# Patient Record
Sex: FEMALE | Race: WHITE | NOT HISPANIC OR LATINO | Employment: OTHER | URBAN - METROPOLITAN AREA
[De-identification: names, ages, dates, MRNs, and addresses within clinical notes are randomized per-mention and may not be internally consistent; named-entity substitution may affect disease eponyms.]

---

## 2019-04-23 ENCOUNTER — TRANSCRIBE ORDERS (OUTPATIENT)
Dept: ADMINISTRATIVE | Facility: HOSPITAL | Age: 69
End: 2019-04-23

## 2019-04-23 DIAGNOSIS — E04.1 THYROID NODULE: ICD-10-CM

## 2019-04-23 DIAGNOSIS — R94.6 NONSPECIFIC ABNORMAL RESULTS OF THYROID FUNCTION STUDY: Primary | ICD-10-CM

## 2019-04-25 ENCOUNTER — HOSPITAL ENCOUNTER (OUTPATIENT)
Dept: ULTRASOUND IMAGING | Facility: HOSPITAL | Age: 69
Discharge: HOME | End: 2019-04-25
Payer: COMMERCIAL

## 2019-04-25 DIAGNOSIS — R94.6 NONSPECIFIC ABNORMAL RESULTS OF THYROID FUNCTION STUDY: ICD-10-CM

## 2019-04-25 DIAGNOSIS — E04.1 THYROID NODULE: ICD-10-CM

## 2019-04-25 PROCEDURE — 76536 US EXAM OF HEAD AND NECK: CPT

## 2019-04-29 ENCOUNTER — TRANSCRIBE ORDERS (OUTPATIENT)
Dept: ADMINISTRATIVE | Facility: HOSPITAL | Age: 69
End: 2019-04-29

## 2019-04-29 DIAGNOSIS — E04.1 THYROID CYST: Primary | ICD-10-CM

## 2019-05-02 ENCOUNTER — OFFICE VISIT (OUTPATIENT)
Dept: FAMILY MEDICINE CLINIC | Facility: CLINIC | Age: 69
End: 2019-05-02
Payer: COMMERCIAL

## 2019-05-02 VITALS
BODY MASS INDEX: 25.3 KG/M2 | DIASTOLIC BLOOD PRESSURE: 70 MMHG | WEIGHT: 142.8 LBS | SYSTOLIC BLOOD PRESSURE: 118 MMHG | HEART RATE: 63 BPM | TEMPERATURE: 98.7 F | RESPIRATION RATE: 18 BRPM | OXYGEN SATURATION: 98 % | HEIGHT: 63 IN

## 2019-05-02 DIAGNOSIS — E04.2 MULTIPLE THYROID NODULES: ICD-10-CM

## 2019-05-02 DIAGNOSIS — F41.9 ANXIETY: Primary | ICD-10-CM

## 2019-05-02 DIAGNOSIS — R19.8 ALTERNATING CONSTIPATION AND DIARRHEA: ICD-10-CM

## 2019-05-02 DIAGNOSIS — R79.89 ABNORMAL TSH: ICD-10-CM

## 2019-05-02 PROCEDURE — 1101F PT FALLS ASSESS-DOCD LE1/YR: CPT | Performed by: FAMILY MEDICINE

## 2019-05-02 PROCEDURE — 99204 OFFICE O/P NEW MOD 45 MIN: CPT | Performed by: FAMILY MEDICINE

## 2019-05-02 RX ORDER — OMEPRAZOLE 20 MG/1
20 CAPSULE, DELAYED RELEASE ORAL DAILY
Refills: 0 | COMMUNITY
Start: 2019-04-16 | End: 2019-05-06

## 2019-05-02 RX ORDER — LORAZEPAM 0.5 MG/1
TABLET ORAL EVERY 8 HOURS PRN
COMMUNITY
End: 2019-06-24 | Stop reason: SDUPTHER

## 2019-05-02 RX ORDER — ASPIRIN 81 MG/1
81 TABLET ORAL DAILY
COMMUNITY
End: 2019-06-24

## 2019-05-02 RX ORDER — ROSUVASTATIN CALCIUM 5 MG/1
5 TABLET, COATED ORAL DAILY
COMMUNITY
Start: 2019-04-23 | End: 2019-10-24

## 2019-05-02 RX ORDER — ESCITALOPRAM OXALATE 5 MG/1
5 TABLET ORAL DAILY
COMMUNITY
Start: 2019-04-30 | End: 2019-05-23

## 2019-05-02 RX ORDER — OMEPRAZOLE 40 MG/1
40 CAPSULE, DELAYED RELEASE ORAL DAILY
Refills: 0 | COMMUNITY
Start: 2019-03-25 | End: 2019-05-02 | Stop reason: ALTCHOICE

## 2019-05-02 RX ORDER — MELATONIN
2000 DAILY
COMMUNITY

## 2019-05-06 ENCOUNTER — TELEPHONE (OUTPATIENT)
Dept: FAMILY MEDICINE CLINIC | Facility: CLINIC | Age: 69
End: 2019-05-06

## 2019-05-06 ENCOUNTER — OFFICE VISIT (OUTPATIENT)
Dept: FAMILY MEDICINE CLINIC | Facility: CLINIC | Age: 69
End: 2019-05-06
Payer: COMMERCIAL

## 2019-05-06 VITALS
OXYGEN SATURATION: 98 % | BODY MASS INDEX: 25.52 KG/M2 | TEMPERATURE: 97.2 F | RESPIRATION RATE: 16 BRPM | SYSTOLIC BLOOD PRESSURE: 138 MMHG | HEIGHT: 63 IN | DIASTOLIC BLOOD PRESSURE: 72 MMHG | WEIGHT: 144 LBS | HEART RATE: 58 BPM

## 2019-05-06 DIAGNOSIS — D69.6 THROMBOCYTOPENIA (HCC): ICD-10-CM

## 2019-05-06 DIAGNOSIS — F41.9 ANXIETY: ICD-10-CM

## 2019-05-06 DIAGNOSIS — E78.2 MIXED HYPERLIPIDEMIA: ICD-10-CM

## 2019-05-06 DIAGNOSIS — E04.2 MULTIPLE THYROID NODULES: ICD-10-CM

## 2019-05-06 DIAGNOSIS — E05.90 SUBCLINICAL HYPERTHYROIDISM: Primary | ICD-10-CM

## 2019-05-06 DIAGNOSIS — R19.8 ALTERNATING CONSTIPATION AND DIARRHEA: ICD-10-CM

## 2019-05-06 PROCEDURE — 99214 OFFICE O/P EST MOD 30 MIN: CPT | Performed by: FAMILY MEDICINE

## 2019-05-16 ENCOUNTER — TELEPHONE (OUTPATIENT)
Dept: FAMILY MEDICINE CLINIC | Facility: CLINIC | Age: 69
End: 2019-05-16

## 2019-05-20 ENCOUNTER — HOSPITAL ENCOUNTER (OUTPATIENT)
Dept: NUCLEAR MEDICINE | Facility: HOSPITAL | Age: 69
Discharge: HOME | End: 2019-05-20
Payer: COMMERCIAL

## 2019-05-20 DIAGNOSIS — E04.1 THYROID CYST: ICD-10-CM

## 2019-05-20 PROCEDURE — 78014 THYROID IMAGING W/BLOOD FLOW: CPT

## 2019-05-20 PROCEDURE — A9516 IODINE I-123 SOD IODIDE MIC: HCPCS

## 2019-05-21 ENCOUNTER — HOSPITAL ENCOUNTER (OUTPATIENT)
Dept: NUCLEAR MEDICINE | Facility: HOSPITAL | Age: 69
Discharge: HOME | End: 2019-05-21
Payer: COMMERCIAL

## 2019-05-23 ENCOUNTER — TELEPHONE (OUTPATIENT)
Dept: FAMILY MEDICINE CLINIC | Facility: CLINIC | Age: 69
End: 2019-05-23

## 2019-05-23 ENCOUNTER — OFFICE VISIT (OUTPATIENT)
Dept: FAMILY MEDICINE CLINIC | Facility: CLINIC | Age: 69
End: 2019-05-23
Payer: COMMERCIAL

## 2019-05-23 VITALS
OXYGEN SATURATION: 99 % | TEMPERATURE: 98 F | HEART RATE: 78 BPM | RESPIRATION RATE: 12 BRPM | HEIGHT: 63 IN | BODY MASS INDEX: 24.45 KG/M2 | SYSTOLIC BLOOD PRESSURE: 126 MMHG | WEIGHT: 138 LBS | DIASTOLIC BLOOD PRESSURE: 68 MMHG

## 2019-05-23 DIAGNOSIS — Z12.31 ENCOUNTER FOR SCREENING MAMMOGRAM FOR BREAST CANCER: ICD-10-CM

## 2019-05-23 DIAGNOSIS — F41.9 ANXIETY: ICD-10-CM

## 2019-05-23 DIAGNOSIS — R63.4 WEIGHT LOSS, NON-INTENTIONAL: ICD-10-CM

## 2019-05-23 DIAGNOSIS — E04.2 MULTIPLE THYROID NODULES: ICD-10-CM

## 2019-05-23 DIAGNOSIS — E05.90 SUBCLINICAL HYPERTHYROIDISM: Primary | ICD-10-CM

## 2019-05-23 DIAGNOSIS — R19.4 CHANGE IN BOWEL HABITS: ICD-10-CM

## 2019-05-23 PROCEDURE — 99215 OFFICE O/P EST HI 40 MIN: CPT | Performed by: FAMILY MEDICINE

## 2019-05-23 RX ORDER — CETIRIZINE HYDROCHLORIDE 10 MG/1
10 TABLET ORAL DAILY
COMMUNITY

## 2019-05-23 RX ORDER — ESCITALOPRAM OXALATE 10 MG/1
10 TABLET ORAL DAILY
Qty: 90 TABLET | Refills: 3 | Status: SHIPPED | OUTPATIENT
Start: 2019-05-23 | End: 2019-05-24

## 2019-05-24 ENCOUNTER — TELEPHONE (OUTPATIENT)
Dept: FAMILY MEDICINE CLINIC | Facility: CLINIC | Age: 69
End: 2019-05-24

## 2019-05-24 DIAGNOSIS — F41.9 ANXIETY: Primary | ICD-10-CM

## 2019-05-24 RX ORDER — ESCITALOPRAM OXALATE 5 MG/1
5 TABLET ORAL DAILY
Qty: 90 TABLET | Refills: 3 | Status: SHIPPED | OUTPATIENT
Start: 2019-05-24 | End: 2019-06-24

## 2019-05-29 ENCOUNTER — TELEPHONE (OUTPATIENT)
Dept: ENDOCRINOLOGY | Facility: HOSPITAL | Age: 69
End: 2019-05-29

## 2019-06-03 ENCOUNTER — APPOINTMENT (OUTPATIENT)
Dept: LAB | Facility: CLINIC | Age: 69
End: 2019-06-03
Payer: COMMERCIAL

## 2019-06-03 ENCOUNTER — OFFICE VISIT (OUTPATIENT)
Dept: ENDOCRINOLOGY | Facility: CLINIC | Age: 69
End: 2019-06-03
Payer: COMMERCIAL

## 2019-06-03 VITALS
SYSTOLIC BLOOD PRESSURE: 128 MMHG | HEART RATE: 66 BPM | HEIGHT: 63 IN | BODY MASS INDEX: 25.14 KG/M2 | WEIGHT: 141.9 LBS | DIASTOLIC BLOOD PRESSURE: 76 MMHG

## 2019-06-03 DIAGNOSIS — E05.90 SUBCLINICAL HYPERTHYROIDISM: ICD-10-CM

## 2019-06-03 DIAGNOSIS — E78.2 MIXED HYPERLIPIDEMIA: ICD-10-CM

## 2019-06-03 DIAGNOSIS — E04.2 MULTIPLE THYROID NODULES: ICD-10-CM

## 2019-06-03 DIAGNOSIS — E04.2 MULTIPLE THYROID NODULES: Primary | ICD-10-CM

## 2019-06-03 LAB
BASOPHILS # BLD AUTO: 0.03 THOUSANDS/ΜL (ref 0–0.1)
BASOPHILS NFR BLD AUTO: 1 % (ref 0–1)
EOSINOPHIL # BLD AUTO: 0.05 THOUSAND/ΜL (ref 0–0.61)
EOSINOPHIL NFR BLD AUTO: 1 % (ref 0–6)
ERYTHROCYTE [DISTWIDTH] IN BLOOD BY AUTOMATED COUNT: 13.8 % (ref 11.6–15.1)
HCT VFR BLD AUTO: 43.4 % (ref 34.8–46.1)
HGB BLD-MCNC: 13.9 G/DL (ref 11.5–15.4)
IMM GRANULOCYTES # BLD AUTO: 0.02 THOUSAND/UL (ref 0–0.2)
IMM GRANULOCYTES NFR BLD AUTO: 0 % (ref 0–2)
LYMPHOCYTES # BLD AUTO: 1.26 THOUSANDS/ΜL (ref 0.6–4.47)
LYMPHOCYTES NFR BLD AUTO: 20 % (ref 14–44)
MCH RBC QN AUTO: 30.8 PG (ref 26.8–34.3)
MCHC RBC AUTO-ENTMCNC: 32 G/DL (ref 31.4–37.4)
MCV RBC AUTO: 96 FL (ref 82–98)
MONOCYTES # BLD AUTO: 0.6 THOUSAND/ΜL (ref 0.17–1.22)
MONOCYTES NFR BLD AUTO: 9 % (ref 4–12)
NEUTROPHILS # BLD AUTO: 4.5 THOUSANDS/ΜL (ref 1.85–7.62)
NEUTS SEG NFR BLD AUTO: 69 % (ref 43–75)
NRBC BLD AUTO-RTO: 0 /100 WBCS
PLATELET # BLD AUTO: 195 THOUSANDS/UL (ref 149–390)
PMV BLD AUTO: 13.3 FL (ref 8.9–12.7)
RBC # BLD AUTO: 4.51 MILLION/UL (ref 3.81–5.12)
T3 SERPL-MCNC: 1.2 NG/ML (ref 0.6–1.8)
T3FREE SERPL-MCNC: 2.67 PG/ML (ref 2.3–4.2)
T4 FREE SERPL-MCNC: 1.11 NG/DL (ref 0.76–1.46)
TSH SERPL DL<=0.05 MIU/L-ACNC: 0.52 UIU/ML (ref 0.36–3.74)
WBC # BLD AUTO: 6.46 THOUSAND/UL (ref 4.31–10.16)

## 2019-06-03 PROCEDURE — 84439 ASSAY OF FREE THYROXINE: CPT

## 2019-06-03 PROCEDURE — 85025 COMPLETE CBC W/AUTO DIFF WBC: CPT

## 2019-06-03 PROCEDURE — 86800 THYROGLOBULIN ANTIBODY: CPT

## 2019-06-03 PROCEDURE — 84445 ASSAY OF TSI GLOBULIN: CPT

## 2019-06-03 PROCEDURE — 36415 COLL VENOUS BLD VENIPUNCTURE: CPT

## 2019-06-03 PROCEDURE — 84443 ASSAY THYROID STIM HORMONE: CPT

## 2019-06-03 PROCEDURE — 86376 MICROSOMAL ANTIBODY EACH: CPT

## 2019-06-03 PROCEDURE — 84480 ASSAY TRIIODOTHYRONINE (T3): CPT

## 2019-06-03 PROCEDURE — 99245 OFF/OP CONSLTJ NEW/EST HI 55: CPT | Performed by: INTERNAL MEDICINE

## 2019-06-03 PROCEDURE — 84481 FREE ASSAY (FT-3): CPT

## 2019-06-04 LAB
THYROGLOB AB SERPL-ACNC: <1 IU/ML (ref 0–0.9)
THYROPEROXIDASE AB SERPL-ACNC: 7 IU/ML (ref 0–34)

## 2019-06-05 PROBLEM — Z12.11 SCREENING FOR MALIGNANT NEOPLASM OF COLON: Status: ACTIVE | Noted: 2019-06-05

## 2019-06-05 LAB
MISCELLANEOUS LAB TEST RESULT: NORMAL
TSI SER-ACNC: <0.1 IU/L (ref 0–0.55)

## 2019-06-06 ENCOUNTER — TELEPHONE (OUTPATIENT)
Dept: ENDOCRINOLOGY | Facility: CLINIC | Age: 69
End: 2019-06-06

## 2019-06-07 ENCOUNTER — TELEPHONE (OUTPATIENT)
Dept: ENDOCRINOLOGY | Facility: CLINIC | Age: 69
End: 2019-06-07

## 2019-06-07 DIAGNOSIS — E05.90 SUBCLINICAL HYPERTHYROIDISM: Primary | ICD-10-CM

## 2019-06-07 DIAGNOSIS — F41.9 ANXIETY: ICD-10-CM

## 2019-06-07 DIAGNOSIS — E04.2 MULTIPLE THYROID NODULES: ICD-10-CM

## 2019-06-10 ENCOUNTER — TELEPHONE (OUTPATIENT)
Dept: FAMILY MEDICINE CLINIC | Facility: CLINIC | Age: 69
End: 2019-06-10

## 2019-06-11 ENCOUNTER — OFFICE VISIT (OUTPATIENT)
Dept: ENDOCRINOLOGY | Facility: CLINIC | Age: 69
End: 2019-06-11
Payer: COMMERCIAL

## 2019-06-11 VITALS
BODY MASS INDEX: 24.98 KG/M2 | HEIGHT: 63 IN | DIASTOLIC BLOOD PRESSURE: 60 MMHG | HEART RATE: 75 BPM | SYSTOLIC BLOOD PRESSURE: 130 MMHG | WEIGHT: 141 LBS

## 2019-06-11 DIAGNOSIS — F41.9 ANXIETY: ICD-10-CM

## 2019-06-11 DIAGNOSIS — E05.90 SUBCLINICAL HYPERTHYROIDISM: Primary | ICD-10-CM

## 2019-06-11 DIAGNOSIS — E04.2 MULTIPLE THYROID NODULES: ICD-10-CM

## 2019-06-11 PROCEDURE — 99215 OFFICE O/P EST HI 40 MIN: CPT | Performed by: INTERNAL MEDICINE

## 2019-06-19 ENCOUNTER — TELEPHONE (OUTPATIENT)
Dept: FAMILY MEDICINE CLINIC | Facility: CLINIC | Age: 69
End: 2019-06-19

## 2019-06-24 ENCOUNTER — OFFICE VISIT (OUTPATIENT)
Dept: FAMILY MEDICINE CLINIC | Facility: CLINIC | Age: 69
End: 2019-06-24
Payer: COMMERCIAL

## 2019-06-24 VITALS
OXYGEN SATURATION: 99 % | DIASTOLIC BLOOD PRESSURE: 60 MMHG | BODY MASS INDEX: 25.37 KG/M2 | RESPIRATION RATE: 12 BRPM | TEMPERATURE: 97.9 F | HEIGHT: 63 IN | SYSTOLIC BLOOD PRESSURE: 126 MMHG | HEART RATE: 68 BPM | WEIGHT: 143.2 LBS

## 2019-06-24 DIAGNOSIS — Z23 NEED FOR VACCINATION: ICD-10-CM

## 2019-06-24 DIAGNOSIS — E78.2 MIXED HYPERLIPIDEMIA: ICD-10-CM

## 2019-06-24 DIAGNOSIS — Z11.59 NEED FOR HEPATITIS C SCREENING TEST: ICD-10-CM

## 2019-06-24 DIAGNOSIS — F41.9 ANXIETY: Primary | ICD-10-CM

## 2019-06-24 DIAGNOSIS — E05.90 SUBCLINICAL HYPERTHYROIDISM: ICD-10-CM

## 2019-06-24 DIAGNOSIS — Z12.31 SCREENING MAMMOGRAM, ENCOUNTER FOR: ICD-10-CM

## 2019-06-24 DIAGNOSIS — D69.6 THROMBOCYTOPENIA (HCC): ICD-10-CM

## 2019-06-24 PROCEDURE — 1036F TOBACCO NON-USER: CPT | Performed by: FAMILY MEDICINE

## 2019-06-24 PROCEDURE — 90471 IMMUNIZATION ADMIN: CPT

## 2019-06-24 PROCEDURE — 90732 PPSV23 VACC 2 YRS+ SUBQ/IM: CPT

## 2019-06-24 PROCEDURE — 99214 OFFICE O/P EST MOD 30 MIN: CPT | Performed by: FAMILY MEDICINE

## 2019-06-24 RX ORDER — LORAZEPAM 0.5 MG/1
0.5 TABLET ORAL EVERY 8 HOURS PRN
Qty: 60 TABLET | Refills: 1 | Status: SHIPPED | OUTPATIENT
Start: 2019-06-24 | End: 2019-10-24

## 2019-06-25 ENCOUNTER — TELEPHONE (OUTPATIENT)
Dept: FAMILY MEDICINE CLINIC | Facility: CLINIC | Age: 69
End: 2019-06-25

## 2019-07-03 ENCOUNTER — TELEPHONE (OUTPATIENT)
Dept: FAMILY MEDICINE CLINIC | Facility: CLINIC | Age: 69
End: 2019-07-03

## 2019-07-03 NOTE — TELEPHONE ENCOUNTER
Patient just wanted to let you know that she will not be getting Metanephrine, Fractionated Plasma Free, Catecholamines, fractionated, plasma  She decided she does not want to have them done

## 2019-07-08 ENCOUNTER — LAB (OUTPATIENT)
Dept: LAB | Facility: CLINIC | Age: 69
End: 2019-07-08
Payer: COMMERCIAL

## 2019-07-08 ENCOUNTER — TRANSCRIBE ORDERS (OUTPATIENT)
Dept: LAB | Facility: CLINIC | Age: 69
End: 2019-07-08

## 2019-07-08 DIAGNOSIS — Z11.59 NEED FOR HEPATITIS C SCREENING TEST: ICD-10-CM

## 2019-07-08 DIAGNOSIS — E04.2 MULTIPLE THYROID NODULES: ICD-10-CM

## 2019-07-08 DIAGNOSIS — E05.90 SUBCLINICAL HYPERTHYROIDISM: ICD-10-CM

## 2019-07-08 LAB
HCV AB SER QL: NORMAL
T3 SERPL-MCNC: 1.2 NG/ML (ref 0.6–1.8)
T4 FREE SERPL-MCNC: 0.77 NG/DL (ref 0.76–1.46)
TSH SERPL DL<=0.05 MIU/L-ACNC: 0.7 UIU/ML (ref 0.36–3.74)

## 2019-07-08 PROCEDURE — 86803 HEPATITIS C AB TEST: CPT

## 2019-07-08 PROCEDURE — 84480 ASSAY TRIIODOTHYRONINE (T3): CPT

## 2019-07-08 PROCEDURE — 36415 COLL VENOUS BLD VENIPUNCTURE: CPT

## 2019-07-08 PROCEDURE — 84439 ASSAY OF FREE THYROXINE: CPT

## 2019-07-08 PROCEDURE — 84443 ASSAY THYROID STIM HORMONE: CPT

## 2019-07-09 ENCOUNTER — TELEPHONE (OUTPATIENT)
Dept: ENDOCRINOLOGY | Facility: CLINIC | Age: 69
End: 2019-07-09

## 2019-07-09 NOTE — TELEPHONE ENCOUNTER
Spoke with patient about her lab results  The Plasma catecholamines, metanephrines were not done because she does not feel the need    She will not be coming back to this office for care, she is moving on to another endocrinologist

## 2019-07-09 NOTE — TELEPHONE ENCOUNTER
----- Message from Thor Sulema, [place] sent at 7/9/2019  9:39 AM EDT -----      ----- Message -----  From: Adry Reis MD  Sent: 7/8/2019   4:20 PM EDT  To: Hong Leone MA    Thyroid function test within normal limits, repeat TSH, free T4 in 2-3 months  Plasma catecholamines, metanephrines Not checked?   Recommend having done - previously ordered

## 2019-07-10 PROBLEM — K64.9 HEMORRHOIDS: Status: ACTIVE | Noted: 2019-07-10

## 2019-07-17 ENCOUNTER — OFFICE VISIT (OUTPATIENT)
Dept: ENDOCRINOLOGY | Facility: HOSPITAL | Age: 69
End: 2019-07-17
Payer: COMMERCIAL

## 2019-07-17 VITALS
HEIGHT: 63 IN | BODY MASS INDEX: 25.94 KG/M2 | HEART RATE: 71 BPM | DIASTOLIC BLOOD PRESSURE: 72 MMHG | SYSTOLIC BLOOD PRESSURE: 120 MMHG | WEIGHT: 146.4 LBS

## 2019-07-17 DIAGNOSIS — E04.2 MULTIPLE THYROID NODULES: ICD-10-CM

## 2019-07-17 DIAGNOSIS — E05.90 SUBCLINICAL HYPERTHYROIDISM: Primary | ICD-10-CM

## 2019-07-17 PROCEDURE — 99214 OFFICE O/P EST MOD 30 MIN: CPT | Performed by: INTERNAL MEDICINE

## 2019-07-17 NOTE — PATIENT INSTRUCTIONS
The thyroid blood work is till normal    We'll follow it over time as you are at risk for it in the future  We'll repeat thyroid blood work in 3 months and then have you follow up in 6 months with thyroid blood work  Thyroid ultrasound will need to be repeated in April 2020

## 2019-07-17 NOTE — PROGRESS NOTES
7/17/2019    Assessment/Plan      Diagnoses and all orders for this visit:    Subclinical hyperthyroidism  -     TSH, 3rd generation Lab Collect; Future  -     T4, free Lab Collect; Future  -     T3, free; Future  -     TSH, 3rd generation Lab Collect; Future  -     T4, free Lab Collect; Future  -     T3, free; Future    Multiple thyroid nodules  -     TSH, 3rd generation Lab Collect; Future  -     T4, free Lab Collect; Future  -     T3, free; Future  -     TSH, 3rd generation Lab Collect; Future  -     T4, free Lab Collect; Future  -     T3, free; Future        Assessment/Plan:    1  Subclinical hyperthyroidism  Most recent thyroid function tests do show euthyroidism with a normal TSH  It is unclear exactly why she had mild subclinical hyperthyroidism in the early winter months  She had a normal thyroid uptake and scan so silent thyroiditis is likely not the issue  I suspect that there was some autonomous production of thyroid hormone from her thyroid nodules which seems to have alleviated at this point  No treatment is needed for now other than observation  I will follow her thyroid function tests closely over time to see if she redevelops subclinical hyperthyroidism  2  Multiple thyroid nodules  Thyroid ultrasound did show small non worrisome thyroid nodules  I would repeat her thyroid ultrasound next year  I have asked her to get a TSH with free T4 and free T3 in 3 months and then follow up in 6 months with preceding TSH, free T4, and free T3       CC:   Hyperthyroid and thyroid nodule follow-up    History of Present Illness     HPI: Jing Mckeon is a 77y o  year old female with history of  Subclinical hyperthyroidism with multiple thyroid nodules  She was seen at the [place] office in early June 2019  She was diagnosed in  February of 2009 with subclinical hyperthyroidism   Blood work normally done every 6 months and complained of anxiety, weight loss, frequent bowel movements, palpitations, lighthededness, tremors  At first thought gastritis  Eventually the blood work was done and noted a thyroid abnormality  She had lost 20 lbs and gained back 14 lbs now  She was going to be started on antithyroid medication, but blood work was improving so thyroid function tests have been followed  During the workup, thyroid nodules were noted  None of them met criteria for biopsy  she denies heat or cold intolerance, palpitation, tremors, anxiety or depression, diarrhea, fatigue, or weight changes  She can be on the constipated side the bowel movements are back to once a day in the morning  She denies insomnia  She has dry skin, brittle nails, and hair loss  She has no diplopia  She has no compressive thyroid symptoms or difficulties with swallowing  She has no history of head or neck irradiation in the past     Review of Systems   Constitutional: Negative for fatigue and unexpected weight change  HENT: Negative for trouble swallowing  No XRt to head or neck in the past    Eyes: Negative for visual disturbance  No diplopia  Wears glasses  Respiratory: Negative for chest tightness and shortness of breath  Cardiovascular: Negative for chest pain and palpitations  Gastrointestinal: Positive for constipation  Negative for abdominal pain, diarrhea and nausea  Can tend to be on the constipated side  Bowel movements back to once daily in am    Endocrine: Negative for cold intolerance and heat intolerance  Skin: Negative for wound  Has dry skin  Has brittle nails  Has hair loss  Neurological: Negative for dizziness, tremors, light-headedness and headaches  Psychiatric/Behavioral: Negative for dysphoric mood and sleep disturbance  The patient is not nervous/anxious          Historical Information   Past Medical History:   Diagnosis Date    Disease of thyroid gland     High cholesterol      Past Surgical History:   Procedure Laterality Date    APPENDECTOMY      HERNIA REPAIR      VAGINAL PROLAPSE REPAIR      Uterine prolase     Social History   Social History     Substance and Sexual Activity   Alcohol Use Not Currently    Frequency: Monthly or less    Drinks per session: 1 or 2    Binge frequency: Never     Social History     Substance and Sexual Activity   Drug Use Never     Social History     Tobacco Use   Smoking Status Never Smoker   Smokeless Tobacco Never Used     Family History:   Family History   Problem Relation Age of Onset    Heart disease Mother     Osteoporosis Mother     Heart attack Father     Lung cancer Father     Heart disease Father     Colon cancer Paternal Aunt     Colon cancer Paternal Uncle     No Known Problems Sister     No Known Problems Brother     No Known Problems Daughter     No Known Problems Sister     No Known Problems Brother     No Known Problems Brother        Meds/Allergies   Current Outpatient Medications   Medication Sig Dispense Refill    cetirizine (ZyrTEC) 10 mg tablet Take 10 mg by mouth daily      cholecalciferol (VITAMIN D3) 1,000 units tablet Take 2,000 Units by mouth daily      LORazepam (ATIVAN) 0 5 mg tablet Take 1 tablet (0 5 mg total) by mouth every 8 (eight) hours as needed for anxiety (Patient taking differently: Take 0 5 mg by mouth every 8 (eight) hours as needed for anxiety Uses primarily at night for sleep) 60 tablet 1    PROCTOSOL HC 2 5 % rectal cream Place 2 5 application on the skin 4 (four) times a day Apply to affectedd area  3    rosuvastatin (CRESTOR) 5 mg tablet Take 5 mg by mouth daily       No current facility-administered medications for this visit  Allergies   Allergen Reactions    Penicillins Rash       Objective   Vitals: Blood pressure 120/72, pulse 71, height 5' 3" (1 6 m), weight 66 4 kg (146 lb 6 4 oz)  Invasive Devices     None                 Physical Exam   Constitutional: She is oriented to person, place, and time  She appears well-developed and well-nourished     HENT: Head: Normocephalic and atraumatic  Eyes: Pupils are equal, round, and reactive to light  Conjunctivae and EOM are normal      No lid lag, stare, proptosis, or periorbital edema  Neck: Normal range of motion  Neck supple  No thyromegaly present  Thyroid irregular in feel without discrete nodules palpable  No bruits over the thyroid gland or carotids  Cardiovascular: Normal rate, regular rhythm and normal heart sounds  No murmur heard  Pulmonary/Chest: Effort normal and breath sounds normal  She has no wheezes  Musculoskeletal: Normal range of motion  She exhibits no edema or deformity  No tremor of the outstretched hands  Lymphadenopathy:     She has no cervical adenopathy  Neurological: She is alert and oriented to person, place, and time  She has normal reflexes  Deep tendon reflexes normal without briskness  Skin: Skin is warm and dry  No rash noted  Vitals reviewed  The history was obtained from the review of the chart and from the patient  Lab Results:      Blood work done on 07/08/2019 showed a TSH of 0 703 with a free T4 of 0 77 and a T3 total of 1 2  Previous blood work on 06/03/2019 showed a TSH of 0 516 with a free T4 of 1 11 and a free T3 of 1 2  Blood work back on 04/02/2019 showed a TSH of 0 25 with a free T4 of 1 08  Thyroid uptake and scan done on 05/21/2019 showed 17% 6 hour uptake and  27 6% at 24 hours  There was heterogeneous distribution in the thyroid gland compatible with multiple nodules  There is no evidence of hot nodules      THYROID ULTRASOUND Done on 04/25/2019     INDICATION:    R94 6: Abnormal results of thyroid function studies  E04 1: Nontoxic single thyroid nodule      COMPARISON:  None     TECHNIQUE:   Ultrasound of the thyroid was performed with a high frequency linear transducer in transverse and sagittal planes including volumetric imaging sweeps as well as traditional still imaging technique      FINDINGS:       RIGHT LOBE: 4 5 x 1 4 x 2 0 cm  Parenchymal echogenicity is heterogeneous with increased vascularity  Numerous subcentimeter nodules  No dominant nodules            LEFT LOBE: 4 9 x 1 0 x 2 0 cm  Parenchymal echogenicity is heterogeneous with increased vascularity  Numerous subcentimeter nodules  Dominant nodules as follows:     Nodule #1  Image 51  Left Upper pole measuring 1 5 x 0 9 x 1 3 cm  COMPOSITION:  2 points, solid or almost completely solid   ECHOGENICITY:  1 point, hyperechoic or isoechoic  SHAPE:  0 points, wider-than-tall  MARGIN: 0 points, smooth  ECHOGENIC FOCI:  0 points, none or large comet-tail artifacts  TI-RADS Classification: TR 3 (3 points), Mildly suspicious  FNA if >2 5 cm  Follow if >1 5 cm  Neither biopsy nor follow-up is required      Nodule #2  Image 62  Left thyroid isthmus measuring 1 3 x 0 9 x 0 6 cm   COMPOSITION:  1 point, mixed cystic and solid  ECHOGENICITY:  1 point, hyperechoic or isoechoic  SHAPE:  0 points, wider-than-tall  MARGIN: 0 points, ill-defined  ECHOGENIC FOCI:  0 points, none or large comet-tail artifacts  TI-RADS Classification: TR 2 (2 points),  Not suspious  No FNA  Neither biopsy nor follow-up is required     ISTHMUS: 0 1 cm  There are no dominant nodules that meet current ACR criteria for biopsy or follow-up      IMPRESSION:  Heterogeneous mildly hypervascular thyroid gland  There are no dominant nodules that meet current ACR criteria for biopsy or follow-up       Future Appointments   Date Time Provider Yusef Brown   10/24/2019 10:00 AM DO [facility]   1/21/2020 10:15 AM LENIN Hadley [facility]

## 2019-10-14 ENCOUNTER — LAB (OUTPATIENT)
Dept: LAB | Facility: CLINIC | Age: 69
End: 2019-10-14
Payer: COMMERCIAL

## 2019-10-14 DIAGNOSIS — E05.90 SUBCLINICAL HYPERTHYROIDISM: ICD-10-CM

## 2019-10-14 DIAGNOSIS — E04.2 MULTIPLE THYROID NODULES: ICD-10-CM

## 2019-10-14 DIAGNOSIS — E78.2 MIXED HYPERLIPIDEMIA: ICD-10-CM

## 2019-10-14 DIAGNOSIS — D69.6 THROMBOCYTOPENIA (HCC): ICD-10-CM

## 2019-10-14 LAB
ALBUMIN SERPL BCP-MCNC: 3.6 G/DL (ref 3.5–5)
ALP SERPL-CCNC: 50 U/L (ref 46–116)
ALT SERPL W P-5'-P-CCNC: 33 U/L (ref 12–78)
ANION GAP SERPL CALCULATED.3IONS-SCNC: 6 MMOL/L (ref 4–13)
AST SERPL W P-5'-P-CCNC: 25 U/L (ref 5–45)
BASOPHILS # BLD AUTO: 0.03 THOUSANDS/ΜL (ref 0–0.1)
BASOPHILS NFR BLD AUTO: 1 % (ref 0–1)
BILIRUB SERPL-MCNC: 0.7 MG/DL (ref 0.2–1)
BUN SERPL-MCNC: 16 MG/DL (ref 5–25)
CALCIUM SERPL-MCNC: 8.8 MG/DL (ref 8.3–10.1)
CHLORIDE SERPL-SCNC: 107 MMOL/L (ref 100–108)
CHOLEST SERPL-MCNC: 180 MG/DL (ref 50–200)
CO2 SERPL-SCNC: 30 MMOL/L (ref 21–32)
CREAT SERPL-MCNC: 0.73 MG/DL (ref 0.6–1.3)
EOSINOPHIL # BLD AUTO: 0.06 THOUSAND/ΜL (ref 0–0.61)
EOSINOPHIL NFR BLD AUTO: 1 % (ref 0–6)
ERYTHROCYTE [DISTWIDTH] IN BLOOD BY AUTOMATED COUNT: 13.4 % (ref 11.6–15.1)
GFR SERPL CREATININE-BSD FRML MDRD: 85 ML/MIN/1.73SQ M
GLUCOSE P FAST SERPL-MCNC: 89 MG/DL (ref 65–99)
HCT VFR BLD AUTO: 42.8 % (ref 34.8–46.1)
HDLC SERPL-MCNC: 67 MG/DL (ref 40–60)
HGB BLD-MCNC: 13.5 G/DL (ref 11.5–15.4)
IMM GRANULOCYTES # BLD AUTO: 0.01 THOUSAND/UL (ref 0–0.2)
IMM GRANULOCYTES NFR BLD AUTO: 0 % (ref 0–2)
LDLC SERPL CALC-MCNC: 101 MG/DL (ref 0–100)
LYMPHOCYTES # BLD AUTO: 1.73 THOUSANDS/ΜL (ref 0.6–4.47)
LYMPHOCYTES NFR BLD AUTO: 38 % (ref 14–44)
MCH RBC QN AUTO: 30.4 PG (ref 26.8–34.3)
MCHC RBC AUTO-ENTMCNC: 31.5 G/DL (ref 31.4–37.4)
MCV RBC AUTO: 96 FL (ref 82–98)
MONOCYTES # BLD AUTO: 0.47 THOUSAND/ΜL (ref 0.17–1.22)
MONOCYTES NFR BLD AUTO: 10 % (ref 4–12)
NEUTROPHILS # BLD AUTO: 2.28 THOUSANDS/ΜL (ref 1.85–7.62)
NEUTS SEG NFR BLD AUTO: 50 % (ref 43–75)
NONHDLC SERPL-MCNC: 113 MG/DL
NRBC BLD AUTO-RTO: 0 /100 WBCS
PLATELET # BLD AUTO: 179 THOUSANDS/UL (ref 149–390)
PMV BLD AUTO: 12.2 FL (ref 8.9–12.7)
POTASSIUM SERPL-SCNC: 3.7 MMOL/L (ref 3.5–5.3)
PROT SERPL-MCNC: 7 G/DL (ref 6.4–8.2)
RBC # BLD AUTO: 4.44 MILLION/UL (ref 3.81–5.12)
SODIUM SERPL-SCNC: 143 MMOL/L (ref 136–145)
T3FREE SERPL-MCNC: 2.8 PG/ML (ref 2.3–4.2)
T4 FREE SERPL-MCNC: 0.88 NG/DL (ref 0.76–1.46)
TRIGL SERPL-MCNC: 61 MG/DL
TSH SERPL DL<=0.05 MIU/L-ACNC: 0.59 UIU/ML (ref 0.36–3.74)
WBC # BLD AUTO: 4.58 THOUSAND/UL (ref 4.31–10.16)

## 2019-10-14 PROCEDURE — 80053 COMPREHEN METABOLIC PANEL: CPT

## 2019-10-14 PROCEDURE — 85025 COMPLETE CBC W/AUTO DIFF WBC: CPT

## 2019-10-14 PROCEDURE — 36415 COLL VENOUS BLD VENIPUNCTURE: CPT

## 2019-10-14 PROCEDURE — 84443 ASSAY THYROID STIM HORMONE: CPT

## 2019-10-14 PROCEDURE — 84481 FREE ASSAY (FT-3): CPT

## 2019-10-14 PROCEDURE — 84439 ASSAY OF FREE THYROXINE: CPT

## 2019-10-14 PROCEDURE — 80061 LIPID PANEL: CPT

## 2019-10-24 ENCOUNTER — OFFICE VISIT (OUTPATIENT)
Dept: FAMILY MEDICINE CLINIC | Facility: CLINIC | Age: 69
End: 2019-10-24
Payer: COMMERCIAL

## 2019-10-24 VITALS
TEMPERATURE: 97.6 F | HEART RATE: 60 BPM | HEIGHT: 63 IN | SYSTOLIC BLOOD PRESSURE: 130 MMHG | WEIGHT: 154 LBS | RESPIRATION RATE: 16 BRPM | OXYGEN SATURATION: 98 % | BODY MASS INDEX: 27.29 KG/M2 | DIASTOLIC BLOOD PRESSURE: 80 MMHG

## 2019-10-24 DIAGNOSIS — R19.8 ALTERNATING CONSTIPATION AND DIARRHEA: ICD-10-CM

## 2019-10-24 DIAGNOSIS — Z00.00 WELL ADULT EXAM: Primary | ICD-10-CM

## 2019-10-24 DIAGNOSIS — F41.9 ANXIETY: ICD-10-CM

## 2019-10-24 DIAGNOSIS — E05.90 SUBCLINICAL HYPERTHYROIDISM: ICD-10-CM

## 2019-10-24 DIAGNOSIS — E78.2 MIXED HYPERLIPIDEMIA: ICD-10-CM

## 2019-10-24 DIAGNOSIS — K64.0 GRADE I HEMORRHOIDS: ICD-10-CM

## 2019-10-24 DIAGNOSIS — Z23 NEED FOR VACCINATION: ICD-10-CM

## 2019-10-24 PROCEDURE — 90715 TDAP VACCINE 7 YRS/> IM: CPT | Performed by: FAMILY MEDICINE

## 2019-10-24 PROCEDURE — 99397 PER PM REEVAL EST PAT 65+ YR: CPT | Performed by: FAMILY MEDICINE

## 2019-10-24 PROCEDURE — 90471 IMMUNIZATION ADMIN: CPT | Performed by: FAMILY MEDICINE

## 2019-10-24 RX ORDER — LORAZEPAM 0.5 MG/1
0.5 TABLET ORAL
Qty: 30 TABLET | Refills: 2 | Status: SHIPPED | OUTPATIENT
Start: 2019-10-24 | End: 2020-01-09 | Stop reason: SDUPTHER

## 2019-10-24 RX ORDER — ROSUVASTATIN CALCIUM 5 MG/1
5 TABLET, COATED ORAL DAILY
Qty: 90 TABLET | Refills: 3 | Status: SHIPPED | OUTPATIENT
Start: 2019-10-24 | End: 2020-09-10 | Stop reason: SDUPTHER

## 2019-10-24 NOTE — PROGRESS NOTES
Assessment/Plan:   Problems/concerns addressed in this visit are addressed in the attached note  Well adult exam  ·         Continue healthy diet   ·         Encourage exercise 4 times a week or more for minimum 30 minutes  ·         Continue to see dentist, wear seatbelt  ·         Health maintenance reviewed- prescription for mammo given today  Reviewed age appropriate health maintenance screenings and immunizations that are due, risks and benefits of these  She agrees to update Tdap today  Reviewed labs   Refills given today   Follow up one year   Health Maintenance   Topic Date Due    MAMMOGRAM  1950    Fall Risk  05/02/2020    Depression Screening PHQ  05/02/2020    Urinary Incontinence Screening  05/02/2020    BMI: Followup Plan  06/24/2020    BMI: Adult  10/24/2020    CRC Screening: Colonoscopy  06/18/2029    DTaP,Tdap,and Td Vaccines (2 - Td) 10/24/2029    Hepatitis C Screening  Completed    INFLUENZA VACCINE  Completed    Pneumococcal Vaccine: 65+ Years  Completed    Pneumococcal Vaccine: Pediatrics (0 to 5 Years) and At-Risk Patients (6 to 59 Years)  Aged Out    HEPATITIS B VACCINES  Aged Out     Return in about 1 year (around 10/24/2020) for Annual physical   There are no Patient Instructions on file for this visit  Subjective:    ARNOLD Bo is a 77 y o  female who presents today for a physical      Chief Complaint   Patient presents with    Physical Exam     ---Above per clinical staff & reviewed  ---    Diet: tries for healthy -   B: yogurt, fruit  L: hummus crackers  Dinner: salad yesgrilled chicken   Drinks water, one glass wine   Exercise:  Walking weather permitting, very active   Dental visits:  yes  Seatbelt: yes    Concerns today: none  Review labs  Doing well on meds  Needs refills  Using ativan to sleep every night  This is working well  She is eating normally             The following portions of the patient's history were reviewed and updated as appropriate: allergies, current medications, past family history, past medical history, past social history, past surgical history and problem list      Current Outpatient Medications   Medication Sig Dispense Refill    cetirizine (ZyrTEC) 10 mg tablet Take 10 mg by mouth daily      cholecalciferol (VITAMIN D3) 1,000 units tablet Take 2,000 Units by mouth daily      LORazepam (ATIVAN) 0 5 mg tablet Take 1 tablet (0 5 mg total) by mouth daily at bedtime 30 tablet 2    PROCTOSOL HC 2 5 % rectal cream Insert 3 application into the rectum daily Apply to affected area 30 g 3    rosuvastatin (CRESTOR) 5 mg tablet Take 1 tablet (5 mg total) by mouth daily 90 tablet 3     No current facility-administered medications for this visit  Objective:      /80   Pulse 60   Temp 97 6 °F (36 4 °C)   Resp 16   Ht 5' 3" (1 6 m)   Wt 69 9 kg (154 lb)   SpO2 98%   BMI 27 28 kg/m²     BP Readings from Last 3 Encounters:   10/24/19 130/80   07/17/19 120/72   07/10/19 132/77     Wt Readings from Last 3 Encounters:   10/24/19 69 9 kg (154 lb)   07/17/19 66 4 kg (146 lb 6 4 oz)   07/10/19 66 2 kg (146 lb)       Review of Systems  all others negative - no chest pain, SOB, normal urine and bowels  no GERD  sleeping well with one ativan before bed  mood good  Physical Exam   Constitutional: Appears well-developed and well-nourished  HENT: Head: Normocephalic  Sclera clear  Right Ear: External ear normal  Tympanic membrane normal    Left Ear: External ear normal  Tympanic membrane normal    Nose: Nose normal  No mucosal edema, No rhinorrhea  Mouth/Throat: Oropharynx is clear and moist   Eyes: Normal conjunctiva  No erythema  No discharge  Neck: Neck supple  No thyromegaly  Cardiovascular: Normal rate, regular rhythm and normal heart sounds  No murmur  Pulmonary/Chest: Effort normal and breath sounds normal  No wheezes, rales, or rhonchi  Abdominal: Soft  Bowel sounds are normal  There is no tenderness   No hepatosplenomegaly  Musculoskeletal: No lower extremity edema  Lymphadenopathy: No cervical adenopathy  Neurological: Alert and oriented to person, place, and time  Skin: Skin is warm and dry  Psychiatric: Normal mood and affect   Behavior is normal  Thought content normal

## 2019-12-06 ENCOUNTER — OFFICE VISIT (OUTPATIENT)
Dept: FAMILY MEDICINE CLINIC | Facility: CLINIC | Age: 69
End: 2019-12-06

## 2019-12-06 VITALS
WEIGHT: 156.2 LBS | RESPIRATION RATE: 16 BRPM | BODY MASS INDEX: 27.68 KG/M2 | OXYGEN SATURATION: 97 % | TEMPERATURE: 97.8 F | HEART RATE: 73 BPM | SYSTOLIC BLOOD PRESSURE: 102 MMHG | HEIGHT: 63 IN | DIASTOLIC BLOOD PRESSURE: 64 MMHG

## 2019-12-06 DIAGNOSIS — J06.9 VIRAL UPPER RESPIRATORY TRACT INFECTION: Primary | ICD-10-CM

## 2019-12-06 PROCEDURE — 1160F RVW MEDS BY RX/DR IN RCRD: CPT | Performed by: FAMILY MEDICINE

## 2019-12-06 PROCEDURE — 3008F BODY MASS INDEX DOCD: CPT | Performed by: FAMILY MEDICINE

## 2019-12-06 PROCEDURE — 99213 OFFICE O/P EST LOW 20 MIN: CPT | Performed by: FAMILY MEDICINE

## 2019-12-06 PROCEDURE — 1036F TOBACCO NON-USER: CPT | Performed by: FAMILY MEDICINE

## 2019-12-06 RX ORDER — GUAIFENESIN 600 MG
1200 TABLET, EXTENDED RELEASE 12 HR ORAL EVERY 12 HOURS SCHEDULED
COMMUNITY
End: 2020-11-12 | Stop reason: ALTCHOICE

## 2019-12-06 RX ORDER — IBUPROFEN 200 MG
200 TABLET ORAL EVERY 6 HOURS PRN
COMMUNITY

## 2019-12-06 RX ORDER — FLUTICASONE PROPIONATE 50 MCG
2 SPRAY, SUSPENSION (ML) NASAL DAILY PRN
Qty: 1 BOTTLE | Refills: 10 | Status: SHIPPED | OUTPATIENT
Start: 2019-12-06 | End: 2020-03-31 | Stop reason: ALTCHOICE

## 2019-12-06 NOTE — PATIENT INSTRUCTIONS
You may use Tylenol (Acetaminophen) up to 3,000mg daily (in 24 hours) as needed for pain or fever  You may use Motrin (Ibuprofen) up to 800mg 3 times daily with food (in 24 hours) as needed for pain or fever  Advise Aparna melo sinus rinse kit, Mucinex, Claritin/Zyrtec/Allegra  Avoid decongestants if you have high blood pressure  Cold Symptoms   AMBULATORY CARE:   Cold symptoms  include sneezing, dry throat, a stuffy nose, headache, watery eyes, and a cough  Your cough may be dry, or you may cough up mucus  You may also have muscle aches, joint pain, and tiredness  Rarely, you may have a fever  Cold symptoms occur from inflammation in your upper respiratory system caused by a virus  Most colds go away without treatment  Seek care immediately if:   · You have increased tiredness and weakness  · You are unable to eat  · Your heart is beating much faster than usual for you  · You see white spots in the back of your throat and your neck is swollen and sore to the touch  · You see pinpoint or larger reddish-purple dots on your skin  Contact your healthcare provider if:   · You have a fever higher than 102°F (38 9°C)  · You have new or worsening shortness of breath  · You have thick nasal drainage for more than 2 days  · Your symptoms do not improve or get worse within 5 days  · You have questions or concerns about your condition or care  Treatment for cold symptoms  may include NSAIDS to decrease muscle aches and fever  Cold medicines may also be given to decrease coughing, nasal stuffiness, sneezing, and a runny nose  Manage your cold symptoms: The following may help relieve cold symptoms, such as a dry throat and congestion:  · Gargle with mouthwash or warm salt water as directed  · Suck on throat lozenges or hard candy  · Use a cold or warm vaporizer or humidifier to ease your breathing       · Rest for at least 2 days and then as needed to decrease tiredness and weakness  · Use petroleum based jelly around your nostrils to decrease irritation from blowing your nose  · Drink plenty of liquids  Liquids will help thin and loosen thick mucus so you can cough it up  Liquids will also keep you hydrated  Ask your healthcare provider which liquids are best for you and how much to drink each day  Prevent the spread of germs  by washing your hands often  You can spread your cold germs to others for at least 3 days after your symptoms start  Do not share items, such as eating utensils  Cover your nose and mouth when you cough or sneeze using the crook of your elbow instead of your hands  Throw used tissues in the garbage  Do not smoke:  Smoking may worsen your symptoms and increase the length of time you feel sick  Talk with your healthcare provider if you need help to stop smoking  Follow up with your healthcare provider as directed:  Write down your questions so you remember to ask them during your visits  © 2017 [address] [employer] [facility] [employer] Information is for End User's use only and may not be sold, redistributed or otherwise used for commercial purposes  All illustrations and images included in CareNotes® are the copyrighted property of [employer] , [employer]  or Andrew Burnette  The above information is an  only  It is not intended as medical advice for individual conditions or treatments  Talk to your doctor, nurse or pharmacist before following any medical regimen to see if it is safe and effective for you

## 2019-12-06 NOTE — PROGRESS NOTES
Assessment/Plan:  Problem List Items Addressed This Visit     None      Visit Diagnoses     Viral upper respiratory tract infection    -  Primary    Relevant Medications    fluticasone (FLONASE) 50 mcg/act nasal spray    Advise supportive care  Advise Shekhar Seen med sinus rinse kit, Mucinex, Claritin/Zyrtec/Allegra  Avoid decongestants if you have high blood pressure  Return if symptoms worsen or fail to improve  Future Appointments   Date Time Provider Yusef Brown   1/21/2020 10:15 AM LENIN Singh [facility]   10/26/2020 10:00 AM Terrence Mckeon DO [facility]        Subjective:     Vermillion is a 77 y o  female who presents today for a follow-up on her acute medical conditions  HPI:  Chief Complaint   Patient presents with    Nasal Congestion     Started Wednesday morning  has clear mucus     Generalized Body Aches     body feels achey  since yesterday  -- Above per clinical staff and reviewed  --    HPI      Today:      Controlled Substance Review    [place] PDMP or [place]  reviewed: A discrepancy was discovered  No prescription issued due to: On chronic Ativan  Last refill 11/24/19  Delmi Llamas URI - Symptoms x 2 days  +Nasal congestion  Generalized mild myalgias x 1 day  Frontal HA, fatigue, rhinorrhea, R sore throat, PND  Using Advil, Mucinex, Zyrtec - unsure if helpful  +Fluids  No sick contacts  Had flu vaccine  The following portions of the patient's history were reviewed and updated as appropriate: allergies, current medications, past family history, past medical history, past social history, past surgical history and problem list       Review of Systems   Constitutional: Positive for appetite change (Decreased), chills and fatigue  Negative for diaphoresis and fever  HENT: Positive for congestion (Nasal), postnasal drip and rhinorrhea  Respiratory: Positive for cough  Negative for chest tightness, shortness of breath and wheezing  Cardiovascular: Negative for chest pain  Gastrointestinal: Negative for abdominal pain, diarrhea, nausea and vomiting  Genitourinary: Negative for dysuria  Neurological: Positive for headaches  Current Outpatient Medications   Medication Sig Dispense Refill    cetirizine (ZyrTEC) 10 mg tablet Take 10 mg by mouth daily      cholecalciferol (VITAMIN D3) 1,000 units tablet Take 2,000 Units by mouth daily      guaiFENesin (MUCINEX) 600 mg 12 hr tablet Take 1,200 mg by mouth every 12 (twelve) hours      ibuprofen (MOTRIN) 200 mg tablet Take 200 mg by mouth every 6 (six) hours as needed for mild pain      LORazepam (ATIVAN) 0 5 mg tablet Take 1 tablet (0 5 mg total) by mouth daily at bedtime 30 tablet 2    PROCTOSOL HC 2 5 % rectal cream Insert 3 application into the rectum daily Apply to affected area 30 g 3    rosuvastatin (CRESTOR) 5 mg tablet Take 1 tablet (5 mg total) by mouth daily 90 tablet 3    fluticasone (FLONASE) 50 mcg/act nasal spray 2 sprays into each nostril daily as needed for rhinitis 1 Bottle 10     No current facility-administered medications for this visit  Objective:  /64   Pulse 73   Temp 97 8 °F (36 6 °C) (Tympanic)   Resp 16   Ht 5' 3" (1 6 m)   Wt 70 9 kg (156 lb 3 2 oz)   SpO2 97%   BMI 27 67 kg/m²    Wt Readings from Last 3 Encounters:   12/06/19 70 9 kg (156 lb 3 2 oz)   10/24/19 69 9 kg (154 lb)   07/17/19 66 4 kg (146 lb 6 4 oz)      BP Readings from Last 3 Encounters:   12/06/19 102/64   10/24/19 130/80   07/17/19 120/72          Physical Exam   Constitutional: She is oriented to person, place, and time  She appears well-developed and well-nourished  HENT:   Head: Normocephalic and atraumatic  Right Ear: Tympanic membrane, external ear and ear canal normal    Left Ear: Tympanic membrane, external ear and ear canal normal    Nose: Nose normal  Right sinus exhibits no maxillary sinus tenderness and no frontal sinus tenderness   Left sinus exhibits no maxillary sinus tenderness and no frontal sinus tenderness  Mouth/Throat: Uvula is midline, oropharynx is clear and moist and mucous membranes are normal  No oropharyngeal exudate  No tonsillar exudate  +Cobblestoning  Eyes: Conjunctivae and EOM are normal    Neck: Neck supple  Cardiovascular: Normal rate, regular rhythm, normal heart sounds and intact distal pulses  Pulmonary/Chest: Effort normal and breath sounds normal    Musculoskeletal: She exhibits no edema or tenderness  Lymphadenopathy:     She has no cervical adenopathy  Neurological: She is alert and oriented to person, place, and time  Skin: No rash noted  Psychiatric: She has a normal mood and affect  Nursing note and vitals reviewed  Lab Results:      Lab Results   Component Value Date    WBC 4 58 10/14/2019    HGB 13 5 10/14/2019    HCT 42 8 10/14/2019     10/14/2019    TRIG 61 10/14/2019    HDL 67 (H) 10/14/2019    ALT 33 10/14/2019    AST 25 10/14/2019    K 3 7 10/14/2019     10/14/2019    CREATININE 0 73 10/14/2019    BUN 16 10/14/2019    CO2 30 10/14/2019    GLUF 89 10/14/2019     No results found for: URICACID  Invalid input(s): BASENAME Vitamin D    No results found       POCT Labs

## 2020-01-02 ENCOUNTER — APPOINTMENT (OUTPATIENT)
Dept: LAB | Facility: CLINIC | Age: 70
End: 2020-01-02
Payer: COMMERCIAL

## 2020-01-02 LAB
T3FREE SERPL-MCNC: 2.55 PG/ML (ref 2.3–4.2)
T4 FREE SERPL-MCNC: 0.79 NG/DL (ref 0.76–1.46)
TSH SERPL DL<=0.05 MIU/L-ACNC: 0.85 UIU/ML (ref 0.36–3.74)

## 2020-01-02 PROCEDURE — 84481 FREE ASSAY (FT-3): CPT

## 2020-01-02 PROCEDURE — 84439 ASSAY OF FREE THYROXINE: CPT

## 2020-01-02 PROCEDURE — 36415 COLL VENOUS BLD VENIPUNCTURE: CPT

## 2020-01-02 PROCEDURE — 84443 ASSAY THYROID STIM HORMONE: CPT

## 2020-01-09 DIAGNOSIS — F41.9 ANXIETY: ICD-10-CM

## 2020-01-09 DIAGNOSIS — K64.0 GRADE I HEMORRHOIDS: ICD-10-CM

## 2020-01-09 RX ORDER — LORAZEPAM 0.5 MG/1
0.5 TABLET ORAL
Qty: 30 TABLET | Refills: 2 | Status: SHIPPED | OUTPATIENT
Start: 2020-01-09 | End: 2020-03-31 | Stop reason: SDUPTHER

## 2020-01-09 NOTE — TELEPHONE ENCOUNTER
Medication refill request:   Ativan   Proctosol cream  Last office visit: 12/6/19, sick   Next office visit: 10/26/2020  Last refilled:   Ativan 10/24/19 #30x2  Proctosol cream 10/24/19 #30gx3    Pharmacy:   [facility] [id number] [id number] [address] [place] [id number]  Phone: [phone] Fax: [phone]

## 2020-01-09 NOTE — TELEPHONE ENCOUNTER
I will send in refills   Please ask her to schedule an appointment in April due to the lorazepam - I have to see her every 6 months for this

## 2020-01-21 ENCOUNTER — OFFICE VISIT (OUTPATIENT)
Dept: ENDOCRINOLOGY | Facility: HOSPITAL | Age: 70
End: 2020-01-21
Payer: COMMERCIAL

## 2020-01-21 VITALS
HEART RATE: 58 BPM | WEIGHT: 157.8 LBS | DIASTOLIC BLOOD PRESSURE: 70 MMHG | HEIGHT: 63 IN | SYSTOLIC BLOOD PRESSURE: 110 MMHG | BODY MASS INDEX: 27.96 KG/M2

## 2020-01-21 DIAGNOSIS — E05.90 SUBCLINICAL HYPERTHYROIDISM: Primary | ICD-10-CM

## 2020-01-21 DIAGNOSIS — E04.2 MULTIPLE THYROID NODULES: ICD-10-CM

## 2020-01-21 DIAGNOSIS — E78.2 MIXED HYPERLIPIDEMIA: ICD-10-CM

## 2020-01-21 DIAGNOSIS — E55.9 VITAMIN D DEFICIENCY: ICD-10-CM

## 2020-01-21 PROCEDURE — 99214 OFFICE O/P EST MOD 30 MIN: CPT | Performed by: NURSE PRACTITIONER

## 2020-01-21 PROCEDURE — 1160F RVW MEDS BY RX/DR IN RCRD: CPT | Performed by: NURSE PRACTITIONER

## 2020-01-21 RX ORDER — ASPIRIN 81 MG/1
81 TABLET, CHEWABLE ORAL DAILY
COMMUNITY

## 2020-01-21 NOTE — PROGRESS NOTES
Guillermina Eubanks 77 y o  female MRN: [id number]    Encounter: [id number]      Assessment/Plan     Assessment: This is a 77y o -year-old female with subclinical hyperthyroidism, multiple thyroid nodules and hyperlipidemia  Plan:  1  Subclinical hyperthyroidism  Generally, she feels well  Her most recent TSH, T3 and T4 levels are normal   Will continue surveillance with TSH, free T3 and free T4 prior to next office visit  She will contact the office with any change in symptoms  2  Multiple thyroid nodules  Thyroid ultrasound did show small non worrisome thyroid nodules  repeat thyroid ultrasound to maintain surveillance in April/May 2020  We will contact her with results  3   Hyperlipidemia:  Continue Crestor  Managed by PCP  4   Vitamin-D deficiency:  Continue supplementation  CC:  Hyperthyroid/thyroid nodule follow-up    History of Present Illness     HPI:  77y o  year old female with history of subclinical hyperthyroidism with multiple thyroid nodules  She was seen at the [facility] office in early June 2019  She was diagnosed in  February of 2009 with subclinical hyperthyroidism  Blood work normally done every 6 months and complained of anxiety, weight loss, frequent bowel movements, palpitations, lighthededness, tremors  At first thought gastritis  Eventually the blood work was done and noted a thyroid abnormality  She was going to be started on antithyroid medication, but blood work was improving so thyroid function tests have been followed  Her most recent TSH from January 2, 2020 is 0 847 with a free T3 of 2 55 and free T4 of 0 79  During the workup, thyroid nodules were noted  None of them met criteria for biopsy  She denies heat or cold intolerance, palpitation, tremors, anxiety or depression, diarrhea, fatigue, or weight changes  She denies insomnia  She has dry skin, brittle nails, and hair loss  She has no diplopia   She denies any anterior neck discomfort, dysphagia or dysphonia  She has no history of head or neck irradiation in the past     For her hyperlipidemia, she is treated with Crestor 5 mg daily  For her vitamin-D deficiency, she supplements with 2000 units of vitamin D3 daily  Review of Systems   Constitutional: Negative  Negative for chills and fever  HENT: Negative  Eyes: Negative  Respiratory: Negative  Negative for chest tightness and shortness of breath  Cardiovascular: Negative  Negative for chest pain  Gastrointestinal: Negative  Negative for abdominal pain, constipation, diarrhea and vomiting  Genitourinary: Negative  Musculoskeletal: Negative  Skin: Negative  Allergic/Immunologic: Negative  Neurological: Negative  Negative for dizziness, syncope, light-headedness and headaches  Hematological: Negative  Psychiatric/Behavioral: Negative  All other systems reviewed and are negative        Historical Information   Past Medical History:   Diagnosis Date    Disease of thyroid gland     High cholesterol      Past Surgical History:   Procedure Laterality Date    APPENDECTOMY      HERNIA REPAIR      VAGINAL PROLAPSE REPAIR      Uterine prolase     Social History   Social History     Substance and Sexual Activity   Alcohol Use Not Currently    Frequency: Monthly or less    Drinks per session: 1 or 2    Binge frequency: Never     Social History     Substance and Sexual Activity   Drug Use Never     Social History     Tobacco Use   Smoking Status Never Smoker   Smokeless Tobacco Never Used     Family History:   Family History   Problem Relation Age of Onset    Heart disease Mother     Osteoporosis Mother     Heart attack Father     Lung cancer Father     Heart disease Father     Colon cancer Paternal Aunt     Colon cancer Paternal Uncle     No Known Problems Sister     No Known Problems Brother     No Known Problems Daughter     No Known Problems Sister     No Known Problems Brother     No Known Problems Brother        Meds/Allergies   Current Outpatient Medications   Medication Sig Dispense Refill    aspirin 81 mg chewable tablet Chew 81 mg daily      cetirizine (ZyrTEC) 10 mg tablet Take 10 mg by mouth daily      cholecalciferol (VITAMIN D3) 1,000 units tablet Take 2,000 Units by mouth daily      ibuprofen (MOTRIN) 200 mg tablet Take 200 mg by mouth every 6 (six) hours as needed for mild pain      LORazepam (ATIVAN) 0 5 mg tablet Take 1 tablet (0 5 mg total) by mouth daily at bedtime 30 tablet 2    PROCTOSOL HC 2 5 % rectal cream Insert 3 application into the rectum daily Apply to affected area 30 g 5    rosuvastatin (CRESTOR) 5 mg tablet Take 1 tablet (5 mg total) by mouth daily 90 tablet 3    fluticasone (FLONASE) 50 mcg/act nasal spray 2 sprays into each nostril daily as needed for rhinitis (Patient not taking: Reported on 1/21/2020) 1 Bottle 10    guaiFENesin (MUCINEX) 600 mg 12 hr tablet Take 1,200 mg by mouth every 12 (twelve) hours       No current facility-administered medications for this visit  Allergies   Allergen Reactions    Penicillins Rash       Objective   Vitals: Blood pressure 110/70, pulse 58, height 5' 3" (1 6 m), weight 71 6 kg (157 lb 12 8 oz)  Physical Exam   Constitutional: She is oriented to person, place, and time  She appears well-developed and well-nourished  HENT:   Head: Normocephalic and atraumatic  Mouth/Throat: Oropharynx is clear and moist    Eyes: Pupils are equal, round, and reactive to light  Conjunctivae and EOM are normal    Neck: Normal range of motion  Neck supple  Cardiovascular: Normal rate, regular rhythm, normal heart sounds and intact distal pulses  Pulmonary/Chest: Effort normal and breath sounds normal    Abdominal: Soft  Bowel sounds are normal    Musculoskeletal: Normal range of motion  Neurological: She is alert and oriented to person, place, and time  Skin: Skin is warm and dry  Psychiatric: She has a normal mood and affect   Her behavior is normal  Judgment and thought content normal    Vitals reviewed  Lab Results:   Lab Results   Component Value Date/Time    TSH 3RD GENERATON 0 847 01/02/2020 08:50 AM    TSH 3RD GENERATON 0 588 10/14/2019 08:54 AM    TSH 3RD GENERATON 0 703 07/08/2019 07:37 AM    Free T4 0 79 01/02/2020 08:50 AM    Free T4 0 88 10/14/2019 08:54 AM    Free T4 0 77 07/08/2019 07:37 AM       Imaging Studies:   Results for orders placed during the hospital encounter of 04/25/19   US thyroid    Impression Heterogeneous mildly hypervascular thyroid gland  There are no dominant nodules that meet current ACR criteria for biopsy or follow-up  Reference: ACR Thyroid Imaging, Reporting and Data System (TI-RADS): White Paper of the [employer]  J AM [place] Radiol 2995;92:732-169  (additional recommendations based on American Thyroid Association 2015 guidelines )      Workstation performed: [id number]       Portions of the record may have been created with voice recognition software  Occasional wrong word or "sound a like" substitutions may have occurred due to the inherent limitations of voice recognition software  Read the chart carefully and recognize, using context, where substitutions have occurred

## 2020-01-21 NOTE — PATIENT INSTRUCTIONS
Obtain thyroid function lab work prior to next visit  Obtain thyroid ultrasound sometime after April 25, 2020  We will call you with results and any applicable further testing  Continue to supplement with vitamin D3 daily  Continue Crestor

## 2020-03-30 DIAGNOSIS — F41.9 ANXIETY: ICD-10-CM

## 2020-03-30 RX ORDER — LORAZEPAM 0.5 MG/1
0.5 TABLET ORAL
Qty: 30 TABLET | Refills: 1 | Status: CANCELLED | OUTPATIENT
Start: 2020-03-30 | End: 2020-06-28

## 2020-03-31 ENCOUNTER — TELEMEDICINE (OUTPATIENT)
Dept: FAMILY MEDICINE CLINIC | Facility: CLINIC | Age: 70
End: 2020-03-31
Payer: COMMERCIAL

## 2020-03-31 VITALS — HEIGHT: 63 IN | WEIGHT: 153.4 LBS | BODY MASS INDEX: 27.18 KG/M2 | HEART RATE: 60 BPM

## 2020-03-31 DIAGNOSIS — G47.00 PERSISTENT INSOMNIA: ICD-10-CM

## 2020-03-31 DIAGNOSIS — F41.9 ANXIETY: Primary | ICD-10-CM

## 2020-03-31 PROBLEM — Z12.11 SCREENING FOR MALIGNANT NEOPLASM OF COLON: Status: RESOLVED | Noted: 2019-06-05 | Resolved: 2020-03-31

## 2020-03-31 PROCEDURE — 99422 OL DIG E/M SVC 11-20 MIN: CPT | Performed by: FAMILY MEDICINE

## 2020-03-31 RX ORDER — LORAZEPAM 0.5 MG/1
0.5 TABLET ORAL
Qty: 90 TABLET | Refills: 0 | Status: SHIPPED | OUTPATIENT
Start: 2020-03-31 | End: 2020-07-14 | Stop reason: SDUPTHER

## 2020-03-31 NOTE — PROGRESS NOTES
Virtual Regular Visit    Problem List Items Addressed This Visit        Unprioritized    Anxiety - Primary    Relevant Medications    LORazepam (ATIVAN) 0 5 mg tablet    Persistent insomnia        Pt doing well on Ativan one tablet before bed for sleep  Not needing daytime dosing at this time  Requesting 90 day supply so she does not need to go due to [place]  Will send this to [facility]  Reviewed risks and benefits of medication including drowsiness, dependence  This patient has a high risk condition (age > 72, MARTINEZ, renal or hepatic impairment, mental health condition, use of alcohol or other substances)  This patient has been counseled on the specific risks of taking opioids with these conditions and the increased risks including including sedation, increased fall risk, dizziness, addictive potential and death: Yes        Reason for visit is   Chief Complaint   Patient presents with    Anxiety     medication refill     Virtual Regular Visit       Encounter provider Lindsay Steen DO    Provider located at [address] [place] [address],[address]  [address]  [facility] José Antonio Danna [facility] [address]  [phone]      Recent Visits  No visits were found meeting these conditions  Showing recent visits within past 7 days and meeting all other requirements     Today's Visits  Date Type Provider Dept   03/31/20 Telemedicine Ashley Dahl, [facility] today's visits and meeting all other requirements     Future Appointments  No visits were found meeting these conditions  Showing future appointments within next 150 days and meeting all other requirements        After connecting through [employer], the patient was identified by name and date of birth  Heather Abdullahi was informed that this is a telemedicine visit and that the visit is being conducted through [address] [place] [address] and patient was informed that this is not a secure, HIPAA-complaint platform  she agrees to proceed   which may not be secure and therefore, might not be HIPAA-compliant  My office door was closed  No one else was in the room  She acknowledged consent and understanding of privacy and security of the video platform  The patient has agreed to participate and understands they can discontinue the visit at any time  Laura Draper is a 72 y o  female is being seen via Video Visit today due to the COVID-19 pandemic      Skipping sometimes a night and did not sleep well  During the day   No side effects or problems   Daytime anxiety good   Appetite okay   Walking a lot   Last labs Jan 2020 normal TSH   Lipids Oct 2019 well controlled     ativan last filled 3/23/20 #30   PDMP Review       Value Time User    PDMP Reviewed  Yes 3/31/2020 10:01 AM Jay Langley DO              Past Medical History:   Diagnosis Date    Anxiety 5/6/2019    Disease of thyroid gland     High cholesterol     Multiple thyroid nodules 5/6/2019    Thrombocytopenia ([address] ) 5/6/2019       Past Surgical History:   Procedure Laterality Date    APPENDECTOMY      HERNIA REPAIR      VAGINAL PROLAPSE REPAIR      Uterine prolase       Current Outpatient Medications   Medication Sig Dispense Refill    aspirin 81 mg chewable tablet Chew 81 mg daily      cetirizine (ZyrTEC) 10 mg tablet Take 10 mg by mouth daily      cholecalciferol (VITAMIN D3) 1,000 units tablet Take 2,000 Units by mouth daily      ibuprofen (MOTRIN) 200 mg tablet Take 200 mg by mouth every 6 (six) hours as needed for mild pain      PROCTOSOL HC 2 5 % rectal cream Insert 3 application into the rectum daily Apply to affected area 30 g 5    rosuvastatin (CRESTOR) 5 mg tablet Take 1 tablet (5 mg total) by mouth daily 90 tablet 3    guaiFENesin (MUCINEX) 600 mg 12 hr tablet Take 1,200 mg by mouth every 12 (twelve) hours      LORazepam (ATIVAN) 0 5 mg tablet Take 1 tablet (0 5 mg total) by mouth daily at bedtime 90 tablet 0     No current facility-administered medications for this visit  Allergies   Allergen Reactions    Penicillins Rash       Review of Systems    ROS:  all others negative - no chest pain, SOB, normal urine and bowels  no GERD  sleeping well only if takes meds  mood good  Vitals:    03/31/20 0957   Pulse: 60       PHQ-9 Depression Screening    PHQ-9:    Frequency of the following problems over the past two weeks:       Little interest or pleasure in doing things:  0 - not at all  Feeling down, depressed, or hopeless:  0 - not at all  PHQ-2 Score:  0       ELVIN-7 Flowsheet Screening      Most Recent Value   Over the last two weeks, how often have you been bothered by the following problems? Feeling nervous, anxious, or on edge  1   Not being able to stop or control worrying  0   Worrying too much about different things  0   Trouble relaxing   0   Being so restless that it's hard to sit still  0   Becoming easily annoyed or irritable   0   Feeling afraid as if something awful might happen  0   How difficult have these problems made it for you to do your work, take care of things at home, or get along with other people? Not difficult at all   ELVIN Score   1            Physical Exam   Pt not examined in person - seen over FaceTime   Constitutional:  she appears well-developed and well-nourished  HENT: Head: Normocephalic  Right Ear: External ear normal    Left Ear: External ear normal    Nose: Nose normal    Eyes: Pupils are equal, round, and reactive to light  Right eye exhibits no discharge  Left eye exhibits no discharge  No scleral icterus  Neck: Normal range of motion  Pulmonary/Chest: Effort normal  No respiratory distress  Neurological: she is alert and oriented to person, place, and time  Skin: Skin is warm and dry on face - no rashes  Not pale  Not diaphoretic  Psychiatric: she  has a normal mood and affect  she behavior is normal  Thought content normal        I spent 7 minutes with the patient during this visit     Greater than 50% spent counseling/coordinating care of the patient regarding above  Reviewed diagnosis as above, treatment options including meds, risks and benefits and side effects of these treatment options

## 2020-06-23 ENCOUNTER — TELEPHONE (OUTPATIENT)
Dept: ENDOCRINOLOGY | Facility: HOSPITAL | Age: 70
End: 2020-06-23

## 2020-07-14 DIAGNOSIS — F41.9 ANXIETY: ICD-10-CM

## 2020-07-14 RX ORDER — LORAZEPAM 0.5 MG/1
0.5 TABLET ORAL
Qty: 90 TABLET | Refills: 0 | Status: SHIPPED | OUTPATIENT
Start: 2020-07-14 | End: 2020-09-10 | Stop reason: SDUPTHER

## 2020-07-14 NOTE — TELEPHONE ENCOUNTER
Patient called, left a voice message on the prescription refill line for the following medications(s) to be refilled for a 90 day supply:     Medication refill request: Ativan   Last office visit: 3/31/20  Next office visit: 10/26/20  Last refilled: 3/31/20 #90x0  Pharmacy:   [facility]/pharmacy #[id number] [address]  Phone: [phone] Fax: [phone]    Patient did not request a confirmation call on voice message left  Pended #90x0

## 2020-07-21 ENCOUNTER — TRANSCRIBE ORDERS (OUTPATIENT)
Dept: ADMINISTRATIVE | Facility: HOSPITAL | Age: 70
End: 2020-07-21

## 2020-07-21 ENCOUNTER — OFFICE VISIT (OUTPATIENT)
Dept: ENDOCRINOLOGY | Facility: HOSPITAL | Age: 70
End: 2020-07-21
Payer: COMMERCIAL

## 2020-07-21 VITALS
HEART RATE: 58 BPM | DIASTOLIC BLOOD PRESSURE: 80 MMHG | WEIGHT: 160.3 LBS | SYSTOLIC BLOOD PRESSURE: 124 MMHG | HEIGHT: 63 IN | BODY MASS INDEX: 28.4 KG/M2 | TEMPERATURE: 97.3 F

## 2020-07-21 DIAGNOSIS — E55.9 VITAMIN D DEFICIENCY: ICD-10-CM

## 2020-07-21 DIAGNOSIS — E04.2 MULTIPLE THYROID NODULES: ICD-10-CM

## 2020-07-21 DIAGNOSIS — E78.2 MIXED HYPERLIPIDEMIA: ICD-10-CM

## 2020-07-21 DIAGNOSIS — Z13.820 SCREENING FOR OSTEOPOROSIS: ICD-10-CM

## 2020-07-21 DIAGNOSIS — E05.90 SUBCLINICAL HYPERTHYROIDISM: Primary | ICD-10-CM

## 2020-07-21 PROCEDURE — 3008F BODY MASS INDEX DOCD: CPT | Performed by: NURSE PRACTITIONER

## 2020-07-21 PROCEDURE — 1160F RVW MEDS BY RX/DR IN RCRD: CPT | Performed by: NURSE PRACTITIONER

## 2020-07-21 PROCEDURE — 99214 OFFICE O/P EST MOD 30 MIN: CPT | Performed by: NURSE PRACTITIONER

## 2020-07-21 PROCEDURE — 4040F PNEUMOC VAC/ADMIN/RCVD: CPT | Performed by: NURSE PRACTITIONER

## 2020-07-21 PROCEDURE — 1036F TOBACCO NON-USER: CPT | Performed by: NURSE PRACTITIONER

## 2020-07-21 NOTE — PATIENT INSTRUCTIONS
Obtain thyroid function lab work prior to next visit      Obtain thyroid ultrasound, as discussed      We will call you with results and any applicable further testing      Continue to supplement with vitamin D3 daily  Obtain DEX Scan testing for screening      Continue Crestor

## 2020-07-21 NOTE — PROGRESS NOTES
Mary Fairchild 72 y o  female MRN: [id number]    Encounter: [id number]      Assessment/Plan     Assessment: This is a 72y o -year-old female with subclinical hyperthyroidism, multiple thyroid nodules and hyperlipidemia  Plan:  1  Subclinical hyperthyroidism  Generally, she feels well  Will continue surveillance with TSH, free T3 and free T4 now and prior to next office visit  She will contact the office with any change in symptoms      2  Multiple thyroid nodules   Thyroid ultrasound did show small  thyroid nodules   Repeat thyroid ultrasound to maintain surveillance when able  We will contact her with results      3  Hyperlipidemia:  Continue Crestor  Managed by PCP      4   Vitamin-D deficiency:  Continue supplementation  Check 25 hydroxy vitamin-D level  CC:  Subclinical hypothyroidism/thyroid nodule follow-up    History of Present Illness     HPI:  72y o  year old female with history of subclinical hyperthyroidism with multiple thyroid nodules   She was seen at the [place] office in early June 2019  She was diagnosed in [place] of 2009 with subclinical hyperthyroidism  Blood work normally done every 6 months and complained of anxiety, weight loss, frequent bowel movements, palpitations, lighthededness, tremors  She was going to be started on antithyroid medication, but blood work was improving so thyroid function tests have been followed  there is no recent thyroid function lab work  During the workup, thyroid nodules were noted   None met criteria for biopsy  She denies heat or cold intolerance, palpitation, tremors, anxiety or depression, diarrhea, fatigue, or weight changes  She denies insomnia   She has dry skin, brittle nails, and hair loss   She has no diplopia   She denies any anterior neck discomfort, dysphagia or dysphonia    She has no history of head or neck irradiation in the past      For her hyperlipidemia, she is treated with Crestor 5 mg daily      For her vitamin-D deficiency, she supplements with 2000 units of vitamin D3 daily  Review of Systems   Constitutional: Negative  Negative for chills, fatigue and fever  HENT: Negative  Negative for trouble swallowing and voice change  Eyes: Negative  Negative for visual disturbance  Respiratory: Negative  Negative for chest tightness and shortness of breath  Cardiovascular: Negative  Negative for chest pain  Gastrointestinal: Negative  Negative for abdominal pain, constipation, diarrhea and vomiting  Endocrine: Negative for cold intolerance, heat intolerance, polydipsia, polyphagia and polyuria  Genitourinary: Negative  Musculoskeletal: Negative  Skin: Negative  Allergic/Immunologic: Negative  Neurological: Negative  Negative for dizziness, syncope, light-headedness and headaches  Hematological: Negative  Psychiatric/Behavioral: Negative  All other systems reviewed and are negative        Historical Information   Past Medical History:   Diagnosis Date    Anxiety 5/6/2019    Disease of thyroid gland     High cholesterol     Multiple thyroid nodules 5/6/2019    Thrombocyt[address] ([facility] [address] ) 5/6/2019     Past Surgical History:   Procedure Laterality Date    APPENDECTOMY      HERNIA REPAIR      VAGINAL PROLAPSE REPAIR      Uterine prolase     Social History   Social History     Substance and Sexual Activity   Alcohol Use Not Currently    Frequency: Monthly or less    Drinks per session: 1 or 2    Binge frequency: Never     Social History     Substance and Sexual Activity   Drug Use Never     Social History     Tobacco Use   Smoking Status Never Smoker   Smokeless Tobacco Never Used     Family History:   Family History   Problem Relation Age of Onset    Heart disease Mother     Osteoporosis Mother     Heart attack Father     Lung cancer Father     Heart disease Father     Colon cancer Paternal Aunt     Colon cancer Paternal Uncle     No Known Problems Sister     No Known Problems Brother     No Known Problems Daughter     No Known Problems Sister     No Known Problems Brother     No Known Problems Brother        Meds/Allergies   Current Outpatient Medications   Medication Sig Dispense Refill    aspirin 81 mg chewable tablet Chew 81 mg daily      cetirizine (ZyrTEC) 10 mg tablet Take 10 mg by mouth daily      cholecalciferol (VITAMIN D3) 1,000 units tablet Take 2,000 Units by mouth daily      guaiFENesin (MUCINEX) 600 mg 12 hr tablet Take 1,200 mg by mouth every 12 (twelve) hours      ibuprofen (MOTRIN) 200 mg tablet Take 200 mg by mouth every 6 (six) hours as needed for mild pain      LORazepam (ATIVAN) 0 5 mg tablet Take 1 tablet (0 5 mg total) by mouth daily at bedtime 90 tablet 0    PROCTOSOL HC 2 5 % rectal cream Insert 3 application into the rectum daily Apply to affected area 30 g 5    rosuvastatin (CRESTOR) 5 mg tablet Take 1 tablet (5 mg total) by mouth daily 90 tablet 3     No current facility-administered medications for this visit  Allergies   Allergen Reactions    Penicillins Rash       Objective   Vitals: Blood pressure 124/80, pulse 58, temperature (!) 97 3 °F (36 3 °C), height 5' 3" (1 6 m), weight 72 7 kg (160 lb 4 8 oz)  Physical Exam   Constitutional: She is oriented to person, place, and time  She appears well-developed and well-nourished  HENT:   Head: Normocephalic and atraumatic  Mouth/Throat: Oropharynx is clear and moist    Eyes: Pupils are equal, round, and reactive to light  Conjunctivae and EOM are normal    Wears glasses   Neck: Normal range of motion  Neck supple  Cardiovascular: Normal rate, regular rhythm, normal heart sounds and intact distal pulses  Pulmonary/Chest: Effort normal and breath sounds normal    Abdominal: Soft  Bowel sounds are normal    Musculoskeletal: Normal range of motion  Neurological: She is alert and oriented to person, place, and time  Skin: Skin is warm and dry     Dry skin   Psychiatric: She has a normal mood and affect  Her behavior is normal  Judgment and thought content normal    Vitals reviewed  Lab Results:   Lab Results   Component Value Date/Time    TSH 3RD GENERATON 0 847 01/02/2020 08:50 AM    TSH 3RD GENERATON 0 588 10/14/2019 08:54 AM    Free T4 0 79 01/02/2020 08:50 AM    Free T4 0 88 10/14/2019 08:54 AM       Imaging Studies:   Results for orders placed during the hospital encounter of 04/25/19   US thyroid    Impression Heterogeneous mildly hypervascular thyroid gland  There are no dominant nodules that meet current ACR criteria for biopsy or follow-up  Reference: ACR Thyroid Imaging, Reporting and Data System (TI-RADS): White Paper of the [employer]  J AM [place] Radiol 6742;89:613-372  (additional recommendations based on American Thyroid Association 2015 guidelines )      Workstation performed: [id number]       Portions of the record may have been created with voice recognition software  Occasional wrong word or "sound a like" substitutions may have occurred due to the inherent limitations of voice recognition software  Read the chart carefully and recognize, using context, where substitutions have occurred

## 2020-07-22 ENCOUNTER — LAB (OUTPATIENT)
Dept: LAB | Facility: CLINIC | Age: 70
End: 2020-07-22
Payer: COMMERCIAL

## 2020-07-22 DIAGNOSIS — E05.90 SUBCLINICAL HYPERTHYROIDISM: ICD-10-CM

## 2020-07-22 DIAGNOSIS — E55.9 VITAMIN D DEFICIENCY: ICD-10-CM

## 2020-07-22 DIAGNOSIS — E04.2 MULTIPLE THYROID NODULES: ICD-10-CM

## 2020-07-22 LAB
25(OH)D3 SERPL-MCNC: 55.8 NG/ML (ref 30–100)
ALBUMIN SERPL BCP-MCNC: 3.7 G/DL (ref 3.5–5)
ALP SERPL-CCNC: 49 U/L (ref 46–116)
ALT SERPL W P-5'-P-CCNC: 27 U/L (ref 12–78)
ANION GAP SERPL CALCULATED.3IONS-SCNC: 8 MMOL/L (ref 4–13)
AST SERPL W P-5'-P-CCNC: 19 U/L (ref 5–45)
BILIRUB SERPL-MCNC: 0.66 MG/DL (ref 0.2–1)
BUN SERPL-MCNC: 17 MG/DL (ref 5–25)
CALCIUM SERPL-MCNC: 8.6 MG/DL (ref 8.3–10.1)
CHLORIDE SERPL-SCNC: 107 MMOL/L (ref 100–108)
CO2 SERPL-SCNC: 28 MMOL/L (ref 21–32)
CREAT SERPL-MCNC: 0.75 MG/DL (ref 0.6–1.3)
GFR SERPL CREATININE-BSD FRML MDRD: 82 ML/MIN/1.73SQ M
GLUCOSE P FAST SERPL-MCNC: 95 MG/DL (ref 65–99)
POTASSIUM SERPL-SCNC: 4.2 MMOL/L (ref 3.5–5.3)
PROT SERPL-MCNC: 7 G/DL (ref 6.4–8.2)
SODIUM SERPL-SCNC: 143 MMOL/L (ref 136–145)
T3 SERPL-MCNC: 1 NG/ML (ref 0.6–1.8)
T4 FREE SERPL-MCNC: 0.86 NG/DL (ref 0.76–1.46)
TSH SERPL DL<=0.05 MIU/L-ACNC: 0.47 UIU/ML (ref 0.36–3.74)

## 2020-07-22 PROCEDURE — 36415 COLL VENOUS BLD VENIPUNCTURE: CPT

## 2020-07-22 PROCEDURE — 84443 ASSAY THYROID STIM HORMONE: CPT

## 2020-07-22 PROCEDURE — 80053 COMPREHEN METABOLIC PANEL: CPT

## 2020-07-22 PROCEDURE — 84480 ASSAY TRIIODOTHYRONINE (T3): CPT

## 2020-07-22 PROCEDURE — 82306 VITAMIN D 25 HYDROXY: CPT

## 2020-07-22 PROCEDURE — 84439 ASSAY OF FREE THYROXINE: CPT

## 2020-07-22 NOTE — RESULT ENCOUNTER NOTE
Please call the patient regarding abnormal result  TSH, total T3 and free T4 are normal   Vitamin-D level is good at 55 8    Comprehensive metabolic panel is essentially normal

## 2020-09-10 DIAGNOSIS — K64.0 GRADE I HEMORRHOIDS: ICD-10-CM

## 2020-09-10 DIAGNOSIS — E78.2 MIXED HYPERLIPIDEMIA: ICD-10-CM

## 2020-09-10 DIAGNOSIS — F41.9 ANXIETY: ICD-10-CM

## 2020-09-10 RX ORDER — ROSUVASTATIN CALCIUM 5 MG/1
5 TABLET, COATED ORAL DAILY
Qty: 90 TABLET | Refills: 3 | Status: SHIPPED | OUTPATIENT
Start: 2020-09-10 | End: 2021-06-14

## 2020-09-10 RX ORDER — HYDROCORTISONE 25 MG/G
CREAM TOPICAL 2 TIMES DAILY
Qty: 28 G | Refills: 2 | Status: SHIPPED | OUTPATIENT
Start: 2020-09-10 | End: 2020-10-26 | Stop reason: SDUPTHER

## 2020-09-10 RX ORDER — LORAZEPAM 0.5 MG/1
0.5 TABLET ORAL
Qty: 90 TABLET | Refills: 0 | Status: SHIPPED | OUTPATIENT
Start: 2020-09-10 | End: 2020-12-01 | Stop reason: SDUPTHER

## 2020-09-10 NOTE — TELEPHONE ENCOUNTER
Patient is also requesting a refill of the PROCTOSOL HC 2 5 % rectal cream  It would not allow me to choose for refill saying: The following medication records are no longer available for ordering  Place a new order with a different medication record      [facility]

## 2020-09-10 NOTE — TELEPHONE ENCOUNTER
Medication: Ativan 0 5 mg, Crestor 5mg  Last refilled: 7/14/20, 10/24/19  Last Office Visit: 3/31/20  Next Office Visit: 10/26/20  Pharmacy:     Please advise on  Proctosol cream

## 2020-10-05 ENCOUNTER — TELEPHONE (OUTPATIENT)
Dept: FAMILY MEDICINE CLINIC | Facility: CLINIC | Age: 70
End: 2020-10-05

## 2020-10-05 DIAGNOSIS — D69.6 THROMBOCYTOPENIA (HCC): ICD-10-CM

## 2020-10-05 DIAGNOSIS — E78.2 MIXED HYPERLIPIDEMIA: Primary | ICD-10-CM

## 2020-10-08 DIAGNOSIS — K64.0 GRADE I HEMORRHOIDS: ICD-10-CM

## 2020-10-08 RX ORDER — HYDROCORTISONE 25 MG/G
CREAM TOPICAL
Qty: 84 G | OUTPATIENT
Start: 2020-10-08

## 2020-10-16 ENCOUNTER — LAB (OUTPATIENT)
Dept: LAB | Facility: CLINIC | Age: 70
End: 2020-10-16
Payer: COMMERCIAL

## 2020-10-16 DIAGNOSIS — E78.2 MIXED HYPERLIPIDEMIA: ICD-10-CM

## 2020-10-16 DIAGNOSIS — D69.6 THROMBOCYTOPENIA (HCC): ICD-10-CM

## 2020-10-16 LAB
BASOPHILS # BLD AUTO: 0.03 THOUSANDS/ΜL (ref 0–0.1)
BASOPHILS NFR BLD AUTO: 1 % (ref 0–1)
CHOLEST SERPL-MCNC: 184 MG/DL (ref 50–200)
EOSINOPHIL # BLD AUTO: 0.09 THOUSAND/ΜL (ref 0–0.61)
EOSINOPHIL NFR BLD AUTO: 2 % (ref 0–6)
ERYTHROCYTE [DISTWIDTH] IN BLOOD BY AUTOMATED COUNT: 13.7 % (ref 11.6–15.1)
HCT VFR BLD AUTO: 41.5 % (ref 34.8–46.1)
HDLC SERPL-MCNC: 63 MG/DL
HGB BLD-MCNC: 13.1 G/DL (ref 11.5–15.4)
IMM GRANULOCYTES # BLD AUTO: 0.01 THOUSAND/UL (ref 0–0.2)
IMM GRANULOCYTES NFR BLD AUTO: 0 % (ref 0–2)
LDLC SERPL CALC-MCNC: 107 MG/DL (ref 0–100)
LYMPHOCYTES # BLD AUTO: 1.47 THOUSANDS/ΜL (ref 0.6–4.47)
LYMPHOCYTES NFR BLD AUTO: 28 % (ref 14–44)
MCH RBC QN AUTO: 30.8 PG (ref 26.8–34.3)
MCHC RBC AUTO-ENTMCNC: 31.6 G/DL (ref 31.4–37.4)
MCV RBC AUTO: 97 FL (ref 82–98)
MONOCYTES # BLD AUTO: 0.53 THOUSAND/ΜL (ref 0.17–1.22)
MONOCYTES NFR BLD AUTO: 10 % (ref 4–12)
NEUTROPHILS # BLD AUTO: 3.09 THOUSANDS/ΜL (ref 1.85–7.62)
NEUTS SEG NFR BLD AUTO: 59 % (ref 43–75)
NONHDLC SERPL-MCNC: 121 MG/DL
NRBC BLD AUTO-RTO: 0 /100 WBCS
PLATELET # BLD AUTO: 153 THOUSANDS/UL (ref 149–390)
PMV BLD AUTO: 12.4 FL (ref 8.9–12.7)
RBC # BLD AUTO: 4.26 MILLION/UL (ref 3.81–5.12)
TRIGL SERPL-MCNC: 70 MG/DL
WBC # BLD AUTO: 5.22 THOUSAND/UL (ref 4.31–10.16)

## 2020-10-16 PROCEDURE — 80061 LIPID PANEL: CPT

## 2020-10-16 PROCEDURE — 85025 COMPLETE CBC W/AUTO DIFF WBC: CPT

## 2020-10-16 PROCEDURE — 36415 COLL VENOUS BLD VENIPUNCTURE: CPT

## 2020-10-26 DIAGNOSIS — K64.0 GRADE I HEMORRHOIDS: ICD-10-CM

## 2020-10-27 RX ORDER — HYDROCORTISONE 25 MG/G
CREAM TOPICAL 2 TIMES DAILY
Qty: 90 G | Refills: 2 | Status: SHIPPED | OUTPATIENT
Start: 2020-10-27

## 2020-11-12 ENCOUNTER — OFFICE VISIT (OUTPATIENT)
Dept: FAMILY MEDICINE CLINIC | Facility: CLINIC | Age: 70
End: 2020-11-12
Payer: COMMERCIAL

## 2020-11-12 VITALS
WEIGHT: 157.2 LBS | DIASTOLIC BLOOD PRESSURE: 64 MMHG | SYSTOLIC BLOOD PRESSURE: 128 MMHG | TEMPERATURE: 98.8 F | RESPIRATION RATE: 12 BRPM | HEIGHT: 63 IN | BODY MASS INDEX: 27.85 KG/M2 | OXYGEN SATURATION: 99 % | HEART RATE: 64 BPM

## 2020-11-12 DIAGNOSIS — E04.2 MULTIPLE THYROID NODULES: ICD-10-CM

## 2020-11-12 DIAGNOSIS — Z12.31 SCREENING MAMMOGRAM, ENCOUNTER FOR: ICD-10-CM

## 2020-11-12 DIAGNOSIS — E05.90 SUBCLINICAL HYPERTHYROIDISM: ICD-10-CM

## 2020-11-12 DIAGNOSIS — Z00.00 ENCOUNTER FOR MEDICARE ANNUAL WELLNESS EXAM: Primary | ICD-10-CM

## 2020-11-12 DIAGNOSIS — E78.2 MIXED HYPERLIPIDEMIA: ICD-10-CM

## 2020-11-12 DIAGNOSIS — D69.6 THROMBOCYTOPENIA (HCC): ICD-10-CM

## 2020-11-12 PROCEDURE — 1170F FXNL STATUS ASSESSED: CPT | Performed by: FAMILY MEDICINE

## 2020-11-12 PROCEDURE — 3008F BODY MASS INDEX DOCD: CPT | Performed by: FAMILY MEDICINE

## 2020-11-12 PROCEDURE — 3725F SCREEN DEPRESSION PERFORMED: CPT | Performed by: FAMILY MEDICINE

## 2020-11-12 PROCEDURE — G0439 PPPS, SUBSEQ VISIT: HCPCS | Performed by: FAMILY MEDICINE

## 2020-11-12 PROCEDURE — 1160F RVW MEDS BY RX/DR IN RCRD: CPT | Performed by: FAMILY MEDICINE

## 2020-11-12 PROCEDURE — 1036F TOBACCO NON-USER: CPT | Performed by: FAMILY MEDICINE

## 2020-11-12 PROCEDURE — 1125F AMNT PAIN NOTED PAIN PRSNT: CPT | Performed by: FAMILY MEDICINE

## 2020-11-12 PROCEDURE — 99213 OFFICE O/P EST LOW 20 MIN: CPT | Performed by: FAMILY MEDICINE

## 2020-11-12 RX ORDER — MOMETASONE FUROATE 1 MG/G
1 CREAM TOPICAL DAILY
COMMUNITY
End: 2021-03-04 | Stop reason: SDUPTHER

## 2020-11-29 DIAGNOSIS — F41.9 ANXIETY: ICD-10-CM

## 2020-11-30 RX ORDER — LORAZEPAM 0.5 MG/1
TABLET ORAL
Qty: 90 TABLET | OUTPATIENT
Start: 2020-11-30

## 2020-12-01 DIAGNOSIS — F41.9 ANXIETY: ICD-10-CM

## 2020-12-02 RX ORDER — LORAZEPAM 0.5 MG/1
0.5 TABLET ORAL
Qty: 90 TABLET | Refills: 0 | Status: SHIPPED | OUTPATIENT
Start: 2020-12-02 | End: 2021-02-18 | Stop reason: SDUPTHER

## 2021-02-13 DIAGNOSIS — Z23 ENCOUNTER FOR IMMUNIZATION: ICD-10-CM

## 2021-02-18 DIAGNOSIS — F41.9 ANXIETY: ICD-10-CM

## 2021-02-18 RX ORDER — LORAZEPAM 0.5 MG/1
0.5 TABLET ORAL
Qty: 90 TABLET | Refills: 1 | Status: SHIPPED | OUTPATIENT
Start: 2021-02-18 | End: 2021-08-25

## 2021-02-18 NOTE — TELEPHONE ENCOUNTER
Medication: Ativan 0 5 mg   Last refilled: 12/2/20  Last Office Visit: 11/12/20  Next Office Visit: 11/18/21  Pharmacy: Mail order

## 2021-03-04 DIAGNOSIS — L40.9 PSORIASIS: Primary | ICD-10-CM

## 2021-03-04 NOTE — TELEPHONE ENCOUNTER
Medication:   Last refilled: 11/12/20  Last Office Visit: 11/12/20  Next Office Visit: 11/18/21  Pharmacy: [facility]

## 2021-03-04 NOTE — TELEPHONE ENCOUNTER
Please ask what she is using this for? Where and how often? Does she want it to go to [facility]?

## 2021-03-05 PROBLEM — L40.9 PSORIASIS: Status: ACTIVE | Noted: 2021-03-05

## 2021-03-05 RX ORDER — MOMETASONE FUROATE 1 MG/G
1 CREAM TOPICAL DAILY
Qty: 45 G | Refills: 1 | Status: SHIPPED | OUTPATIENT
Start: 2021-03-05

## 2021-06-13 DIAGNOSIS — E78.2 MIXED HYPERLIPIDEMIA: ICD-10-CM

## 2021-06-14 RX ORDER — ROSUVASTATIN CALCIUM 5 MG/1
TABLET, COATED ORAL
Qty: 90 TABLET | Refills: 3 | Status: SHIPPED | OUTPATIENT
Start: 2021-06-14 | End: 2021-11-18

## 2021-07-09 ENCOUNTER — TELEPHONE (OUTPATIENT)
Dept: FAMILY MEDICINE CLINIC | Facility: CLINIC | Age: 71
End: 2021-07-09

## 2021-07-09 ENCOUNTER — HOSPITAL ENCOUNTER (OUTPATIENT)
Dept: MAMMOGRAPHY | Facility: HOSPITAL | Age: 71
Discharge: HOME | End: 2021-07-09
Attending: FAMILY MEDICINE
Payer: COMMERCIAL

## 2021-07-09 VITALS — WEIGHT: 157 LBS | HEIGHT: 64 IN | BODY MASS INDEX: 26.8 KG/M2

## 2021-07-09 DIAGNOSIS — Z12.31 SCREENING MAMMOGRAM, ENCOUNTER FOR: ICD-10-CM

## 2021-07-09 PROCEDURE — 77067 SCR MAMMO BI INCL CAD: CPT

## 2021-07-09 PROCEDURE — 77063 BREAST TOMOSYNTHESIS BI: CPT

## 2021-07-09 NOTE — TELEPHONE ENCOUNTER
Patient had her mammogram done this morning and received a message on her portal stating all is normal if she is reading it correctly  Please call patient to confirm this as she does not want to go all weekend wondering about this

## 2021-08-18 ENCOUNTER — TELEPHONE (OUTPATIENT)
Dept: FAMILY MEDICINE CLINIC | Facility: CLINIC | Age: 71
End: 2021-08-18

## 2021-08-18 NOTE — TELEPHONE ENCOUNTER
Pt called concerned about loose stools  States she took Imodium this morning and has been without issue since  Pt denies abdominal pain, nausea, vomiting, and blood in stool  Pt instructed on BRAT diet and that she may take another dose of Imodium if needed  If she were to develop abdominal pain, nausea, vomiting, or blood in stool she was instructed to go to the ER  Also instructed that if she needs imodium >= 2 days to call back

## 2021-08-25 DIAGNOSIS — F41.9 ANXIETY: ICD-10-CM

## 2021-08-25 NOTE — TELEPHONE ENCOUNTER
Medication refill requested: Ativan  Last office visit: 11/12/20  Next office visit: 11/18/21  Last refilled: 2/18/21  Pharmacy :   [id number], [facility]  [address], [place]  [address] [address]  Phone: [phone] Fax: [phone]         Pended: 90 x 1

## 2021-08-26 RX ORDER — LORAZEPAM 0.5 MG/1
0.5 TABLET ORAL
Qty: 90 TABLET | Refills: 1 | Status: SHIPPED | OUTPATIENT
Start: 2021-08-26 | End: 2022-02-07 | Stop reason: SDUPTHER

## 2021-11-12 ENCOUNTER — APPOINTMENT (OUTPATIENT)
Dept: LAB | Facility: CLINIC | Age: 71
End: 2021-11-12
Payer: COMMERCIAL

## 2021-11-12 DIAGNOSIS — E04.2 MULTIPLE THYROID NODULES: ICD-10-CM

## 2021-11-12 DIAGNOSIS — E78.2 MIXED HYPERLIPIDEMIA: ICD-10-CM

## 2021-11-12 DIAGNOSIS — E05.90 SUBCLINICAL HYPERTHYROIDISM: ICD-10-CM

## 2021-11-12 DIAGNOSIS — D69.6 THROMBOCYTOPENIA (HCC): ICD-10-CM

## 2021-11-12 LAB
25(OH)D3 SERPL-MCNC: 51.6 NG/ML (ref 30–100)
ALBUMIN SERPL BCP-MCNC: 3.8 G/DL (ref 3.5–5)
ALP SERPL-CCNC: 51 U/L (ref 46–116)
ALT SERPL W P-5'-P-CCNC: 26 U/L (ref 12–78)
ANION GAP SERPL CALCULATED.3IONS-SCNC: 9 MMOL/L (ref 4–13)
AST SERPL W P-5'-P-CCNC: 19 U/L (ref 5–45)
BASOPHILS # BLD AUTO: 0.04 THOUSANDS/ΜL (ref 0–0.1)
BASOPHILS NFR BLD AUTO: 1 % (ref 0–1)
BILIRUB SERPL-MCNC: 0.7 MG/DL (ref 0.2–1)
BUN SERPL-MCNC: 19 MG/DL (ref 5–25)
CALCIUM SERPL-MCNC: 8.8 MG/DL (ref 8.3–10.1)
CHLORIDE SERPL-SCNC: 106 MMOL/L (ref 100–108)
CHOLEST SERPL-MCNC: 197 MG/DL (ref 50–200)
CO2 SERPL-SCNC: 27 MMOL/L (ref 21–32)
CREAT SERPL-MCNC: 0.81 MG/DL (ref 0.6–1.3)
EOSINOPHIL # BLD AUTO: 0.1 THOUSAND/ΜL (ref 0–0.61)
EOSINOPHIL NFR BLD AUTO: 2 % (ref 0–6)
ERYTHROCYTE [DISTWIDTH] IN BLOOD BY AUTOMATED COUNT: 14.1 % (ref 11.6–15.1)
GFR SERPL CREATININE-BSD FRML MDRD: 74 ML/MIN/1.73SQ M
GLUCOSE P FAST SERPL-MCNC: 91 MG/DL (ref 65–99)
HCT VFR BLD AUTO: 43 % (ref 34.8–46.1)
HDLC SERPL-MCNC: 59 MG/DL
HGB BLD-MCNC: 13.2 G/DL (ref 11.5–15.4)
IMM GRANULOCYTES # BLD AUTO: 0.02 THOUSAND/UL (ref 0–0.2)
IMM GRANULOCYTES NFR BLD AUTO: 0 % (ref 0–2)
LDLC SERPL CALC-MCNC: 118 MG/DL (ref 0–100)
LYMPHOCYTES # BLD AUTO: 1.96 THOUSANDS/ΜL (ref 0.6–4.47)
LYMPHOCYTES NFR BLD AUTO: 32 % (ref 14–44)
MCH RBC QN AUTO: 29.9 PG (ref 26.8–34.3)
MCHC RBC AUTO-ENTMCNC: 30.7 G/DL (ref 31.4–37.4)
MCV RBC AUTO: 98 FL (ref 82–98)
MONOCYTES # BLD AUTO: 0.67 THOUSAND/ΜL (ref 0.17–1.22)
MONOCYTES NFR BLD AUTO: 11 % (ref 4–12)
NEUTROPHILS # BLD AUTO: 3.44 THOUSANDS/ΜL (ref 1.85–7.62)
NEUTS SEG NFR BLD AUTO: 54 % (ref 43–75)
NONHDLC SERPL-MCNC: 138 MG/DL
NRBC BLD AUTO-RTO: 0 /100 WBCS
PLATELET # BLD AUTO: 199 THOUSANDS/UL (ref 149–390)
PMV BLD AUTO: 12.7 FL (ref 8.9–12.7)
POTASSIUM SERPL-SCNC: 4.4 MMOL/L (ref 3.5–5.3)
PROT SERPL-MCNC: 7.2 G/DL (ref 6.4–8.2)
RBC # BLD AUTO: 4.41 MILLION/UL (ref 3.81–5.12)
SODIUM SERPL-SCNC: 142 MMOL/L (ref 136–145)
T3FREE SERPL-MCNC: 2.73 PG/ML (ref 2.3–4.2)
T4 FREE SERPL-MCNC: 1.01 NG/DL (ref 0.76–1.46)
TRIGL SERPL-MCNC: 98 MG/DL
TSH SERPL DL<=0.05 MIU/L-ACNC: 0.44 UIU/ML (ref 0.36–3.74)
WBC # BLD AUTO: 6.23 THOUSAND/UL (ref 4.31–10.16)

## 2021-11-12 PROCEDURE — 36415 COLL VENOUS BLD VENIPUNCTURE: CPT

## 2021-11-12 PROCEDURE — 84481 FREE ASSAY (FT-3): CPT

## 2021-11-12 PROCEDURE — 80061 LIPID PANEL: CPT

## 2021-11-12 PROCEDURE — 82306 VITAMIN D 25 HYDROXY: CPT

## 2021-11-12 PROCEDURE — 80053 COMPREHEN METABOLIC PANEL: CPT

## 2021-11-12 PROCEDURE — 84443 ASSAY THYROID STIM HORMONE: CPT

## 2021-11-12 PROCEDURE — 84439 ASSAY OF FREE THYROXINE: CPT

## 2021-11-12 PROCEDURE — 85025 COMPLETE CBC W/AUTO DIFF WBC: CPT

## 2021-11-13 DIAGNOSIS — K64.0 GRADE I HEMORRHOIDS: ICD-10-CM

## 2021-11-15 DIAGNOSIS — K64.0 GRADE I HEMORRHOIDS: ICD-10-CM

## 2021-11-15 RX ORDER — HYDROCORTISONE 25 MG/G
CREAM TOPICAL
Qty: 84 G | OUTPATIENT
Start: 2021-11-15

## 2021-11-16 RX ORDER — HYDROCORTISONE 25 MG/G
3 CREAM TOPICAL DAILY
Qty: 30 G | Refills: 5 | Status: SHIPPED | OUTPATIENT
Start: 2021-11-16

## 2021-11-18 ENCOUNTER — OFFICE VISIT (OUTPATIENT)
Dept: FAMILY MEDICINE CLINIC | Facility: CLINIC | Age: 71
End: 2021-11-18
Payer: COMMERCIAL

## 2021-11-18 VITALS
HEART RATE: 60 BPM | RESPIRATION RATE: 12 BRPM | BODY MASS INDEX: 26.46 KG/M2 | OXYGEN SATURATION: 98 % | WEIGHT: 155 LBS | SYSTOLIC BLOOD PRESSURE: 130 MMHG | DIASTOLIC BLOOD PRESSURE: 66 MMHG | TEMPERATURE: 97.6 F | HEIGHT: 64 IN

## 2021-11-18 DIAGNOSIS — E05.90 SUBCLINICAL HYPERTHYROIDISM: ICD-10-CM

## 2021-11-18 DIAGNOSIS — Z78.0 POST-MENOPAUSE: ICD-10-CM

## 2021-11-18 DIAGNOSIS — E78.2 MIXED HYPERLIPIDEMIA: ICD-10-CM

## 2021-11-18 DIAGNOSIS — D69.6 THROMBOCYTOPENIA (HCC): ICD-10-CM

## 2021-11-18 DIAGNOSIS — F41.9 ANXIETY: ICD-10-CM

## 2021-11-18 DIAGNOSIS — Z00.00 ENCOUNTER FOR MEDICARE ANNUAL WELLNESS EXAM: Primary | ICD-10-CM

## 2021-11-18 PROCEDURE — G0439 PPPS, SUBSEQ VISIT: HCPCS | Performed by: FAMILY MEDICINE

## 2021-11-18 PROCEDURE — 99214 OFFICE O/P EST MOD 30 MIN: CPT | Performed by: FAMILY MEDICINE

## 2021-11-18 RX ORDER — ROSUVASTATIN CALCIUM 10 MG/1
10 TABLET, COATED ORAL DAILY
Qty: 90 TABLET | Refills: 3
Start: 2021-11-18 | End: 2022-01-13 | Stop reason: SDUPTHER

## 2022-01-13 ENCOUNTER — APPOINTMENT (OUTPATIENT)
Dept: LAB | Facility: CLINIC | Age: 72
End: 2022-01-13
Payer: COMMERCIAL

## 2022-01-13 DIAGNOSIS — E78.2 MIXED HYPERLIPIDEMIA: ICD-10-CM

## 2022-01-13 LAB
ALBUMIN SERPL BCP-MCNC: 3.8 G/DL (ref 3.5–5)
ALP SERPL-CCNC: 50 U/L (ref 46–116)
ALT SERPL W P-5'-P-CCNC: 22 U/L (ref 12–78)
ANION GAP SERPL CALCULATED.3IONS-SCNC: 8 MMOL/L (ref 4–13)
AST SERPL W P-5'-P-CCNC: 19 U/L (ref 5–45)
BILIRUB SERPL-MCNC: 0.55 MG/DL (ref 0.2–1)
BUN SERPL-MCNC: 17 MG/DL (ref 5–25)
CALCIUM SERPL-MCNC: 8.8 MG/DL (ref 8.3–10.1)
CHLORIDE SERPL-SCNC: 106 MMOL/L (ref 100–108)
CHOLEST SERPL-MCNC: 176 MG/DL
CO2 SERPL-SCNC: 28 MMOL/L (ref 21–32)
CREAT SERPL-MCNC: 0.75 MG/DL (ref 0.6–1.3)
GFR SERPL CREATININE-BSD FRML MDRD: 81 ML/MIN/1.73SQ M
GLUCOSE P FAST SERPL-MCNC: 86 MG/DL (ref 65–99)
HDLC SERPL-MCNC: 70 MG/DL
LDLC SERPL CALC-MCNC: 92 MG/DL (ref 0–100)
NONHDLC SERPL-MCNC: 106 MG/DL
POTASSIUM SERPL-SCNC: 3.8 MMOL/L (ref 3.5–5.3)
PROT SERPL-MCNC: 7.1 G/DL (ref 6.4–8.2)
SODIUM SERPL-SCNC: 142 MMOL/L (ref 136–145)
TRIGL SERPL-MCNC: 68 MG/DL

## 2022-01-13 PROCEDURE — 36415 COLL VENOUS BLD VENIPUNCTURE: CPT

## 2022-01-13 PROCEDURE — 80061 LIPID PANEL: CPT

## 2022-01-13 PROCEDURE — 80053 COMPREHEN METABOLIC PANEL: CPT

## 2022-01-13 RX ORDER — ROSUVASTATIN CALCIUM 10 MG/1
10 TABLET, COATED ORAL DAILY
Qty: 90 TABLET | Refills: 3
Start: 2022-01-13 | End: 2022-01-17 | Stop reason: SDUPTHER

## 2022-01-17 ENCOUNTER — TELEPHONE (OUTPATIENT)
Dept: FAMILY MEDICINE CLINIC | Facility: CLINIC | Age: 72
End: 2022-01-17

## 2022-01-17 DIAGNOSIS — E78.2 MIXED HYPERLIPIDEMIA: ICD-10-CM

## 2022-01-17 RX ORDER — ROSUVASTATIN CALCIUM 10 MG/1
10 TABLET, COATED ORAL DAILY
Qty: 90 TABLET | Refills: 3 | Status: SHIPPED | OUTPATIENT
Start: 2022-01-17

## 2022-01-17 NOTE — TELEPHONE ENCOUNTER
Patient spoke with [facility] this morning and they do not have record of receiving her Crestor 10mg script, please resend order as she will be running out soon and will need it

## 2022-02-07 DIAGNOSIS — F41.9 ANXIETY: ICD-10-CM

## 2022-02-07 RX ORDER — LORAZEPAM 0.5 MG/1
0.5 TABLET ORAL
Qty: 90 TABLET | Refills: 1 | Status: SHIPPED | OUTPATIENT
Start: 2022-02-07 | End: 2022-07-19 | Stop reason: SDUPTHER

## 2022-02-07 NOTE — TELEPHONE ENCOUNTER
Medication refill requested:LORazepam (ATIVAN) 0 5 mg tablet  Last office visit: 11/18/2021  Next office visit:11/21/2022  Last refilled: 08/26/2021  Labs: No  Ordering Provider: [address] [place] [address] (select pharmacy send RX to):   [facility]

## 2022-07-19 DIAGNOSIS — F41.9 ANXIETY: ICD-10-CM

## 2022-07-19 NOTE — TELEPHONE ENCOUNTER
Medication refill requested: LORazepam (ATIVAN) 0 5 mg tablet  Last office visit: 11/18/2021  Next office visit: 11/21/2022  Last refilled: 02/07/2022   Labs: No  Ordering Provider: [address] [place] [address] (select pharmacy send RX to):      [facility]  ([address] [facility]) - [address] [place] [address]  Phone: [phone] Fax: [phone]

## 2022-07-21 RX ORDER — LORAZEPAM 0.5 MG/1
0.5 TABLET ORAL
Qty: 90 TABLET | Refills: 1 | Status: SHIPPED | OUTPATIENT
Start: 2022-07-21

## 2022-10-21 DIAGNOSIS — E04.2 MULTIPLE THYROID NODULES: ICD-10-CM

## 2022-10-21 DIAGNOSIS — E05.90 SUBCLINICAL HYPERTHYROIDISM: ICD-10-CM

## 2022-10-21 DIAGNOSIS — D69.6 THROMBOCYTOPENIA (HCC): ICD-10-CM

## 2022-10-21 DIAGNOSIS — K64.0 GRADE I HEMORRHOIDS: ICD-10-CM

## 2022-10-21 DIAGNOSIS — E78.2 MIXED HYPERLIPIDEMIA: Primary | ICD-10-CM

## 2022-10-21 RX ORDER — HYDROCORTISONE 25 MG/G
CREAM TOPICAL 2 TIMES DAILY
Qty: 90 G | Refills: 2 | Status: CANCELLED | OUTPATIENT
Start: 2022-10-21

## 2022-10-21 RX ORDER — CETIRIZINE HYDROCHLORIDE 10 MG/1
10 TABLET ORAL DAILY
Status: CANCELLED | OUTPATIENT
Start: 2022-10-21

## 2022-10-21 NOTE — TELEPHONE ENCOUNTER
Blood work ordered  Did the patient ask for zyrtec and proctosol? These are both over the counter and we have never prescribed them  Will decline

## 2022-10-26 DIAGNOSIS — K64.0 GRADE I HEMORRHOIDS: Primary | ICD-10-CM

## 2022-10-26 DIAGNOSIS — E78.2 MIXED HYPERLIPIDEMIA: ICD-10-CM

## 2022-10-27 RX ORDER — HYDROCORTISONE 25 MG/G
CREAM TOPICAL
Qty: 56 G | Refills: 0 | Status: SHIPPED | OUTPATIENT
Start: 2022-10-27

## 2022-10-27 RX ORDER — ROSUVASTATIN CALCIUM 10 MG/1
TABLET, COATED ORAL
Qty: 90 TABLET | Refills: 3 | Status: SHIPPED | OUTPATIENT
Start: 2022-10-27

## 2022-10-27 NOTE — TELEPHONE ENCOUNTER
Medication refill requested: hydrocortisone (Proctosol HC) 2 5 % rectal cream  Last office visit: 11/18/2021  Next office visit: 11/21/22  Last refilled: 11/16/2021  Labs: No  Ordering Provider: [address] [place] [address] (select pharmacy send RX to):      [facility]  ([facility]) - Zeus Jimenez  [address] [address]  Phone: [phone] Fax: [phone]

## 2022-11-02 ENCOUNTER — APPOINTMENT (OUTPATIENT)
Dept: LAB | Facility: CLINIC | Age: 72
End: 2022-11-02

## 2022-11-02 DIAGNOSIS — E04.2 MULTIPLE THYROID NODULES: ICD-10-CM

## 2022-11-02 DIAGNOSIS — D69.6 THROMBOCYTOPENIA (HCC): ICD-10-CM

## 2022-11-02 DIAGNOSIS — E05.90 SUBCLINICAL HYPERTHYROIDISM: ICD-10-CM

## 2022-11-02 DIAGNOSIS — E78.2 MIXED HYPERLIPIDEMIA: ICD-10-CM

## 2022-11-02 LAB
ALBUMIN SERPL BCP-MCNC: 4 G/DL (ref 3.5–5)
ALP SERPL-CCNC: 42 U/L (ref 34–104)
ALT SERPL W P-5'-P-CCNC: 18 U/L (ref 7–52)
ANION GAP SERPL CALCULATED.3IONS-SCNC: 4 MMOL/L (ref 4–13)
AST SERPL W P-5'-P-CCNC: 19 U/L (ref 13–39)
BASOPHILS # BLD AUTO: 0.03 THOUSANDS/ÂΜL (ref 0–0.1)
BASOPHILS NFR BLD AUTO: 1 % (ref 0–1)
BILIRUB SERPL-MCNC: 0.67 MG/DL (ref 0.2–1)
BUN SERPL-MCNC: 17 MG/DL (ref 5–25)
CALCIUM SERPL-MCNC: 9.1 MG/DL (ref 8.4–10.2)
CHLORIDE SERPL-SCNC: 107 MMOL/L (ref 96–108)
CHOLEST SERPL-MCNC: 167 MG/DL
CO2 SERPL-SCNC: 30 MMOL/L (ref 21–32)
CREAT SERPL-MCNC: 0.81 MG/DL (ref 0.6–1.3)
EOSINOPHIL # BLD AUTO: 0.15 THOUSAND/ÂΜL (ref 0–0.61)
EOSINOPHIL NFR BLD AUTO: 3 % (ref 0–6)
ERYTHROCYTE [DISTWIDTH] IN BLOOD BY AUTOMATED COUNT: 13.6 % (ref 11.6–15.1)
GFR SERPL CREATININE-BSD FRML MDRD: 73 ML/MIN/1.73SQ M
GLUCOSE P FAST SERPL-MCNC: 89 MG/DL (ref 65–99)
HCT VFR BLD AUTO: 41.3 % (ref 34.8–46.1)
HDLC SERPL-MCNC: 55 MG/DL
HGB BLD-MCNC: 13 G/DL (ref 11.5–15.4)
IMM GRANULOCYTES # BLD AUTO: 0.03 THOUSAND/UL (ref 0–0.2)
IMM GRANULOCYTES NFR BLD AUTO: 1 % (ref 0–2)
LDLC SERPL CALC-MCNC: 96 MG/DL (ref 0–100)
LYMPHOCYTES # BLD AUTO: 2.12 THOUSANDS/ÂΜL (ref 0.6–4.47)
LYMPHOCYTES NFR BLD AUTO: 35 % (ref 14–44)
MCH RBC QN AUTO: 30 PG (ref 26.8–34.3)
MCHC RBC AUTO-ENTMCNC: 31.5 G/DL (ref 31.4–37.4)
MCV RBC AUTO: 95 FL (ref 82–98)
MONOCYTES # BLD AUTO: 0.58 THOUSAND/ÂΜL (ref 0.17–1.22)
MONOCYTES NFR BLD AUTO: 10 % (ref 4–12)
NEUTROPHILS # BLD AUTO: 3.13 THOUSANDS/ÂΜL (ref 1.85–7.62)
NEUTS SEG NFR BLD AUTO: 50 % (ref 43–75)
NONHDLC SERPL-MCNC: 112 MG/DL
NRBC BLD AUTO-RTO: 0 /100 WBCS
PLATELET # BLD AUTO: 195 THOUSANDS/UL (ref 149–390)
PMV BLD AUTO: 11.5 FL (ref 8.9–12.7)
POTASSIUM SERPL-SCNC: 4 MMOL/L (ref 3.5–5.3)
PROT SERPL-MCNC: 6.7 G/DL (ref 6.4–8.4)
RBC # BLD AUTO: 4.34 MILLION/UL (ref 3.81–5.12)
SODIUM SERPL-SCNC: 141 MMOL/L (ref 135–147)
TRIGL SERPL-MCNC: 82 MG/DL
TSH SERPL DL<=0.05 MIU/L-ACNC: 0.54 UIU/ML (ref 0.45–4.5)
WBC # BLD AUTO: 6.04 THOUSAND/UL (ref 4.31–10.16)

## 2022-11-08 ENCOUNTER — HOSPITAL ENCOUNTER (OUTPATIENT)
Dept: BONE DENSITY | Facility: MEDICAL CENTER | Age: 72
Discharge: HOME | End: 2022-11-08

## 2022-11-08 DIAGNOSIS — Z78.0 POST-MENOPAUSE: ICD-10-CM

## 2022-11-10 NOTE — RESULT ENCOUNTER NOTE
Please call the patient regarding abnormal result  DEXA scan obtained on November 9, 2022 shows some areas of low bone mass notable in spine and forearm  Please consistently supplement with 1200 mg of calcium and 2000 units of vitamin D3 daily  It appears that she is due for an appointment as she has not been seen in 2 years  She continuing to follow-up with us?

## 2022-11-10 NOTE — RESULT ENCOUNTER NOTE
Ly,  Your DEXA scan results show osteopenia, or low bone density  This does not require treatment with medication, but it is recommended that you keep up in adequate amount of calcium and vitamin-D  Your intake of calcium should be at least 1500 mg daily  This is the best to come from diet, or from supplements if you are unable to get enough from your diet  You should also have enough vitamin-D  You should be getting at least 1000 units of vitamin-D daily, unless we have already discussed you taking a greater amount due to a vitamin-D deficiency  We will likely repeat your DEXA scan test in about 2 years  Let me know if you have any questions    Take care,   Terrence Mckeon, DO

## 2022-11-19 NOTE — PROGRESS NOTES
1  Encounter for Medicare annual wellness exam  See other note  Up to date on HM  2  Mixed hyperlipidemia  Stable on Crestor 10 mg  LDL 96 but HDL 55    - Comprehensive metabolic panel; Future  - Lipid panel; Future    3  Anxiety  Some increase in insomnia  She has added melatonin to her ativan for sleep  Encouraged her to let us know if she needs anything else  4  Persistent insomnia  See above  5  Thrombocytopenia (HCC)  Stable   - CBC and differential; Future  - TSH, 3rd generation with Free T4 reflex; Future    6  Multiple thyroid nodules  Was following with yaz who has ordered a thyroid US  Was never done  Will order another and remind her to get this done  7  Subclinical hyperthyroidism  Recent labs stable TSH    - TSH, 3rd generation with Free T4 reflex; Future    8  Psoriasis  Small patches well controlled with Eleocon  Refill today   - mometasone (ELOCON) 0 1 % cream; Apply 1 application topically daily  Dispense: 45 g; Refill: 1    9  Grade I hemorrhoids  Pt requests refill    - hydrocortisone (Proctosol HC) 2 5 % rectal cream; Apply topically 2 (two) times a day  Dispense: 90 g; Refill: 2    10  Mixed stress and urge urinary incontinence  Consider pelvic PT - call if interested       11  Osteopenia, unspecified location  Now on calciumand vitamin D  Check dexa in 2 years  Weight bearing exercise encouraged  - Vitamin D 25 hydroxy; Future         No follow-ups on file      Subjective:   Aniceto Chou is a 71 y o  female here today for a follow-up on her current medical conditions:    Patient Active Problem List   Diagnosis   • Multiple thyroid nodules   • Alternating constipation and diarrhea   • Anxiety   • Subclinical hyperthyroidism   • Mixed hyperlipidemia   • Thrombocytopenia (HCC)   • Hemorrhoids   • Persistent insomnia   • Psoriasis   • Counseling regarding advanced directives       Patient Care Team:  Yonatan Cifuentes DO as PCP - General (Family Medicine)  Yonatan Cifuentes DO as PCP - [address],[address] [place] [address] (RTE)  Spencer Paula DO (Family Medicine)    Current Medications:  Current Outpatient Medications   Medication Sig Dispense Refill   • Ascorbic Acid (vitamin C) 1000 MG tablet Take 12,000 mg by mouth daily     • aspirin 81 mg chewable tablet Chew 81 mg daily     • cetirizine (ZyrTEC) 10 mg tablet Take 10 mg by mouth daily     • cholecalciferol (VITAMIN D3) 1,000 units tablet Take 2,000 Units by mouth daily     • hydrocortisone (Proctosol HC) 2 5 % rectal cream Apply topically 2 (two) times a day 90 g 2   • ibuprofen (MOTRIN) 200 mg tablet Take 200 mg by mouth every 6 (six) hours as needed for mild pain     • LORazepam (ATIVAN) 0 5 mg tablet Take 1 tablet (0 5 mg total) by mouth daily at bedtime 90 tablet 1   • mometasone (ELOCON) 0 1 % cream Apply 1 application topically daily 45 g 1   • Procto-Med HC 2 5 % rectal cream APPLY TO AFFECTED AREA(S)  TOPICALLY 3 TIMES DAILY 56 g 0   • rosuvastatin (CRESTOR) 10 MG tablet TAKE 1 TABLET BY MOUTH  DAILY 90 tablet 3     No current facility-administered medications for this visit  HPI:  Chief Complaint   Patient presents with   • Medicare Wellness Visit   •      Declined Mammo this year had it done last year      • Medication Refill     Pending      -- Above per clinical staff and reviewed  --    PHQ-2/9 Depression Screening    Little interest or pleasure in doing things: 1 - several days  Feeling down, depressed, or hopeless: 1 - several days  Trouble falling or staying asleep, or sleeping too much: 1 - several days  Feeling tired or having little energy: 1 - several days  Poor appetite or overeatin - not at all  Feeling bad about yourself - or that you are a failure or have let yourself or your family down: 0 - not at all  Trouble concentrating on things, such as reading the newspaper or watching television: 0 - not at all  Moving or speaking so slowly that other people could have noticed   Or the opposite - being so fidgety or restless that you have been moving around a lot more than usual: 0 - not at all  Thoughts that you would be better off dead, or of hurting yourself in some way: 0 - not at all  PHQ-2 Score: 2  PHQ-2 Interpretation: Negative depression screen  PHQ-9 Score: 4   PHQ-9 Interpretation: No or Minimal depression        ELVIN-7 Flowsheet Screening    Flowsheet Row Most Recent Value   Over the last 2 weeks, how often have you been bothered by any of the following problems? Feeling nervous, anxious, or on edge 2   Not being able to stop or control worrying 1   Worrying too much about different things 2   Trouble relaxing 0   Being so restless that it is hard to sit still 0   Becoming easily annoyed or irritable 1   Feeling afraid as if something awful might happen 0   ELVIN-7 Total Score 6           Dr Mikayla Garcia watching thyroid levels and US   Jan 2022 bw done   Nov 2021 bw TSH, free T4 normal  vit D stable 51, CMP normal, CBC normal    chol 184 to 197, TG 70 to 98, HDL 63 to 59,  to 118    due for mammo  dexa scheduled  flu shot due  Nov 2 022 labs TSH 0 543, CMP normal  chol 167, TG 82, HDL 55, LDL 96, H/H 13 0/41 3   Today:  Was advised to start taking 1250 calcium   Eczema starting left ear and right posterior scalp also,   Cream works well - needs refill   Takes gummy vitamins   Using collagen now   Also using Melatonin 5 mg now a month ago - and sleeping well with this   Leaking urine if she gets up in and does not go right to the BE   Does not do Kegel exercises   Walks and dances   Ativan at bedtime and helps her sleep     DNR  Has living will at home   Bindu Hines       Wants to do mammo every other year now       The following portions of the patient's history were reviewed and updated as appropriate: allergies, current medications, past family history, past medical history, past social history, past surgical history and problem list     Objective:  Vitals:  /72   Pulse 71   Temp 98 7 °F (37 1 °C) Resp 16   Ht 5' 3 5" (1 613 m)   Wt 67 5 kg (148 lb 14 4 oz)   SpO2 97%   BMI 25 96 kg/m²    Wt Readings from Last 3 Encounters:   11/21/22 67 5 kg (148 lb 14 4 oz)   11/18/21 70 3 kg (155 lb)   07/09/21 71 2 kg (157 lb)      BP Readings from Last 3 Encounters:   11/21/22 116/72   11/18/21 130/66   11/12/20 128/64        Review of Systems   She has no other concerns  No unexpected weight changes  No chest pain, SOB, or palpitations  No GERD  No changes in bowels or bladder  Sleeping well with meds  Denies mood changes  Physical Exam   Constitutional:  she appears well-developed and well-nourished  HENT: Head: Normocephalic  Neck: Neck supple  Cardiovascular: Normal rate, regular rhythm and normal heart sounds  Pulmonary/Chest: Effort normal and breath sounds normal  No wheezes, rales, or rhonchi  Abdominal: Soft  Bowel sounds are normal  There is no tenderness  No hepatosplenomegaly  Musculoskeletal: she exhibits no edema  Lymphadenopathy: she has no cervical adenopathy  Neurological: she is alert and oriented to person, place, and time  Skin: Skin is warm and dry  Few patches of erythematous thickened dry skin posterior ear  Scalp  Borderline hair  Psychiatric: she has a normal mood and affect  her behavior is normal  Thought content normal      BMI Counseling: Body mass index is 25 96 kg/m²  The BMI is above normal  Nutrition recommendations include decreasing portion sizes  Exercise recommendations include exercising 3-5 times per week  Rationale for BMI follow-up plan is due to patient being overweight or obese  Depression Screening and Follow-up Plan: Patient was screened for depression during today's encounter  They screened negative with a PHQ-2 score of 2

## 2022-11-21 ENCOUNTER — OFFICE VISIT (OUTPATIENT)
Dept: FAMILY MEDICINE CLINIC | Facility: CLINIC | Age: 72
End: 2022-11-21

## 2022-11-21 VITALS
TEMPERATURE: 98.7 F | RESPIRATION RATE: 16 BRPM | HEART RATE: 71 BPM | OXYGEN SATURATION: 97 % | BODY MASS INDEX: 25.42 KG/M2 | SYSTOLIC BLOOD PRESSURE: 116 MMHG | HEIGHT: 64 IN | WEIGHT: 148.9 LBS | DIASTOLIC BLOOD PRESSURE: 72 MMHG

## 2022-11-21 DIAGNOSIS — L40.9 PSORIASIS: ICD-10-CM

## 2022-11-21 DIAGNOSIS — F41.9 ANXIETY: ICD-10-CM

## 2022-11-21 DIAGNOSIS — D69.6 THROMBOCYTOPENIA (HCC): ICD-10-CM

## 2022-11-21 DIAGNOSIS — E78.2 MIXED HYPERLIPIDEMIA: ICD-10-CM

## 2022-11-21 DIAGNOSIS — Z00.00 ENCOUNTER FOR MEDICARE ANNUAL WELLNESS EXAM: Primary | ICD-10-CM

## 2022-11-21 DIAGNOSIS — E05.90 SUBCLINICAL HYPERTHYROIDISM: ICD-10-CM

## 2022-11-21 DIAGNOSIS — K64.0 GRADE I HEMORRHOIDS: ICD-10-CM

## 2022-11-21 DIAGNOSIS — N39.46 MIXED STRESS AND URGE URINARY INCONTINENCE: ICD-10-CM

## 2022-11-21 DIAGNOSIS — G47.00 PERSISTENT INSOMNIA: ICD-10-CM

## 2022-11-21 DIAGNOSIS — E04.2 MULTIPLE THYROID NODULES: ICD-10-CM

## 2022-11-21 DIAGNOSIS — M85.80 OSTEOPENIA, UNSPECIFIED LOCATION: ICD-10-CM

## 2022-11-21 RX ORDER — HYDROCORTISONE 25 MG/G
CREAM TOPICAL 2 TIMES DAILY
Qty: 90 G | Refills: 2 | Status: SHIPPED | OUTPATIENT
Start: 2022-11-21

## 2022-11-21 RX ORDER — MULTIVIT WITH MINERALS/LUTEIN
12000 TABLET ORAL DAILY
COMMUNITY

## 2022-11-21 RX ORDER — MOMETASONE FUROATE 1 MG/G
1 CREAM TOPICAL DAILY
Qty: 45 G | Refills: 1 | Status: SHIPPED | OUTPATIENT
Start: 2022-11-21

## 2022-11-21 NOTE — PROGRESS NOTES
Assessment and Plan:     Problem List Items Addressed This Visit        Unprioritized    Thrombocytopenia ([address] )    Relevant Orders    CBC and differential    TSH, 3rd generation with Free T4 reflex    Subclinical hyperthyroidism    Relevant Orders    TSH, 3rd generation with Free T4 reflex    TSH, 3rd generation    T3, free    T4, free    Psoriasis    Relevant Medications    mometasone (ELOCON) 0 1 % cream    Persistent insomnia    Multiple thyroid nodules    Relevant Orders    US thyroid    Mixed hyperlipidemia    Relevant Orders    Comprehensive metabolic panel    Lipid panel    Hemorrhoids    Relevant Medications    hydrocortisone (Proctosol HC) 2 5 % rectal cream    Anxiety   Other Visit Diagnoses     Encounter for Medicare annual wellness exam    -  Primary    Mixed stress and urge urinary incontinence        Osteopenia, unspecified location        Relevant Orders    Vitamin D 25 hydroxy           Preventive health issues were discussed with patient, and age appropriate screening tests were ordered as noted in patient's After Visit Summary  Personalized health advice and appropriate referrals for health education or preventive services given if needed, as noted in patient's After Visit Summary       History of Present Illness:     Patient presents for a Medicare Wellness Visit    HPI   Patient Care Team:  Chadd Nieto DO as PCP - General (Family Medicine)  Chadd Nieto DO as PCP - [address] [place] [address] ([facility])  Chadd Nieto DO (Family Medicine)     Review of Systems:     Review of Systems     Problem List:     Patient Active Problem List   Diagnosis   • Multiple thyroid nodules   • Alternating constipation and diarrhea   • Anxiety   • Subclinical hyperthyroidism   • Mixed hyperlipidemia   • Thrombocytopenia (HCC)   • Hemorrhoids   • Persistent insomnia   • Psoriasis   • Counseling regarding advanced directives      Past Medical and Surgical History:     Past Medical History:   Diagnosis Date   • Anxiety 5/6/2019   • Disease of thyroid gland    • High cholesterol    • Multiple thyroid nodules 5/6/2019   • Thrombocytopenia ([facility] [address] ) 5/6/2019     Past Surgical History:   Procedure Laterality Date   • APPENDECTOMY     • HERNIA REPAIR     • HYSTERECTOMY     • OOPHORECTOMY     • VAGINAL PROLAPSE REPAIR      Uterine prolase      Family History:     Family History   Problem Relation Age of Onset   • Heart disease Mother    • Osteoporosis Mother    • Heart attack Father    • Lung cancer Father    • Heart disease Father    • Colon cancer Paternal Aunt    • Colon cancer Paternal Uncle    • No Known Problems Sister    • No Known Problems Brother    • No Known Problems Daughter    • No Known Problems Sister    • No Known Problems Brother    • No Known Problems Brother       Social History:     Social History     Socioeconomic History   • Marital status: /Civil Union     Spouse name: None   • Number of children: 1   • Years of education: None   • Highest education level: None   Occupational History   • None   Tobacco Use   • Smoking status: Never   • Smokeless tobacco: Never   Vaping Use   • Vaping Use: Never used   Substance and Sexual Activity   • Alcohol use: Yes   • Drug use: Never   • Sexual activity: Not Currently   Other Topics Concern   • None   Social History Narrative   • None     Social Determinants of Health     Financial Resource Strain: Low Risk    • Difficulty of Paying Living Expenses: Not hard at all   Food Insecurity: Not on file   Transportation Needs: No Transportation Needs   • Lack of Transportation (Medical): No   • Lack of Transportation (Non-Medical):  No   Physical Activity: Not on file   Stress: Not on file   Social Connections: Not on file   Intimate Partner Violence: Not on file   Housing Stability: Not on file      Medications and Allergies:     Current Outpatient Medications   Medication Sig Dispense Refill   • Ascorbic Acid (vitamin C) 1000 MG tablet Take 12,000 mg by mouth daily     • aspirin 81 mg chewable tablet Chew 81 mg daily     • cetirizine (ZyrTEC) 10 mg tablet Take 10 mg by mouth daily     • cholecalciferol (VITAMIN D3) 1,000 units tablet Take 2,000 Units by mouth daily     • hydrocortisone (Proctosol HC) 2 5 % rectal cream Apply topically 2 (two) times a day 90 g 2   • ibuprofen (MOTRIN) 200 mg tablet Take 200 mg by mouth every 6 (six) hours as needed for mild pain     • LORazepam (ATIVAN) 0 5 mg tablet Take 1 tablet (0 5 mg total) by mouth daily at bedtime 90 tablet 1   • mometasone (ELOCON) 0 1 % cream Apply 1 application topically daily 45 g 1   • Procto-Med HC 2 5 % rectal cream APPLY TO AFFECTED AREA(S)  TOPICALLY 3 TIMES DAILY 56 g 0   • rosuvastatin (CRESTOR) 10 MG tablet TAKE 1 TABLET BY MOUTH  DAILY 90 tablet 3     No current facility-administered medications for this visit  Allergies   Allergen Reactions   • Penicillins Rash      Immunizations:     Immunization History   Administered Date(s) Administered   • COVID-19 MODERNA VACC 0 5 ML IM 01/26/2021, 02/22/2021, 10/28/2021, 05/14/2022   • COVID-19 Moderna Vac BIVALENT 18 Yr+ Im (BOOSTER ONLY) 10/26/2022   • INFLUENZA 10/13/2017, 09/24/2018, 09/15/2019, 09/06/2021, 09/10/2022   • Influenza Split High Dose Preservative Free IM 10/13/2017, 09/24/2018, 09/20/2019   • Influenza, high dose seasonal 0 7 mL 08/21/2020   • Pneumococcal Conjugate 13-Valent 10/13/2017   • Pneumococcal Polysaccharide PPV23 06/24/2019   • Tdap 10/24/2019      Health Maintenance:         Topic Date Due   • Breast Cancer Screening: Mammogram  07/09/2023   • DXA SCAN  11/08/2024   • Colorectal Cancer Screening  06/18/2029   • Hepatitis C Screening  Completed     There are no preventive care reminders to display for this patient  Medicare Screening Tests and Risk Assessments:     Amanda Flores is here for her Subsequent Wellness visit  Health Risk Assessment:   Patient rates overall health as good  Patient feels that their physical health rating is same  Patient is satisfied with their life  Eyesight was rated as same  Hearing was rated as same  Patient feels that their emotional and mental health rating is slightly worse  Patients states they are sometimes angry  Patient states they are never, rarely unusually tired/fatigued  Pain experienced in the last 7 days has been none  Patient states that she has experienced weight loss or gain in last 6 months  Depression Screening:   PHQ-2 Score: 2  PHQ-9 Score: 4      Fall Risk Screening: In the past year, patient has experienced: no history of falling in past year      Urinary Incontinence Screening:   Patient has not leaked urine accidently in the last six months  Home Safety:  Patient has trouble with stairs inside or outside of their home  Patient has working smoke alarms and has working carbon monoxide detector  Home safety hazards include: none  Nutrition:   Current diet is Regular  Medications:   Patient is currently taking over-the-counter supplements  OTC medications include: see medication list  Patient is able to manage medications  Activities of Daily Living (ADLs)/Instrumental Activities of Daily Living (IADLs):   Walk and transfer into and out of bed and chair?: Yes  Dress and groom yourself?: Yes    Bathe or shower yourself?: Yes    Feed yourself? Yes  Do your laundry/housekeeping?: Yes  Manage your money, pay your bills and track your expenses?: Yes  Make your own meals?: Yes    Do your own shopping?: Yes    Previous Hospitalizations:   Any hospitalizations or ED visits within the last 12 months?: No      Advance Care Planning:   Living will: Yes    Advanced directive: Yes    Advanced directive counseling given: Yes    End of Life Decisions reviewed with patient: Yes      Comments: DNR  Has living will at home   Bob Donohue       Cognitive Screening:   Provider or family/friend/caregiver concerned regarding cognition?: No    PREVENTIVE SCREENINGS      Cardiovascular Screening: General: Screening Not Indicated and History Lipid Disorder      Diabetes Screening:     General: Screening Current      Colorectal Cancer Screening:     General: Screening Current      Breast Cancer Screening:     General: Screening Current      Cervical Cancer Screening:    General: Screening Not Indicated      Osteoporosis Screening:    General: Screening Current      Lung Cancer Screening:     General: Screening Not Indicated      Hepatitis C Screening:    General: Screening Current    Screening, Brief Intervention, and Referral to Treatment (SBIRT)    Screening  Typical number of drinks in a day: 0  Typical number of drinks in a week: 0  Interpretation: Low risk drinking behavior  Single Item Drug Screening:  How often have you used an illegal drug (including marijuana) or a prescription medication for non-medical reasons in the past year? never    Single Item Drug Screen Score: 0  Interpretation: Negative screen for possible drug use disorder    No results found       Physical Exam:     /72   Pulse 71   Temp 98 7 °F (37 1 °C)   Resp 16   Ht 5' 3 5" (1 613 m)   Wt 67 5 kg (148 lb 14 4 oz)   SpO2 97%   BMI 25 96 kg/m²     Physical Exam     Merari Huerta DO

## 2022-11-21 NOTE — PATIENT INSTRUCTIONS
Consider pelvic PT     To schedule a mammogram:     Call Central Scheduling [phone]  or  Go to [facility] [place], then to Scheduling Ticket and Click on SCHEDULE NOW  For this option it will ask you the date of your last mammogram    Your last mammogram date was :  07/09/2021        Medicare Preventive Visit Patient Instructions  Thank you for completing your Welcome to Medicare Visit or Medicare Annual Wellness Visit today  Your next wellness visit will be due in one year (11/22/2023)  The screening/preventive services that you may require over the next 5-10 years are detailed below  Some tests may not apply to you based off risk factors and/or age  Screening tests ordered at today's visit but not completed yet may show as past due  Also, please note that scanned in results may not display below  Preventive Screenings:  Service Recommendations Previous Testing/Comments   Colorectal Cancer Screening  * Colonoscopy    * Fecal Occult Blood Test (FOBT)/Fecal Immunochemical Test (FIT)  * Fecal DNA/Cologuard Test  * Flexible Sigmoidoscopy Age: 39-70 years old   Colonoscopy: every 10 years (may be performed more frequently if at higher risk)  OR  FOBT/FIT: every 1 year  OR  Cologuard: every 3 years  OR  Sigmoidoscopy: every 5 years  Screening may be recommended earlier than age 39 if at higher risk for colorectal cancer  Also, an individualized decision between you and your healthcare provider will decide whether screening between the ages of 74-80 would be appropriate  Colonoscopy: 06/18/2019  FOBT/FIT: Not on file  Cologuard: Not on file  Sigmoidoscopy: Not on file    Screening Current     Breast Cancer Screening Age: 36 years old  Frequency: every 1-2 years  Not required if history of left and right mastectomy Mammogram: 07/09/2021    Screening Current   Cervical Cancer Screening Between the ages of 21-29, pap smear recommended once every 3 years     Between the ages of 33-67, can perform pap smear with HPV co-testing every 5 years  Recommendations may differ for women with a history of total hysterectomy, cervical cancer, or abnormal pap smears in past  Pap Smear: Not on file    Screening Not Indicated   Hepatitis C Screening Once for adults born between 1945 and 1965  More frequently in patients at high risk for Hepatitis C Hep C Antibody: 07/08/2019    Screening Current   Diabetes Screening 1-2 times per year if you're at risk for diabetes or have pre-diabetes Fasting glucose: 89 mg/dL (11/2/2022)  A1C: No results in last 5 years (No results in last 5 years)  Screening Current   Cholesterol Screening Once every 5 years if you don't have a lipid disorder  May order more often based on risk factors  Lipid panel: 11/02/2022    Screening Not Indicated  History Lipid Disorder     Other Preventive Screenings Covered by Medicare:  Abdominal Aortic Aneurysm (AAA) Screening: covered once if your at risk  You're considered to be at risk if you have a family history of AAA  Lung Cancer Screening: covers low dose CT scan once per year if you meet all of the following conditions: (1) Age 50-69; (2) No signs or symptoms of lung cancer; (3) Current smoker or have quit smoking within the last 15 years; (4) You have a tobacco smoking history of at least 20 pack years (packs per day multiplied by number of years you smoked); (5) You get a written order from a healthcare provider    Glaucoma Screening: covered annually if you're considered high risk: (1) You have diabetes OR (2) Family history of glaucoma OR (3)  aged 48 and older OR (3)  American aged 72 and older  Osteoporosis Screening: covered every 2 years if you meet one of the following conditions: (1) You're estrogen deficient and at risk for osteoporosis based off medical history and other findings; (2) Have a vertebral abnormality; (3) On glucocorticoid therapy for more than 3 months; (4) Have primary hyperparathyroidism; (5) On osteoporosis medications and need to assess response to drug therapy  Last bone density test (DXA Scan): 11/09/2022  HIV Screening: covered annually if you're between the age of 12-76  Also covered annually if you are younger than 13 and older than 72 with risk factors for HIV infection  For pregnant patients, it is covered up to 3 times per pregnancy  Immunizations:  Immunization Recommendations   Influenza Vaccine Annual influenza vaccination during flu season is recommended for all persons aged >= 6 months who do not have contraindications   Pneumococcal Vaccine   * Pneumococcal conjugate vaccine = PCV13 (Prevnar 13), PCV15 (Vaxneuvance), PCV20 (Prevnar 20)  * Pneumococcal polysaccharide vaccine = PPSV23 (Pneumovax) Adults 25-60 years old: 1-3 doses may be recommended based on certain risk factors  Adults 72 years old: 1-2 doses may be recommended based off what pneumonia vaccine you previously received   Hepatitis B Vaccine 3 dose series if at intermediate or high risk (ex: diabetes, end stage renal disease, liver disease)   Tetanus (Td) Vaccine - COST NOT COVERED BY MEDICARE PART B Following completion of primary series, a booster dose should be given every 10 years to maintain immunity against tetanus  Td may also be given as tetanus wound prophylaxis  Tdap Vaccine - COST NOT COVERED BY MEDICARE PART B Recommended at least once for all adults  For pregnant patients, recommended with each pregnancy  Shingles Vaccine (Shingrix) - COST NOT COVERED BY MEDICARE PART B  2 shot series recommended in those aged 48 and above     Health Maintenance Due:      Topic Date Due    Breast Cancer Screening: Mammogram  07/09/2022    DXA SCAN  11/08/2024    Colorectal Cancer Screening  06/18/2029    Hepatitis C Screening  Completed     Immunizations Due:      Topic Date Due    Hepatitis B Vaccine (1 of 3 - 3-dose series) Never done     Advance Directives   What are advance directives?   Advance directives are legal documents that state your wishes and plans for medical care  These plans are made ahead of time in case you lose your ability to make decisions for yourself  Advance directives can apply to any medical decision, such as the treatments you want, and if you want to donate organs  What are the types of advance directives? There are many types of advance directives, and each state has rules about how to use them  You may choose a combination of any of the following:  Living will: This is a written record of the treatment you want  You can also choose which treatments you do not want, which to limit, and which to stop at a certain time  This includes surgery, medicine, IV fluid, and tube feedings  Durable power of  for healthcare [facility]): This is a written record that states who you want to make healthcare choices for you when you are unable to make them for yourself  This person, called a proxy, is usually a family member or a friend  You may choose more than 1 proxy  Do not resuscitate (DNR) order:  A DNR order is used in case your heart stops beating or you stop breathing  It is a request not to have certain forms of treatment, such as CPR  A DNR order may be included in other types of advance directives  Medical directive: This covers the care that you want if you are in a coma, near death, or unable to make decisions for yourself  You can list the treatments you want for each condition  Treatment may include pain medicine, surgery, blood transfusions, dialysis, IV or tube feedings, and a ventilator (breathing machine)  Values history: This document has questions about your views, beliefs, and how you feel and think about life  This information can help others choose the care that you would choose  Why are advance directives important? An advance directive helps you control your care  Although spoken wishes may be used, it is better to have your wishes written down  Spoken wishes can be misunderstood, or not followed  Treatments may be given even if you do not want them  An advance directive may make it easier for your family to make difficult choices about your care  Urinary Incontinence   Urinary incontinence (UI)  is when you lose control of your bladder  UI develops because your bladder cannot store or empty urine properly  The 3 most common types of UI are stress incontinence, urge incontinence, or both  Medicines:   May be given to help strengthen your bladder control  Report any side effects of medication to your healthcare provider  Do pelvic muscle exercises often:  Your pelvic muscles help you stop urinating  Squeeze these muscles tight for 5 seconds, then relax for 5 seconds  Gradually work up to squeezing for 10 seconds  Do 3 sets of 15 repetitions a day, or as directed  This will help strengthen your pelvic muscles and improve bladder control  Train your bladder:  Go to the bathroom at set times, such as every 2 hours, even if you do not feel the urge to go  You can also try to hold your urine when you feel the urge to go  For example, hold your urine for 5 minutes when you feel the urge to go  As that becomes easier, hold your urine for 10 minutes  Self-care:   Keep a UI record  Write down how often you leak urine and how much you leak  Make a note of what you were doing when you leaked urine  Drink liquids as directed  You may need to limit the amount of liquid you drink to help control your urine leakage  Do not drink any liquid right before you go to bed  Limit or do not have drinks that contain caffeine or alcohol  Prevent constipation  Eat a variety of high-fiber foods  Good examples are high-fiber cereals, beans, vegetables, and whole-grain breads  Walking is the best way to trigger your intestines to have a bowel movement  Exercise regularly and maintain a healthy weight  Weight loss and exercise will decrease pressure on your bladder and help you control your leakage     Use a catheter as directed  to help empty your bladder  A catheter is a tiny, plastic tube that is put into your bladder to drain your urine  Go to behavior therapy as directed  Behavior therapy may be used to help you learn to control your urge to urinate  © Copyright [employer] 2018 Information is for End User's use only and may not be sold, redistributed or otherwise used for commercial purposes  All illustrations and images included in CareNotes® are the copyrighted property of [employer] M [employer]  or Traci Hobbs     Urinary Incontinence   AMBULATORY CARE:   Urinary incontinence (UI)  is when you lose control of your bladder  UI develops because your bladder cannot store or empty urine properly  The 3 most common types of UI are stress incontinence, urge incontinence, or both  Common symptoms include the following: You feel like your bladder does not empty completely when you urinate  You urinate often and need to urinate immediately  You leak urine when you sleep, or you wake up with the urge to urinate  You leak urine when you cough, sneeze, exercise, or laugh  Call your doctor if:   You have severe pain  You are confused or cannot think clearly  You have a fever  You see blood in your urine  You have pain when you urinate  You have new or worse pain, even after treatment  Your mouth feels dry or you have vision changes  Your urine is cloudy or smells bad  You have questions or concerns about your condition or care  Medicines:   Medicines  may be given to help strengthen your bladder control  Take your medicine as directed  Contact your healthcare provider if you think your medicine is not helping or if you have side effects  Tell him or her if you are allergic to any medicine  Keep a list of the medicines, vitamins, and herbs you take  Include the amounts, and when and why you take them  Bring the list or the pill bottles to follow-up visits   Carry your medicine list with you in case of an emergency  Do pelvic muscle exercises often:  Your pelvic muscles help you stop urinating  Squeeze these muscles tight for 5 seconds, then relax for 5 seconds  Gradually work up to squeezing for 10 seconds  Do 3 sets of 15 repetitions a day, or as directed  This will help strengthen your pelvic muscles and improve bladder control  Train your bladder:  Go to the bathroom at set times, such as every 2 hours, even if you do not feel the urge to go  You can also try to hold your urine when you feel the urge to go  For example, hold your urine for 5 minutes when you feel the urge to go  As that becomes easier, hold your urine for 10 minutes  Self-care:   Keep a UI record  Write down how often you leak urine and how much you leak  Make a note of what you were doing when you leaked urine  Drink liquids as directed  Ask your healthcare provider how much liquid to drink each day and which liquids are best for you  You may need to limit the amount of liquid you drink to help control your urine leakage  Do not drink any liquid right before you go to bed  Limit or do not have drinks that contain caffeine or alcohol  Prevent constipation  Eat a variety of high-fiber foods  Good examples are high-fiber cereals, beans, vegetables, and whole-grain breads  Prune juice may help make your bowel movement softer  Walking is the best way to trigger your intestines to have a bowel movement  Exercise regularly and maintain a healthy weight  Ask your healthcare provider how much you should weigh and about the best exercise plan for you  Weight loss and exercise will decrease pressure on your bladder and help you control your leakage  Ask him or her to help you create a weight loss plan if you are overweight  Use a catheter as directed  to help empty your bladder  A catheter is a tiny, plastic tube that is put into your bladder to drain your urine   Your healthcare provider may tell you to use a catheter to prevent your bladder from getting too full and leaking urine  Go to behavior therapy as directed  Behavior therapy may be used to help you learn to control your urge to urinate  Follow up with your doctor as directed:  Write down your questions so you remember to ask them during your visits  © Copyright [employer] 2022 Information is for End User's use only and may not be sold, redistributed or otherwise used for commercial purposes  All illustrations and images included in CareNotes® are the copyrighted property of A D A M , Inc  or [facility] Geraldine Hobbs   The above information is an  only  It is not intended as medical advice for individual conditions or treatments  Talk to your doctor, nurse or pharmacist before following any medical regimen to see if it is safe and effective for you  Kegel Exercises for Women   AMBULATORY CARE:   Kegel exercises  help strengthen your pelvic muscles  Pelvic muscles hold your pelvic organs, such as your bladder and uterus, in place  Kegel exercises help prevent or control problems with urine incontinence (leakage)  Incontinence may be caused by pregnancy, childbirth, or menopause  Contact your healthcare provider if:   You cannot feel your muscles tighten or relax  You continue to leak urine  You have questions or concerns about your condition or care  Use the correct muscles:  Pelvic muscles are the muscles you use to control urine flow  To target these muscles, stop and start the flow of urine several times  This will help you become familiar with how it feels to tighten and relax these muscles  How to do Kegel exercises:   Empty your bladder  You may lie down, stand up, or sit down to do these exercises  When you first try to do these exercises, it may be easier if you lie down  Tighten or squeeze your pelvic muscles slowly  It may feel like you are trying to hold back urine or gas  Hold this position for 3 seconds   Relax for 3 seconds  Repeat this cycle 10 times  Do 10 sets of Kegel exercises, at least 3 times a day  Do not hold your breath when you do Kegel exercises  Keep your stomach, back, and leg muscles relaxed  As your muscles get stronger, you will be able to hold the squeeze longer  Your healthcare provider may ask that you increase your pelvic muscle squeeze to 10 seconds  After you squeeze for 10 seconds, relax for 10 seconds  What else you should know:   Once you know how to do Kegel exercises, use different positions  You can do these exercises while you lie on the floor, sit at your desk or watch TV, and while you stand  You may notice improved bladder control within about 6 weeks  Tighten your pelvic muscles before you sneeze, cough, or lift to prevent urine leakage  Follow up with your doctor as directed:  Write down your questions so you remember to ask them during your visits  © [employer] 2022 Information is for End User's use only and may not be sold, redistributed or otherwise used for commercial purposes  All illustrations and images included in CareNotes® are the copyrighted property of A D A [employer]  or [facility] [place]note 2] Geraldine Hobbs   The above information is an  only  It is not intended as medical advice for individual conditions or treatments  Talk to your doctor, nurse or pharmacist before following any medical regimen to see if it is safe and effective for you

## 2022-11-25 ENCOUNTER — TELEPHONE (OUTPATIENT)
Dept: FAMILY MEDICINE CLINIC | Facility: CLINIC | Age: 72
End: 2022-11-25

## 2022-11-25 NOTE — TELEPHONE ENCOUNTER
Patient called the office states that she had a fall last night- patient states that she was carrying a box of food and hit her nose on the box and leaned on her knees  Patient declined any confusion, lack of coordination, Declined vomiting, Declined slurred speech  Declined any weakness, tingling, or numbness, Declined Weakness on one side of the body    Declined Sever Headache  Does have a Headache but not sever  Declined Blurred or double vision  Declined neck pain   No chest pain or SOB   No Blood or fluids draining from the ear or nose  No busing behind the ear, No back pain  Patient declined knee pain just a little busing  Triaged using Telephone Triage Protocols for Nurse, Fifth Edition, by Helen Piper  Triaged via the Falls category, page  241  Outcome: Home Care instructions and to call PCP if no improvement  Will send to PCP as an Deana Benz

## 2022-11-27 ENCOUNTER — TELEPHONE (OUTPATIENT)
Dept: FAMILY MEDICINE CLINIC | Facility: CLINIC | Age: 72
End: 2022-11-27

## 2022-11-27 PROBLEM — Z71.89 COUNSELING REGARDING ADVANCED DIRECTIVES: Status: ACTIVE | Noted: 2022-11-27

## 2022-11-27 NOTE — TELEPHONE ENCOUNTER
Please call pt - in reviewing her chart it looks like she is due for a thyroid ultrasound  I have ordered this  Please give her info to schedule  We just want to make sure those small nodules she has a few years ago have not grown

## 2023-03-01 DIAGNOSIS — F41.9 ANXIETY: ICD-10-CM

## 2023-03-02 DIAGNOSIS — F41.9 ANXIETY: ICD-10-CM

## 2023-03-02 RX ORDER — LORAZEPAM 0.5 MG/1
0.5 TABLET ORAL
Qty: 90 TABLET | Refills: 1 | Status: SHIPPED | OUTPATIENT
Start: 2023-03-02

## 2023-03-03 RX ORDER — LORAZEPAM 0.5 MG/1
TABLET ORAL
Qty: 90 TABLET | OUTPATIENT
Start: 2023-03-03

## 2023-03-14 ENCOUNTER — HOSPITAL ENCOUNTER (OUTPATIENT)
Dept: ULTRASOUND IMAGING | Facility: HOSPITAL | Age: 73
Discharge: HOME | End: 2023-03-14
Attending: FAMILY MEDICINE

## 2023-03-14 DIAGNOSIS — E04.2 MULTIPLE THYROID NODULES: ICD-10-CM

## 2023-03-23 DIAGNOSIS — E04.2 MULTIPLE THYROID NODULES: Primary | ICD-10-CM

## 2023-03-23 NOTE — RESULT ENCOUNTER NOTE
Please call the patient regarding result  Nodules are stable on ultrasound and do not meet criteria for biopsy at this time  We will continue to monitor

## 2023-03-24 ENCOUNTER — TELEPHONE (OUTPATIENT)
Dept: FAMILY MEDICINE CLINIC | Facility: CLINIC | Age: 73
End: 2023-03-24

## 2023-03-24 NOTE — TELEPHONE ENCOUNTER
Patient called, she has some questions regarding her ultrasound, she did read her my chart message but she still has some questions, patient would like to speak with Dr Bryson Hinson when she has time

## 2023-04-24 ENCOUNTER — VBI (OUTPATIENT)
Dept: ADMINISTRATIVE | Facility: OTHER | Age: 73
End: 2023-04-24

## 2023-05-04 ENCOUNTER — HOSPITAL ENCOUNTER (OUTPATIENT)
Dept: MAMMOGRAPHY | Facility: HOSPITAL | Age: 73
Discharge: HOME | End: 2023-05-04
Attending: FAMILY MEDICINE

## 2023-05-04 VITALS — WEIGHT: 146 LBS | BODY MASS INDEX: 24.92 KG/M2 | HEIGHT: 64 IN

## 2023-05-04 DIAGNOSIS — Z12.31 ENCOUNTER FOR SCREENING MAMMOGRAM FOR MALIGNANT NEOPLASM OF BREAST: ICD-10-CM

## 2023-05-05 NOTE — RESULT ENCOUNTER NOTE
Ly,  Your mammogram is normal  We recommend you schedule your next mammogram in one year     Have a good day,   Dr Beatris Ruvalcaba

## 2023-05-08 ENCOUNTER — TELEPHONE (OUTPATIENT)
Dept: FAMILY MEDICINE CLINIC | Facility: CLINIC | Age: 73
End: 2023-05-08

## 2023-05-08 NOTE — TELEPHONE ENCOUNTER
Patient is taking Vitamin C instead of Calcium, now she is taking the Calcium for the past two weeks and she is constipated and having some stomach problems, please call patient to discuss this

## 2023-05-09 NOTE — TELEPHONE ENCOUNTER
If she cannot tolerate oral calcium she can increase calcium rich foods  How much calcium should you have? Children 1 - 3 yrs: 500 mg - 1 milk serving per day   Children 4 - 8 yrs: 800 mg - 2 milk serving per day   Children 9 - 18 yrs: 1,300 mg - 3 milk serving per day   Adults 19 - 50: 1,000 mg   Adults over 50:  1, 200 mg      Some calcium rich foods:  ·         Dairy - low fat or fat free milk, soy milk, cheese, cottage cheese, yogurt, ice cream, frozen yogurt  ·         Green leafy vegetables like leslie greens kale or mustard greens, [place]  ·         Calcium fortified citrus juices  ·         Sardines and salmon with bones  ·         [place] beans, red beans, chickpeas  ·         Tofu  ·         Molasses  ·         Corn tortillas  ·         Seaweed, dry  ·         Almonds     ·         Taking a vitamin D supplement with calcium will help with absorption  ·         Having a lot of caffeine oriented and antiacids can decrease your absorption of calcium

## 2023-05-09 NOTE — TELEPHONE ENCOUNTER
Patient called back this morning, she would like to speak to someone today regarding this vitamin issue

## 2023-05-24 ENCOUNTER — TELEPHONE (OUTPATIENT)
Dept: FAMILY MEDICINE CLINIC | Facility: CLINIC | Age: 73
End: 2023-05-24

## 2023-05-24 NOTE — TELEPHONE ENCOUNTER
Phone call placed to pt. Pt states that she is scheduled to go over a few things. Ask pt what her concerns are and all are related to diet, vitamins, and constipation. Told pt that a dietician would be a better option as this is what they specialize in. Pt states she is not home at the moment and would like to "collect her thoughts on everything" and will call us back to let us know if she will be keeping the appt or not for tomorrow.

## 2023-05-24 NOTE — TELEPHONE ENCOUNTER
Please triage why patient scheduled appointment tomorrow - if it is to review how to get calcium into diet with constipations, etc I recommend referrral to dietician who is better suited to help her with this.   Please refer

## 2023-05-25 ENCOUNTER — OFFICE VISIT (OUTPATIENT)
Dept: FAMILY MEDICINE CLINIC | Facility: CLINIC | Age: 73
End: 2023-05-25

## 2023-05-25 VITALS
HEIGHT: 64 IN | TEMPERATURE: 97.7 F | DIASTOLIC BLOOD PRESSURE: 68 MMHG | HEART RATE: 68 BPM | WEIGHT: 148 LBS | SYSTOLIC BLOOD PRESSURE: 110 MMHG | BODY MASS INDEX: 25.27 KG/M2 | RESPIRATION RATE: 14 BRPM | OXYGEN SATURATION: 99 %

## 2023-05-25 DIAGNOSIS — M85.80 OSTEOPENIA, UNSPECIFIED LOCATION: Primary | ICD-10-CM

## 2023-05-25 NOTE — PATIENT INSTRUCTIONS
[facility], let's talk about FIBER!! I know fiber can cause gas, bloating, cramping but fiber is such an excellent way to keep your intestines healthy, maintain regular bowel movements and reduce risks of colon diseases such as colon cancer  Many patients with obesity, hypertension, and diabetes use fiber as a way to feel full, lose weight, and keep bowel movements regular  It is VERY important you learn 2 things form reading this: 1  What foods have fiber and 2  How much fiber is actually needed in a regular healthy lifestyle  Now 25 grams or more fiber a day is the recommend amount for adults however, getting to 25gm fiber or higher must be done SLOWLY!!! Adding 5gm a fiber to your diet daily for 1-2 weeks then increase another 5gm is the BEST way to do this  If you go from 0 gms fiber to 25gm fiber in the same day, you will feel the gas, bloating, cramps and be uncomfortable  For kids they should have their age + 11  For example a 11year old needs about 10 grams/day, a 8year old needs about 15 grams/day)     Examples foods with fiber:  Raspberries (8gm) Guava (9gm) Figs (9gm)   Barley (6gm) Kiwi (4gm) Refried beans (6gm)   Beets (6gm) Chickpeas (5gm) Grapefruit (4gm)   Blackberries (8gm) Pear (6gm) Avocado (8gm)   Edamame (8gm) Black Beans (15gm) Quinoa (5gm)   Almonds (4gm) Apples (4gm) Brussel Sprouts (4gm)  Spinach      Benefiber is a great powder that dissolves in warm water, coffee, hot tea which can be added teaspoon a ta time to help add fiber to your diet, especially if some of these foods don't agree with you  You could also try Metamucil  Miralax works as both a fiber supplement and a stool softener  This can safely be used every day to prevent and treat constipation        Again, SLOWLY!!!! Add fiber slowly as you will feel side effecst of too much fiber too fast       Other examples include:  BREADS: Made with 100% whole wheat flour; daljit, wheat or rye crackers; tortillas, bran muffins   CEREALS: Whole grain cereal with bran (Chex, Raisin Bran, Corn Bran), oatmeal, rolled oats, granola, wheat flakes, brown rice   NUTS: Any nuts   FRUITS: All fresh fruits along with edible skins, (bananas, citrus fruit, mangoes, pears, prunes, raisins, apples, pineapple, apricot, melon, jams and marmalades), fruit juices (especially prune juice)   VEGETABLES: All types, preferably raw or lightly cooked: especially, celery, eggplant, potatoes,spinach, broccoli, brussel sprouts, winter squash, carrots, cauliflower, soybeans, lentils, fresh and dried beans of all kinds    Magnesium is another supplement that can help with your bowels    Calcium rich foods:  ·         Dairy products - milk, yogurt, cottage cheese, ice cream, frozen yogurt  tofu, green leafy vegetables - leslie greens, kale, mustard greens, citrus fruit, sardines, red beans, chick peas, canned salmon with bones, molasses  Corn torillas, seaweed   ·         Taking vitamin D with calcium helps your body to absorb it   Having caffeine and antiacids will make it harder for your body to absorb calcium    How much calcium should you have? Children 1 - 3 yrs: 500 mg - 1 milk serving per day   Children 4 - 8 yrs: 800 mg - 2 milk serving per day   Children 9 - 18 yrs: 1,300 mg - 3 milk serving per day   Adults 19 - 50: 1,000 mg   Adults over 50:  1, 500 mg      Some calcium rich foods:  ·         Dairy - low fat or fat free milk, soy milk, cheese, cottage cheese, yogurt, ice cream, frozen yogurt  ·         Green leafy vegetables like leslie greens kale or mustard greens, Brackley  ·         Calcium fortified citrus juices  ·         Sardines and salmon with bones  ·         Logan beans, red beans, chickpeas  ·         Tofu  ·         Molasses  ·         Corn tortillas  ·         Seaweed, dry  ·         Almonds     ·         Taking a vitamin D supplement with calcium will help with absorption    ·         Having a lot of caffeine oriented and antiacids can decrease your absorption of calcium

## 2023-05-25 NOTE — PROGRESS NOTES
Rina Florence was seen today for follow-up  Diagnoses and all orders for this visit:    Osteopenia, unspecified location     reviewed recommendations for calcium and vitamin D is diet, but struggles with constipation  Reviewed diet, ways to increase calcium and fiber in diet to help with this   Use supplement if needed   Increase fiber in diet, use supplement such as miralax or benefiber if needed  Okay to use colace if needed  Reviewed foods high in calcium and fiber     5 minutes spent on chart prep, 20 minutes spent with patient counseling/educating on their diagnoses, tests completed and any new tests ordered, any referrals placed, treatment options, and documentation of above today  In prescribing new medications, or changing doses, we reviewed the risks and benefits and side effects of these medications along with other treatment options if appropriate  No follow-ups on file      Subjective:   Rina Florence is a 68 y o  female here today for a follow-up on her current medical conditions:    Patient Active Problem List   Diagnosis   • Multiple thyroid nodules   • Alternating constipation and diarrhea   • Anxiety   • Subclinical hyperthyroidism   • Mixed hyperlipidemia   • Thrombocytopenia (HCC)   • Hemorrhoids   • Persistent insomnia   • Psoriasis   • Counseling regarding advanced directives       Patient Care Team:  Barrett Deleon DO as PCP - General (Family Medicine)  Barrett Deleon DO as PCP - [address] [place] [address] (RTE)  Barrett Deleon DO (Family Medicine)    Current Medications:  Current Outpatient Medications   Medication Sig Dispense Refill   • aspirin 81 mg chewable tablet Chew 81 mg daily     • cetirizine (ZyrTEC) 10 mg tablet Take 10 mg by mouth daily     • cholecalciferol (VITAMIN D3) 1,000 units tablet Take 2,000 Units by mouth daily     • hydrocortisone (Proctosol HC) 2 5 % rectal cream Apply topically 2 (two) times a day 90 g 2   • ibuprofen (MOTRIN) 200 mg tablet Take 200 mg by mouth every 6 "(six) hours as needed for mild pain     • LORazepam (ATIVAN) 0 5 mg tablet Take 1 tablet (0 5 mg total) by mouth daily at bedtime 90 tablet 1   • mometasone (ELOCON) 0 1 % cream Apply 1 application topically daily 45 g 1   • Procto-Med HC 2 5 % rectal cream APPLY TO AFFECTED AREA(S)  TOPICALLY 3 TIMES DAILY 56 g 0   • rosuvastatin (CRESTOR) 10 MG tablet TAKE 1 TABLET BY MOUTH  DAILY 90 tablet 3   • Ascorbic Acid (vitamin C) 1000 MG tablet Take 12,000 mg by mouth daily (Patient not taking: Reported on 5/25/2023)       No current facility-administered medications for this visit  HPI:  Chief Complaint   Patient presents with   • Follow-up     Patient would like to just go over her over all health      -- Above per clinical staff and reviewed  --    PHQ-2/9 Depression Screening    Little interest or pleasure in doing things: 0 - not at all  Feeling down, depressed, or hopeless: 1 - several days  PHQ-2 Score: 1  PHQ-2 Interpretation: Negative depression screen       ? Dr Mabel Darnell watching thyroid levels and US   cmp, lipids, vit D? CBC, TSH    due for mammo     Nov 2022 labs TSH 0 543, CMP normal  chol 167, TG 82, HDL 55, LDL 96, H/H 13 0/41 3, plaletets 195   Advanced directive -  [facility] medical POA   Today:  Calcium caused constipation stopped it and took a month  Yesterday took a colace  If anything different gets constipation      The following portions of the patient's history were reviewed and updated as appropriate: allergies, current medications, past family history, past medical history, past social history, past surgical history and problem list     Objective:  Vitals:  /68   Pulse 68   Temp 97 7 °F (36 5 °C)   Resp 14   Ht 5' 3 5\" (1 613 m)   Wt 67 1 kg (148 lb)   SpO2 99%   BMI 25 81 kg/m²    Wt Readings from Last 3 Encounters:   05/25/23 67 1 kg (148 lb)   05/04/23 66 2 kg (146 lb)   11/21/22 67 5 kg (148 lb 14 4 oz)      BP Readings from Last 3 Encounters:   05/25/23 110/68 " 11/21/22 116/72   11/18/21 130/66        Review of Systems   She has no other concerns  No unexpected weight changes  No chest pain, SOB, or palpitations  No GERD  No changes in bowels or bladder  Sleeping well  No mood changes  Physical Exam   Constitutional:  she appears well-developed and well-nourished  HENT: Head: Normocephalic  Neck: Neck supple  Cardiovascular: Normal rate, regular rhythm and normal heart sounds  Pulmonary/Chest: Effort normal and breath sounds normal  No wheezes, rales, or rhonchi  Abdominal: Soft  Bowel sounds are normal  There is no tenderness  No hepatosplenomegaly  Musculoskeletal: she exhibits no edema  Lymphadenopathy: she has no cervical adenopathy  Neurological: she is alert and oriented to person, place, and time  Skin: Skin is warm and dry  Psychiatric: she has a normal mood and affect  her behavior is normal  Thought content normal      BMI Counseling: Body mass index is 25 81 kg/m²  The BMI is above normal  Nutrition recommendations include decreasing portion sizes  Exercise recommendations include exercising 3-5 times per week  Rationale for BMI follow-up plan is due to patient being overweight or obese  Depression Screening and Follow-up Plan: Patient was screened for depression during today's encounter  They screened negative with a PHQ-2 score of 1

## 2023-05-25 NOTE — TELEPHONE ENCOUNTER
Phone call placed to patient- states that she would like to go over her Vit, patient does not feels she does not need to see a dietician.  Patient would like to see Dr Yosvany Albrecht to go over all her questions

## 2023-07-12 DIAGNOSIS — K64.0 GRADE I HEMORRHOIDS: ICD-10-CM

## 2023-07-12 DIAGNOSIS — F41.9 ANXIETY: ICD-10-CM

## 2023-07-12 NOTE — TELEPHONE ENCOUNTER
She filled  a 90 day prescription of ativan on 5/2223 - she should not need this until next month.  Please inquire

## 2023-07-17 DIAGNOSIS — F41.9 ANXIETY: ICD-10-CM

## 2023-07-17 DIAGNOSIS — K64.0 GRADE I HEMORRHOIDS: ICD-10-CM

## 2023-07-17 RX ORDER — HYDROCORTISONE 25 MG/G
CREAM TOPICAL 2 TIMES DAILY
Qty: 90 G | Refills: 2 | Status: SHIPPED | OUTPATIENT
Start: 2023-07-17

## 2023-07-17 RX ORDER — LORAZEPAM 0.5 MG/1
0.5 TABLET ORAL
Qty: 90 TABLET | Refills: 0 | Status: SHIPPED | OUTPATIENT
Start: 2023-07-17

## 2023-07-18 RX ORDER — HYDROCORTISONE 25 MG/G
CREAM TOPICAL
Qty: 56 G | OUTPATIENT
Start: 2023-07-18

## 2023-07-18 RX ORDER — LORAZEPAM 0.5 MG/1
TABLET ORAL
Qty: 90 TABLET | Refills: 5 | OUTPATIENT
Start: 2023-07-18

## 2023-08-18 DIAGNOSIS — F41.9 ANXIETY: ICD-10-CM

## 2023-08-18 RX ORDER — LORAZEPAM 0.5 MG/1
0.5 TABLET ORAL
Qty: 90 TABLET | Refills: 0 | Status: SHIPPED | OUTPATIENT
Start: 2023-08-18

## 2023-08-18 NOTE — TELEPHONE ENCOUNTER
Called [facility] to see if pt received the 90-day supply that was sent to them on 7/17. [facility] rep states this medication has been out of stock and the refill was never sent to the pt. Pt was told to call around to local pharmacies and see who has the medication. Pt sent request today for rx to be sent to local [facility] on [address].  Please send rx to [facility].

## 2023-08-24 DIAGNOSIS — E78.2 MIXED HYPERLIPIDEMIA: ICD-10-CM

## 2023-08-24 RX ORDER — ROSUVASTATIN CALCIUM 10 MG/1
TABLET, COATED ORAL
Qty: 100 TABLET | Refills: 2 | Status: SHIPPED | OUTPATIENT
Start: 2023-08-24

## 2023-10-26 ENCOUNTER — APPOINTMENT (OUTPATIENT)
Dept: LAB | Facility: CLINIC | Age: 73
End: 2023-10-26
Payer: COMMERCIAL

## 2023-10-26 DIAGNOSIS — M85.80 OSTEOPENIA, UNSPECIFIED LOCATION: ICD-10-CM

## 2023-10-26 DIAGNOSIS — E78.2 MIXED HYPERLIPIDEMIA: ICD-10-CM

## 2023-10-26 DIAGNOSIS — D69.6 THROMBOCYTOPENIA (HCC): ICD-10-CM

## 2023-10-26 DIAGNOSIS — E05.90 SUBCLINICAL HYPERTHYROIDISM: ICD-10-CM

## 2023-10-26 LAB
25(OH)D3 SERPL-MCNC: 79.5 NG/ML (ref 30–100)
ALBUMIN SERPL BCP-MCNC: 4.1 G/DL (ref 3.5–5)
ALP SERPL-CCNC: 41 U/L (ref 34–104)
ALT SERPL W P-5'-P-CCNC: 21 U/L (ref 7–52)
ANION GAP SERPL CALCULATED.3IONS-SCNC: 4 MMOL/L
AST SERPL W P-5'-P-CCNC: 23 U/L (ref 13–39)
BASOPHILS # BLD AUTO: 0.03 THOUSANDS/ÂΜL (ref 0–0.1)
BASOPHILS NFR BLD AUTO: 1 % (ref 0–1)
BILIRUB SERPL-MCNC: 0.65 MG/DL (ref 0.2–1)
BUN SERPL-MCNC: 15 MG/DL (ref 5–25)
CALCIUM SERPL-MCNC: 8.8 MG/DL (ref 8.4–10.2)
CHLORIDE SERPL-SCNC: 107 MMOL/L (ref 96–108)
CHOLEST SERPL-MCNC: 171 MG/DL
CO2 SERPL-SCNC: 30 MMOL/L (ref 21–32)
CREAT SERPL-MCNC: 0.69 MG/DL (ref 0.6–1.3)
EOSINOPHIL # BLD AUTO: 0.11 THOUSAND/ÂΜL (ref 0–0.61)
EOSINOPHIL NFR BLD AUTO: 2 % (ref 0–6)
ERYTHROCYTE [DISTWIDTH] IN BLOOD BY AUTOMATED COUNT: 13.5 % (ref 11.6–15.1)
GFR SERPL CREATININE-BSD FRML MDRD: 87 ML/MIN/1.73SQ M
GLUCOSE P FAST SERPL-MCNC: 94 MG/DL (ref 65–99)
HCT VFR BLD AUTO: 42.4 % (ref 34.8–46.1)
HDLC SERPL-MCNC: 53 MG/DL
HGB BLD-MCNC: 13.5 G/DL (ref 11.5–15.4)
IMM GRANULOCYTES # BLD AUTO: 0.01 THOUSAND/UL (ref 0–0.2)
IMM GRANULOCYTES NFR BLD AUTO: 0 % (ref 0–2)
LDLC SERPL CALC-MCNC: 91 MG/DL (ref 0–100)
LYMPHOCYTES # BLD AUTO: 1.92 THOUSANDS/ÂΜL (ref 0.6–4.47)
LYMPHOCYTES NFR BLD AUTO: 35 % (ref 14–44)
MCH RBC QN AUTO: 30.3 PG (ref 26.8–34.3)
MCHC RBC AUTO-ENTMCNC: 31.8 G/DL (ref 31.4–37.4)
MCV RBC AUTO: 95 FL (ref 82–98)
MONOCYTES # BLD AUTO: 0.58 THOUSAND/ÂΜL (ref 0.17–1.22)
MONOCYTES NFR BLD AUTO: 10 % (ref 4–12)
NEUTROPHILS # BLD AUTO: 2.91 THOUSANDS/ÂΜL (ref 1.85–7.62)
NEUTS SEG NFR BLD AUTO: 52 % (ref 43–75)
NONHDLC SERPL-MCNC: 118 MG/DL
NRBC BLD AUTO-RTO: 0 /100 WBCS
PLATELET # BLD AUTO: 207 THOUSANDS/UL (ref 149–390)
PMV BLD AUTO: 11.7 FL (ref 8.9–12.7)
POTASSIUM SERPL-SCNC: 4.1 MMOL/L (ref 3.5–5.3)
PROT SERPL-MCNC: 6.7 G/DL (ref 6.4–8.4)
RBC # BLD AUTO: 4.45 MILLION/UL (ref 3.81–5.12)
SODIUM SERPL-SCNC: 141 MMOL/L (ref 135–147)
T3FREE SERPL-MCNC: 3.07 PG/ML (ref 2.5–3.9)
T4 FREE SERPL-MCNC: 0.75 NG/DL (ref 0.61–1.12)
T4 FREE SERPL-MCNC: 0.86 NG/DL (ref 0.61–1.12)
TRIGL SERPL-MCNC: 135 MG/DL
TSH SERPL DL<=0.05 MIU/L-ACNC: 0.42 UIU/ML (ref 0.45–4.5)
WBC # BLD AUTO: 5.56 THOUSAND/UL (ref 4.31–10.16)

## 2023-10-26 PROCEDURE — 84481 FREE ASSAY (FT-3): CPT

## 2023-10-26 PROCEDURE — 80061 LIPID PANEL: CPT

## 2023-10-26 PROCEDURE — 82306 VITAMIN D 25 HYDROXY: CPT

## 2023-10-26 PROCEDURE — 84439 ASSAY OF FREE THYROXINE: CPT

## 2023-10-26 PROCEDURE — 85025 COMPLETE CBC W/AUTO DIFF WBC: CPT

## 2023-10-26 PROCEDURE — 84443 ASSAY THYROID STIM HORMONE: CPT

## 2023-10-26 PROCEDURE — 80053 COMPREHEN METABOLIC PANEL: CPT

## 2023-10-26 PROCEDURE — 36415 COLL VENOUS BLD VENIPUNCTURE: CPT

## 2023-10-27 ENCOUNTER — TELEPHONE (OUTPATIENT)
Age: 73
End: 2023-10-27

## 2023-10-27 NOTE — TELEPHONE ENCOUNTER
PT saw on MY Chart that her thyroid level was low, and inquiring if this is anything to be alarmed of. Please f/u with PT to advise. She said a MY Chart message will be fine.     Thank You

## 2023-10-29 DIAGNOSIS — E05.90 HYPERTHYROIDISM: Primary | ICD-10-CM

## 2023-10-30 ENCOUNTER — TELEPHONE (OUTPATIENT)
Age: 73
End: 2023-10-30

## 2023-10-30 ENCOUNTER — APPOINTMENT (OUTPATIENT)
Dept: LAB | Facility: CLINIC | Age: 73
End: 2023-10-30
Payer: COMMERCIAL

## 2023-10-30 DIAGNOSIS — E05.90 HYPERTHYROIDISM: ICD-10-CM

## 2023-10-30 PROCEDURE — 83520 IMMUNOASSAY QUANT NOS NONAB: CPT

## 2023-10-30 PROCEDURE — 84445 ASSAY OF TSI GLOBULIN: CPT

## 2023-10-30 PROCEDURE — 36415 COLL VENOUS BLD VENIPUNCTURE: CPT

## 2023-10-30 NOTE — TELEPHONE ENCOUNTER
Phone call placed to patient- patient just needed some to go over results with her. Zackmaurice Horton,  Your results are back for the thyroid blood work. It does show that your TSH is low. This may mean hyperthyroidism. We need more information to see if this is the diagnosis. I am going to order more blood work, and update a thyroid ultrasound early. Are you having any symptoms like palpitations, tremor, weight loss, or sweating more than usual?  Let me know if you have any questions. Take care,  Greta Dick, DO      Patient states that about a month ago she was waking up "more nervous". patient declined Weight loss or Sweating. Declined current Palpitations and tremor. Patient is going to get her Blood work done and will call US Thyroid.

## 2023-10-30 NOTE — TELEPHONE ENCOUNTER
Patient requested to speak with PCP to talk in detail aboout blood work done on 10/26/23. Patient stated she is very anxious, has many questions and needs to hear from her doctor. Please have PCP contact patient and advise. Thank you for your help. It is greatly appreciated.

## 2023-10-31 ENCOUNTER — HOSPITAL ENCOUNTER (OUTPATIENT)
Dept: ULTRASOUND IMAGING | Facility: HOSPITAL | Age: 73
Discharge: HOME | End: 2023-10-31
Attending: FAMILY MEDICINE
Payer: COMMERCIAL

## 2023-10-31 DIAGNOSIS — E05.90 HYPERTHYROIDISM: ICD-10-CM

## 2023-10-31 PROCEDURE — 76536 US EXAM OF HEAD AND NECK: CPT

## 2023-11-01 LAB — TSI SER-ACNC: <0.1 IU/L (ref 0–0.55)

## 2023-11-02 ENCOUNTER — TELEPHONE (OUTPATIENT)
Age: 73
End: 2023-11-02

## 2023-11-02 LAB — TSH RECEP AB SER-ACNC: <1.1 IU/L (ref 0–1.75)

## 2023-11-02 NOTE — TELEPHONE ENCOUNTER
Patient called and would like a call back at [phone], would like to discuss labwork results from 10/30/2023.

## 2023-11-02 NOTE — TELEPHONE ENCOUNTER
Called patient. Per patient, she is looking for the results for Thyrotropin receptor antibody performed on 10/30/2023. Patient has been advised that Dr. Pete Miranda has been out of the office, but message will be sent to her to result this. Patient was also advised that US results has not been finalized. Appointment scheduled for 11/07/2023 @ 03:40PM with Dr. Pete Miranda to review labs/US if results are finalized. Patient will look for results of US/Thyrotropin receptor antibody blood work in portal.    Please result blood work for patient. Patient is very anxious about results.

## 2023-11-02 NOTE — TELEPHONE ENCOUNTER
See if you can help her. I am not in the office. Offer her a virtual appointment next week or an appointment with another provider to review if she does not want to wait until her appointment later this month.

## 2023-11-03 ENCOUNTER — RA CDI HCC (OUTPATIENT)
Dept: OTHER | Facility: HOSPITAL | Age: 73
End: 2023-11-03

## 2023-11-03 NOTE — PROGRESS NOTES
[address] [facility] coding opportunities       Chart reviewed, no opportunity found: CHART REVIEWED, NO OPPORTUNITY FOUND        Patients Insurance     Medicare Insurance: [facility]

## 2023-11-05 ENCOUNTER — TELEPHONE (OUTPATIENT)
Dept: FAMILY MEDICINE CLINIC | Facility: CLINIC | Age: 73
End: 2023-11-05

## 2023-11-06 ENCOUNTER — TELEPHONE (OUTPATIENT)
Age: 73
End: 2023-11-06

## 2023-11-06 NOTE — TELEPHONE ENCOUNTER
Kolton Alexandre from the [address] stated the ultra sound done on 10/31/23 is ready and has significant findings.

## 2023-11-07 ENCOUNTER — OFFICE VISIT (OUTPATIENT)
Dept: FAMILY MEDICINE CLINIC | Facility: CLINIC | Age: 73
End: 2023-11-07
Payer: COMMERCIAL

## 2023-11-07 VITALS
OXYGEN SATURATION: 98 % | SYSTOLIC BLOOD PRESSURE: 118 MMHG | HEIGHT: 64 IN | RESPIRATION RATE: 16 BRPM | TEMPERATURE: 97.4 F | HEART RATE: 74 BPM | WEIGHT: 150.13 LBS | DIASTOLIC BLOOD PRESSURE: 70 MMHG | BODY MASS INDEX: 25.63 KG/M2

## 2023-11-07 DIAGNOSIS — Z00.00 ENCOUNTER FOR MEDICARE ANNUAL WELLNESS EXAM: Primary | ICD-10-CM

## 2023-11-07 DIAGNOSIS — E05.90 SUBCLINICAL HYPERTHYROIDISM: ICD-10-CM

## 2023-11-07 DIAGNOSIS — E78.2 MIXED HYPERLIPIDEMIA: ICD-10-CM

## 2023-11-07 DIAGNOSIS — E04.2 MULTIPLE THYROID NODULES: ICD-10-CM

## 2023-11-07 DIAGNOSIS — G47.00 PERSISTENT INSOMNIA: ICD-10-CM

## 2023-11-07 DIAGNOSIS — D69.6 THROMBOCYTOPENIA (HCC): ICD-10-CM

## 2023-11-07 DIAGNOSIS — F41.9 ANXIETY: ICD-10-CM

## 2023-11-07 DIAGNOSIS — Z71.89 COUNSELING REGARDING ADVANCED DIRECTIVES: ICD-10-CM

## 2023-11-07 PROCEDURE — G0439 PPPS, SUBSEQ VISIT: HCPCS | Performed by: FAMILY MEDICINE

## 2023-11-07 PROCEDURE — 99214 OFFICE O/P EST MOD 30 MIN: CPT | Performed by: FAMILY MEDICINE

## 2023-11-07 NOTE — PROGRESS NOTES
1. Encounter for Medicare annual wellness exam    2. Subclinical hyperthyroidism  Assessment & Plan:  Reassurance given to pt. After further history blood work was done soon after COVID vaccine. Likely cause for abnormal labs. Repeat labs in 3 months. Orders:  -     TSH, 3rd generation; Future  -     T3, free; Future; Expected date: 02/07/2024  -     T4, free; Future    3. Multiple thyroid nodules  Assessment & Plan:  Thyroid nodule stable. Follow up in 2 years. Orders:  -     TSH, 3rd generation; Future  -     T3, free; Future; Expected date: 02/07/2024  -     T4, free; Future    4. Thrombocytopenia [facility])  Assessment & Plan:  Lab Results   Component Value Date    WBC 5.56 10/26/2023    HGB 13.5 10/26/2023    HCT 42.4 10/26/2023    MCV 95 10/26/2023     10/26/2023     Now normalized       5. Anxiety  Assessment & Plan:  Doing well with Ativan as needed. 6. Mixed hyperlipidemia  Assessment & Plan:  Lab Results   Component Value Date    CHOLESTEROL 171 10/26/2023    CHOLESTEROL 167 11/02/2022    CHOLESTEROL 176 01/13/2022     Lab Results   Component Value Date    HDL 53 10/26/2023    HDL 55 11/02/2022    HDL 70 01/13/2022     Lab Results   Component Value Date    TRIG 135 10/26/2023    TRIG 82 11/02/2022    TRIG 68 01/13/2022     Lab Results   Component Value Date    [address] 118 10/26/2023    [address] 112 11/02/2022    [address] 106 01/13/2022     Lab Results   Component Value Date    LDLCALC 91 10/26/2023     Doing well on Crestor 10 mg. No side effects. LDL < 91, high HDL. 7. Persistent insomnia  Assessment & Plan:  Uses ativan rarely as needed sleep. 8. Counseling regarding advanced directives  Assessment & Plan:  Has living will at home. DNR.  Marcia Hawkins is POA. Follow up one year for MWV.    Return in about 1 year (around 11/7/2024) for Annual physical.    Subjective:   Wolfgang Jackson is a 68 y.o. female here today for a follow-up on her current medical conditions:    Patient Active Problem List   Diagnosis   • Multiple thyroid nodules   • Alternating constipation and diarrhea   • Anxiety   • Subclinical hyperthyroidism   • Mixed hyperlipidemia   • Thrombocytopenia (HCC)   • Hemorrhoids   • Persistent insomnia   • Psoriasis   • Counseling regarding advanced directives       Patient Care Team:  Johan Hill DO as PCP - General (Family Medicine)  Johan Hill DO as PCP - [facility] (RTE)  Johan Hill DO (Family Medicine)    Current Medications:  Current Outpatient Medications   Medication Sig Dispense Refill   • Ascorbic Acid (vitamin C) 1000 MG tablet Take 12,000 mg by mouth daily     • aspirin 81 mg chewable tablet Chew 81 mg daily     • cetirizine (ZyrTEC) 10 mg tablet Take 10 mg by mouth daily     • cholecalciferol (VITAMIN D3) 1,000 units tablet Take 2,000 Units by mouth daily     • hydrocortisone (Proctosol HC) 2.5 % rectal cream Apply topically 2 (two) times a day 90 g 2   • ibuprofen (MOTRIN) 200 mg tablet Take 200 mg by mouth every 6 (six) hours as needed for mild pain     • LORazepam (ATIVAN) 0.5 mg tablet Take 1 tablet (0.5 mg total) by mouth daily at bedtime 90 tablet 0   • mometasone (ELOCON) 0.1 % cream Apply 1 application topically daily 45 g 1   • Procto-Med HC 2.5 % rectal cream APPLY TO AFFECTED AREA(S)  TOPICALLY 3 TIMES DAILY 56 g 0   • rosuvastatin (CRESTOR) 10 MG tablet TAKE 1 TABLET BY MOUTH DAILY 100 tablet 2     No current facility-administered medications for this visit. HPI:  Chief Complaint   Patient presents with   • Follow-up     Follow up an LAB/US      -- Above per clinical staff and reviewed. --    PHQ-2/9 Depression Screening         ? thyrotropin-receptor antibodies TRAb or (or thyroid-stimulating immunoglobulin [TSI]) negative/normal.   Dr. Beulah Ewing watching thyroid levels and US   cmp, lipids, vit D? CBC, TSH    due for mammo.    Nov 2022 labs TSH 0.543, CMP normal. chol 167, TG 82, H  DL 55, LDL 96, H/H 13.0/41.3, plaletets 195   Advanced directive -  Danny Lopez   Today:  1.5 months ago felt shaky in the morning   Anxiety is the same   No illness recently   Was better after the morning felt better     The following portions of the patient's history were reviewed and updated as appropriate: allergies, current medications, past family history, past medical history, past social history, past surgical history and problem list.    Objective:  Vitals:  /70   Pulse 74   Temp (!) 97.4 °F (36.3 °C)   Resp 16   Ht 5' 3.5" (1.613 m)   Wt 68.1 kg (150 lb 2 oz)   SpO2 98%   BMI 26.18 kg/m²    Wt Readings from Last 3 Encounters:   11/07/23 68.1 kg (150 lb 2 oz)   05/25/23 67.1 kg (148 lb)   05/04/23 66.2 kg (146 lb)      BP Readings from Last 3 Encounters:   11/07/23 118/70   05/25/23 110/68   11/21/22 116/72        Review of Systems   She has no other concerns. No unexpected weight changes. No chest pain, SOB, or palpitations. No GERD. No changes in bowels or bladder. Sleeping well. No mood changes. Physical Exam   Constitutional:  she appears well-developed and well-nourished. HENT: Head: Normocephalic. Neck: Neck supple. Cardiovascular: Normal rate, regular rhythm and normal heart sounds. Pulmonary/Chest: Effort normal and breath sounds normal. No wheezes, rales, or rhonchi. Abdominal: Soft. Bowel sounds are normal. There is no tenderness. No hepatosplenomegaly. Musculoskeletal: she exhibits no edema. Lymphadenopathy: she has no cervical adenopathy. Neurological: she is alert and oriented to person, place, and time. Skin: Skin is warm and dry. Psychiatric: she has a normal mood and affect.  her behavior is normal. Thought content normal.

## 2023-11-07 NOTE — PROGRESS NOTES
Assessment and Plan:     Problem List Items Addressed This Visit        Unprioritized    Thrombocytopenia ([address])     Lab Results   Component Value Date    WBC 5.56 10/26/2023    HGB 13.5 10/26/2023    HCT 42.4 10/26/2023    MCV 95 10/26/2023     10/26/2023     Now normalized          Subclinical hyperthyroidism     Reassurance given to pt. After further history blood work was done soon after COVID vaccine. Likely cause for abnormal labs. Repeat labs in 3 months. Relevant Orders    TSH, 3rd generation    T3, free    T4, free    Persistent insomnia     Uses ativan rarely as needed sleep. Multiple thyroid nodules     Thyroid nodule stable. Follow up in 2 years. Relevant Orders    TSH, 3rd generation    T3, free    T4, free    Mixed hyperlipidemia     Lab Results   Component Value Date    CHOLESTEROL 171 10/26/2023    CHOLESTEROL 167 11/02/2022    CHOLESTEROL 176 01/13/2022     Lab Results   Component Value Date    HDL 53 10/26/2023    HDL 55 11/02/2022    HDL 70 01/13/2022     Lab Results   Component Value Date    TRIG 135 10/26/2023    TRIG 82 11/02/2022    TRIG 68 01/13/2022     Lab Results   Component Value Date    [address] 118 10/26/2023    [address] 112 11/02/2022    [address] 106 01/13/2022     Lab Results   Component Value Date    LDLCALC 91 10/26/2023     Doing well on Crestor 10 mg. No side effects. LDL < 91, high HDL. Counseling regarding advanced directives     Has living will at home. DNR.  Salvatore Book is POA. Anxiety     Doing well with Ativan as needed. Other Visit Diagnoses     Encounter for Medicare annual wellness exam    -  Primary           Preventive health issues were discussed with patient, and age appropriate screening tests were ordered as noted in patient's After Visit Summary. Personalized health advice and appropriate referrals for health education or preventive services given if needed, as noted in patient's After Visit Summary. History of Present Illness:     Patient presents for a Medicare Wellness Visit    HPI   Patient Care Team:  Lisandra Lopez DO as PCP - General (Family Medicine)  Lisandra Lopez DO as PCP - [facility] LILA Mccullough (RTE)  Lisandra Lopez DO (Family Medicine)     Review of Systems:     Review of Systems     Problem List:     Patient Active Problem List   Diagnosis   • Multiple thyroid nodules   • Alternating constipation and diarrhea   • Anxiety   • Subclinical hyperthyroidism   • Mixed hyperlipidemia   • Thrombocytopenia (HCC)   • Hemorrhoids   • Persistent insomnia   • Psoriasis   • Counseling regarding advanced directives      Past Medical and Surgical History:     Past Medical History:   Diagnosis Date   • Anxiety 5/6/2019   • Disease of thyroid gland    • High cholesterol    • Multiple thyroid nodules 5/6/2019   • Thrombocytopenia ([address]) 5/6/2019     Past Surgical History:   Procedure Laterality Date   • APPENDECTOMY     • HERNIA REPAIR     • HYSTERECTOMY     • OOPHORECTOMY     • VAGINAL PROLAPSE REPAIR      Uterine prolase      Family History:     Family History   Problem Relation Age of Onset   • Heart disease Mother    • Osteoporosis Mother    • Heart attack Father    • Lung cancer Father    • Heart disease Father    • Colon cancer Paternal Aunt    • Colon cancer Paternal Uncle    • No Known Problems Sister    • No Known Problems Brother    • No Known Problems Daughter    • No Known Problems Sister    • No Known Problems Brother    • No Known Problems Brother       Social History:     Social History     Socioeconomic History   • Marital status: /Civil Union     Spouse name: None   • Number of children: 1   • Years of education: None   • Highest education level: None   Occupational History   • None   Tobacco Use   • Smoking status: Never   • Smokeless tobacco: Never   Vaping Use   • Vaping Use: Never used   Substance and Sexual Activity   • Alcohol use:  Yes   • Drug use: Never   • Sexual activity: Not Currently   Other Topics Concern   • None   Social History Narrative   • None     Social Determinants of Health     Financial Resource Strain: Low Risk  (11/7/2023)    Overall Financial Resource Strain (CARDIA)    • Difficulty of Paying Living Expenses: Not hard at all   Food Insecurity: Not on file   Transportation Needs: No Transportation Needs (11/7/2023)    PRAPARE - Transportation    • Lack of Transportation (Medical): No    • Lack of Transportation (Non-Medical): No   Physical Activity: Not on file   Stress: Not on file   Social Connections: Not on file   Intimate Partner Violence: Not on file   Housing Stability: Not on file      Medications and Allergies:     Current Outpatient Medications   Medication Sig Dispense Refill   • Ascorbic Acid (vitamin C) 1000 MG tablet Take 12,000 mg by mouth daily     • aspirin 81 mg chewable tablet Chew 81 mg daily     • cetirizine (ZyrTEC) 10 mg tablet Take 10 mg by mouth daily     • cholecalciferol (VITAMIN D3) 1,000 units tablet Take 2,000 Units by mouth daily     • hydrocortisone (Proctosol HC) 2.5 % rectal cream Apply topically 2 (two) times a day 90 g 2   • ibuprofen (MOTRIN) 200 mg tablet Take 200 mg by mouth every 6 (six) hours as needed for mild pain     • LORazepam (ATIVAN) 0.5 mg tablet Take 1 tablet (0.5 mg total) by mouth daily at bedtime 90 tablet 0   • mometasone (ELOCON) 0.1 % cream Apply 1 application topically daily 45 g 1   • Procto-Med HC 2.5 % rectal cream APPLY TO AFFECTED AREA(S)  TOPICALLY 3 TIMES DAILY 56 g 0   • rosuvastatin (CRESTOR) 10 MG tablet TAKE 1 TABLET BY MOUTH DAILY 100 tablet 2     No current facility-administered medications for this visit.      Allergies   Allergen Reactions   • Penicillins Rash      Immunizations:     Immunization History   Administered Date(s) Administered   • COVID-19 MODERNA VACC 0.5 ML IM 01/26/2021, 02/22/2021, 10/28/2021, 05/14/2022   • COVID-19 Moderna Vac BIVALENT 12 Yr+ IM 0.5 ML 10/26/2022   • COVID-19 Carlos Lav mRNA Vaccine 12 Yr+ 50 mcg/0.5 mL (Spikevax) 10/17/2023   • INFLUENZA 10/13/2017, 09/24/2018, 09/15/2019, 09/06/2021, 09/10/2022, 09/23/2023   • Influenza Split High Dose Preservative Free IM 10/13/2017, 09/24/2018, 09/20/2019   • Influenza, high dose seasonal 0.7 mL 08/21/2020   • Pneumococcal Conjugate 13-Valent 10/13/2017   • Pneumococcal Polysaccharide PPV23 06/24/2019   • Tdap 10/24/2019      Health Maintenance:         Topic Date Due   • DXA SCAN  11/08/2024   • Breast Cancer Screening: Mammogram  05/04/2025   • Colorectal Cancer Screening  06/18/2029   • Hepatitis C Screening  Completed     There are no preventive care reminders to display for this patient. Medicare Screening Tests and Risk Assessments:     Wolfgang Jackson is here for her Subsequent Wellness visit. Health Risk Assessment:   Patient rates overall health as excellent. Patient feels that their physical health rating is same. Patient is very satisfied with their life. Eyesight was rated as same. Hearing was rated as same. Patient feels that their emotional and mental health rating is same. Patients states they are sometimes angry. Patient states they are never, rarely unusually tired/fatigued. Pain experienced in the last 7 days has been none. Patient states that she has experienced no weight loss or gain in last 6 months. Fall Risk Screening: In the past year, patient has experienced: no history of falling in past year      Urinary Incontinence Screening:   Patient has not leaked urine accidently in the last six months. Home Safety:  Patient does not have trouble with stairs inside or outside of their home. Patient has working smoke alarms and has working carbon monoxide detector. Home safety hazards include: none. Nutrition:   Current diet is Regular. Medications:   Patient is currently taking over-the-counter supplements. OTC medications include: see medication list. Patient is not able to manage medications.      Activities of Daily Living (ADLs)/Instrumental Activities of Daily Living (IADLs):   Walk and transfer into and out of bed and chair?: Yes  Dress and groom yourself?: Yes    Bathe or shower yourself?: Yes    Feed yourself? Yes  Do your laundry/housekeeping?: Yes  Manage your money, pay your bills and track your expenses?: Yes  Make your own meals?: Yes    Do your own shopping?: Yes    Previous Hospitalizations:   Any hospitalizations or ED visits within the last 12 months?: No      Advance Care Planning:   Living will: Yes    Advanced directive: Yes    Advanced directive counseling given: Yes    End of Life Decisions reviewed with patient: Yes      Comments: Has living will at home. DNR.  Mitchell Ernst is POA. Cognitive Screening:   Provider or family/friend/caregiver concerned regarding cognition?: No    PREVENTIVE SCREENINGS      Cardiovascular Screening:    General: Screening Not Indicated and History Lipid Disorder      Diabetes Screening:     General: Screening Current      Colorectal Cancer Screening:     General: Screening Current      Breast Cancer Screening:     General: Screening Current      Cervical Cancer Screening:    General: Screening Not Indicated      Osteoporosis Screening:    General: Screening Current      Lung Cancer Screening:     General: Screening Not Indicated      Hepatitis C Screening:    General: Screening Current    Screening, Brief Intervention, and Referral to Treatment (SBIRT)    Screening  Typical number of drinks in a day: 0  Typical number of drinks in a week: 1  Interpretation: Low risk drinking behavior. Single Item Drug Screening:  How often have you used an illegal drug (including marijuana) or a prescription medication for non-medical reasons in the past year? never    Single Item Drug Screen Score: 0  Interpretation: Negative screen for possible drug use disorder    No results found.      Physical Exam:     /70   Pulse 74   Temp (!) 97.4 °F (36.3 °C)   Resp 16   Ht 5' 3.5" (1.613 m)   Wt 68.1 kg (150 lb 2 oz)   SpO2 98%   BMI 26.18 kg/m²     Physical Exam     Nehemias Quiñonez DO

## 2023-11-07 NOTE — PATIENT INSTRUCTIONS
Lower your vitamin D to 1000 iu   Medicare Preventive Visit Patient Instructions  Thank you for completing your Welcome to Medicare Visit or Medicare Annual Wellness Visit today. Your next wellness visit will be due in one year (11/7/2024). The screening/preventive services that you may require over the next 5-10 years are detailed below. Some tests may not apply to you based off risk factors and/or age. Screening tests ordered at today's visit but not completed yet may show as past due. Also, please note that scanned in results may not display below. Preventive Screenings:  Service Recommendations Previous Testing/Comments   Colorectal Cancer Screening  * Colonoscopy    * Fecal Occult Blood Test (FOBT)/Fecal Immunochemical Test (FIT)  * Fecal DNA/Cologuard Test  * Flexible Sigmoidoscopy Age: 43-73 years old   Colonoscopy: every 10 years (may be performed more frequently if at higher risk)  OR  FOBT/FIT: every 1 year  OR  Cologuard: every 3 years  OR  Sigmoidoscopy: every 5 years  Screening may be recommended earlier than age 39 if at higher risk for colorectal cancer. Also, an individualized decision between you and your healthcare provider will decide whether screening between the ages of 77-80 would be appropriate. Colonoscopy: 06/18/2019  FOBT/FIT: Not on file  Cologuard: Not on file  Sigmoidoscopy: Not on file    Screening Current     Breast Cancer Screening Age: 36 years old  Frequency: every 1-2 years  Not required if history of left and right mastectomy Mammogram: 05/04/2023    Screening Current   Cervical Cancer Screening Between the ages of 21-29, pap smear recommended once every 3 years. Between the ages of 32-69, can perform pap smear with HPV co-testing every 5 years.    Recommendations may differ for women with a history of total hysterectomy, cervical cancer, or abnormal pap smears in past. Pap Smear: Not on file    Screening Not Indicated   Hepatitis C Screening Once for adults born between 1945 and 1965  More frequently in patients at high risk for Hepatitis C Hep C Antibody: 07/08/2019    Screening Current   Diabetes Screening 1-2 times per year if you're at risk for diabetes or have pre-diabetes Fasting glucose: 94 mg/dL (10/26/2023)  A1C: No results in last 5 years (No results in last 5 years)  Screening Current   Cholesterol Screening Once every 5 years if you don't have a lipid disorder. May order more often based on risk factors. Lipid panel: 10/26/2023    Screening Not Indicated  History Lipid Disorder     Other Preventive Screenings Covered by Medicare:  Abdominal Aortic Aneurysm (AAA) Screening: covered once if your at risk. You're considered to be at risk if you have a family history of AAA. Lung Cancer Screening: covers low dose CT scan once per year if you meet all of the following conditions: (1) Age 48-67; (2) No signs or symptoms of lung cancer; (3) Current smoker or have quit smoking within the last 15 years; (4) You have a tobacco smoking history of at least 20 pack years (packs per day multiplied by number of years you smoked); (5) You get a written order from a healthcare provider. Glaucoma Screening: covered annually if you're considered high risk: (1) You have diabetes OR (2) Family history of glaucoma OR (3)  aged 48 and older OR (3)  American aged 72 and older  Osteoporosis Screening: covered every 2 years if you meet one of the following conditions: (1) You're estrogen deficient and at risk for osteoporosis based off medical history and other findings; (2) Have a vertebral abnormality; (3) On glucocorticoid therapy for more than 3 months; (4) Have primary hyperparathyroidism; (5) On osteoporosis medications and need to assess response to drug therapy. Last bone density test (DXA Scan): 11/09/2022. HIV Screening: covered annually if you're between the age of 14-79.  Also covered annually if you are younger than 13 and older than 72 with risk factors for HIV infection. For pregnant patients, it is covered up to 3 times per pregnancy. Immunizations:  Immunization Recommendations   Influenza Vaccine Annual influenza vaccination during flu season is recommended for all persons aged >= 6 months who do not have contraindications   Pneumococcal Vaccine   * Pneumococcal conjugate vaccine = PCV13 (Prevnar 13), PCV15 (Vaxneuvance), PCV20 (Prevnar 20)  * Pneumococcal polysaccharide vaccine = PPSV23 (Pneumovax) Adults 40-78 yo with certain risk factors or if 69+ yo  If never received any pneumonia vaccine: recommend Prevnar 20 (PCV20)  Give PCV20 if previously received 1 dose of PCV13 or PPSV23   Hepatitis B Vaccine 3 dose series if at intermediate or high risk (ex: diabetes, end stage renal disease, liver disease)   Respiratory syncytial virus (RSV) Vaccine - COVERED BY MEDICARE PART D  * RSVPreF3 (Arexvy) CDC recommends that adults 61years of age and older may receive a single dose of RSV vaccine using shared clinical decision-making (SCDM)   Tetanus (Td) Vaccine - COST NOT COVERED BY MEDICARE PART B Following completion of primary series, a booster dose should be given every 10 years to maintain immunity against tetanus. Td may also be given as tetanus wound prophylaxis. Tdap Vaccine - COST NOT COVERED BY MEDICARE PART B Recommended at least once for all adults. For pregnant patients, recommended with each pregnancy. Shingles Vaccine (Shingrix) - COST NOT COVERED BY MEDICARE PART B  2 shot series recommended in those 19 years and older who have or will have weakened immune systems or those 50 years and older     Health Maintenance Due:      Topic Date Due   • DXA SCAN  11/08/2024   • Breast Cancer Screening: Mammogram  05/04/2025   • Colorectal Cancer Screening  06/18/2029   • Hepatitis C Screening  Completed     Immunizations Due:  There are no preventive care reminders to display for this patient. Advance Directives   What are advance directives?   Advance directives are legal documents that state your wishes and plans for medical care. These plans are made ahead of time in case you lose your ability to make decisions for yourself. Advance directives can apply to any medical decision, such as the treatments you want, and if you want to donate organs. What are the types of advance directives? There are many types of advance directives, and each state has rules about how to use them. You may choose a combination of any of the following:  Living will: This is a written record of the treatment you want. You can also choose which treatments you do not want, which to limit, and which to stop at a certain time. This includes surgery, medicine, IV fluid, and tube feedings. Durable power of  for [facility]): This is a written record that states who you want to make healthcare choices for you when you are unable to make them for yourself. This person, called a proxy, is usually a family member or a friend. You may choose more than 1 proxy. Do not resuscitate (DNR) order:  A DNR order is used in case your heart stops beating or you stop breathing. It is a request not to have certain forms of treatment, such as CPR. A DNR order may be included in other types of advance directives. Medical directive: This covers the care that you want if you are in a coma, near death, or unable to make decisions for yourself. You can list the treatments you want for each condition. Treatment may include pain medicine, surgery, blood transfusions, dialysis, IV or tube feedings, and a ventilator (breathing machine). Values history: This document has questions about your views, beliefs, and how you feel and think about life. This information can help others choose the care that you would choose. Why are advance directives important? An advance directive helps you control your care. Although spoken wishes may be used, it is better to have your wishes written down.  Spoken wishes can be misunderstood, or not followed. Treatments may be given even if you do not want them. An advance directive may make it easier for your family to make difficult choices about your care. Urinary Incontinence   Urinary incontinence (UI)  is when you lose control of your bladder. UI develops because your bladder cannot store or empty urine properly. The 3 most common types of UI are stress incontinence, urge incontinence, or both. Medicines:   May be given to help strengthen your bladder control. Report any side effects of medication to your healthcare provider. Do pelvic muscle exercises often:  Your pelvic muscles help you stop urinating. Squeeze these muscles tight for 5 seconds, then relax for 5 seconds. Gradually work up to squeezing for 10 seconds. Do 3 sets of 15 repetitions a day, or as directed. This will help strengthen your pelvic muscles and improve bladder control. Train your bladder:  Go to the bathroom at set times, such as every 2 hours, even if you do not feel the urge to go. You can also try to hold your urine when you feel the urge to go. For example, hold your urine for 5 minutes when you feel the urge to go. As that becomes easier, hold your urine for 10 minutes. Self-care:   Keep a UI record. Write down how often you leak urine and how much you leak. Make a note of what you were doing when you leaked urine. Drink liquids as directed. You may need to limit the amount of liquid you drink to help control your urine leakage. Do not drink any liquid right before you go to bed. Limit or do not have drinks that contain caffeine or alcohol. Prevent constipation. Eat a variety of high-fiber foods. Good examples are high-fiber cereals, beans, vegetables, and whole-grain breads. Walking is the best way to trigger your intestines to have a bowel movement. Exercise regularly and maintain a healthy weight.   Weight loss and exercise will decrease pressure on your bladder and help you control your leakage. Use a catheter as directed  to help empty your bladder. A catheter is a tiny, plastic tube that is put into your bladder to drain your urine. Go to behavior therapy as directed. Behavior therapy may be used to help you learn to control your urge to urinate. Weight Management   Why it is important to manage your weight:  Being overweight increases your risk of health conditions such as heart disease, high blood pressure, type 2 diabetes, and certain types of cancer. It can also increase your risk for osteoarthritis, sleep apnea, and other respiratory problems. Aim for a slow, steady weight loss. Even a small amount of weight loss can lower your risk of health problems. How to lose weight safely:  A safe and healthy way to lose weight is to eat fewer calories and get regular exercise. You can lose up about 1 pound a week by decreasing the number of calories you eat by 500 calories each day. Healthy meal plan for weight management:  A healthy meal plan includes a variety of foods, contains fewer calories, and helps you stay healthy. A healthy meal plan includes the following:  Eat whole-grain foods more often. A healthy meal plan should contain fiber. Fiber is the part of grains, fruits, and vegetables that is not broken down by your body. Whole-grain foods are healthy and provide extra fiber in your diet. Some examples of whole-grain foods are whole-wheat breads and pastas, oatmeal, brown rice, and bulgur. Eat a variety of vegetables every day. Include dark, leafy greens such as spinach, kale, leslie greens, and mustard greens. Eat yellow and orange vegetables such as carrots, sweet potatoes, and winter squash. Eat a variety of fruits every day. Choose fresh or canned fruit (canned in its own juice or light syrup) instead of juice. Fruit juice has very little or no fiber. Eat low-fat dairy foods. Drink fat-free (skim) milk or 1% milk.  Eat fat-free yogurt and low-fat cottage cheese. Try low-fat cheeses such as mozzarella and other reduced-fat cheeses. Choose meat and other protein foods that are low in fat. Choose beans or other legumes such as split peas or lentils. Choose fish, skinless poultry (chicken or turkey), or lean cuts of red meat (beef or pork). Before you cook meat or poultry, cut off any visible fat. Use less fat and oil. Try baking foods instead of frying them. Add less fat, such as margarine, sour cream, regular salad dressing and mayonnaise to foods. Eat fewer high-fat foods. Some examples of high-fat foods include french fries, doughnuts, ice cream, and cakes. Eat fewer sweets. Limit foods and drinks that are high in sugar. This includes candy, cookies, regular soda, and sweetened drinks. Exercise:  Exercise at least 30 minutes per day on most days of the week. Some examples of exercise include walking, biking, dancing, and swimming. You can also fit in more physical activity by taking the stairs instead of the elevator or parking farther away from stores. Ask your healthcare provider about the best exercise plan for you. © Copyright [employer] 2018 Information is for End User's use only and may not be sold, redistributed or otherwise used for commercial purposes.  All illustrations and images included in CareNotes® are the copyrighted property of [employer] Inc[employer] or [address] [place] [address]

## 2023-11-08 NOTE — ASSESSMENT & PLAN NOTE
Lab Results   Component Value Date    WBC 5.56 10/26/2023    HGB 13.5 10/26/2023    HCT 42.4 10/26/2023    MCV 95 10/26/2023     10/26/2023     Now normalized

## 2023-11-08 NOTE — ASSESSMENT & PLAN NOTE
Reassurance given to pt. After further history blood work was done soon after COVID vaccine. Likely cause for abnormal labs. Repeat labs in 3 months.

## 2023-11-09 NOTE — ASSESSMENT & PLAN NOTE
Lab Results   Component Value Date    CHOLESTEROL 171 10/26/2023    CHOLESTEROL 167 11/02/2022    CHOLESTEROL 176 01/13/2022     Lab Results   Component Value Date    HDL 53 10/26/2023    HDL 55 11/02/2022    HDL 70 01/13/2022     Lab Results   Component Value Date    TRIG 135 10/26/2023    TRIG 82 11/02/2022    TRIG 68 01/13/2022     Lab Results   Component Value Date    NONHDLC 118 10/26/2023    [email or web address] 112 11/02/2022    [email or web address] 106 01/13/2022     Lab Results   Component Value Date    LDLCALC 91 10/26/2023     Doing well on Crestor 10 mg. No side effects. LDL < 91, high HDL.

## 2023-11-16 DIAGNOSIS — F41.9 ANXIETY: ICD-10-CM

## 2023-11-16 RX ORDER — LORAZEPAM 0.5 MG/1
0.5 TABLET ORAL
Qty: 90 TABLET | Refills: 0 | Status: SHIPPED | OUTPATIENT
Start: 2023-11-16

## 2023-11-16 NOTE — TELEPHONE ENCOUNTER
Reason for call:   [x] Refill   [] Prior Auth  [] Other:     Office:   [x] PCP/Provider -   [] Specialty/Provider -     Medication: Lorazepam    Dose/Frequency: 0.5    Quantity: 90    Pharmacy: [facility] pharmacy     Does the patient have enough for 3 days?    [x] Yes   [] No - Send as HP to POD

## 2024-01-06 DIAGNOSIS — K64.0 GRADE I HEMORRHOIDS: ICD-10-CM

## 2024-01-09 NOTE — TELEPHONE ENCOUNTER
Reason for call:     Patient calling to check on the status of the refill states she is almost out and would like med refilled ASAP.    [x] Refill   [] Prior Auth  [] Other:     Office:   [x] PCP/Provider - Dr Salvador  [] Specialty/Provider -     Medication:   Protosol rectal cream 2.5%

## 2024-01-10 ENCOUNTER — TELEPHONE (OUTPATIENT)
Dept: OTHER | Facility: HOSPITAL | Age: 74
End: 2024-01-10

## 2024-01-10 DIAGNOSIS — K64.0 GRADE I HEMORRHOIDS: ICD-10-CM

## 2024-01-10 RX ORDER — HYDROCORTISONE 25 MG/G
CREAM TOPICAL
Qty: 56 G | Refills: 0 | Status: CANCELLED | OUTPATIENT
Start: 2024-01-10

## 2024-01-10 NOTE — TELEPHONE ENCOUNTER
Per patient's chart and duplicate encounter, patient requested a refill on procto-med. Call placed to [facility], spoke to Vijay. Per Vijay, no additional information is needed. Vijay also verified that patient requested a refill of this.

## 2024-01-10 NOTE — TELEPHONE ENCOUNTER
It appears the sig has been changed on this medication, indicating it was not refilled but a new prescription was written. Please clarify how she is using it and then I can refill.

## 2024-01-18 ENCOUNTER — TELEPHONE (OUTPATIENT)
Dept: FAMILY MEDICINE CLINIC | Facility: CLINIC | Age: 74
End: 2024-01-18

## 2024-01-18 DIAGNOSIS — K64.0 GRADE I HEMORRHOIDS: Primary | ICD-10-CM

## 2024-01-18 NOTE — TELEPHONE ENCOUNTER
Pt called requesting to resend script of Proctozone-HC 2.5 % to a different pharmacy. [facility] are out of cream. Pt was advised to call nearest pharmacy to check availability and to call us back. Pt agreed.

## 2024-01-18 NOTE — TELEPHONE ENCOUNTER
Patient called the prescription for Proctozone HC  2.5%  rectal cream is unavailable. Called multiple pharmacies. One Pharmacist suggest  Calling in prescription for Hydrocortisone 2.5 % cream. Please send prescription to  [address]  [place]   [phone]    Any question please call patient at [phone] patient needs this medication as soon as possible

## 2024-01-19 NOTE — TELEPHONE ENCOUNTER
I can send this in, but this should not really be a long term medication. Instead I recommend she see CR to get definitive treatment for hemorrhoids . Please refer if she agrees.

## 2024-01-19 NOTE — TELEPHONE ENCOUNTER
Pt called back about this. Pt would like to go sometime this morning due to the snow. Can you please call pt and let her know what is going on?

## 2024-01-24 ENCOUNTER — RA CDI HCC (OUTPATIENT)
Dept: OTHER | Facility: HOSPITAL | Age: 74
End: 2024-01-24

## 2024-01-25 ENCOUNTER — TELEPHONE (OUTPATIENT)
Dept: FAMILY MEDICINE CLINIC | Facility: CLINIC | Age: 74
End: 2024-01-25

## 2024-01-26 ENCOUNTER — TELEPHONE (OUTPATIENT)
Dept: ADMINISTRATIVE | Facility: OTHER | Age: 74
End: 2024-01-26

## 2024-01-26 NOTE — TELEPHONE ENCOUNTER
01/26/24 5:20 PM    Patient contacted (left message) to bring Advance Directive, POLST, or Living Will document to next scheduled pcp visit.    Thank you.  Criss Garcia [facility]

## 2024-01-26 NOTE — TELEPHONE ENCOUNTER
Patient is going to call her insurance to see if she is able to go for the lab work prior to Monday's appointment.  She was planning on going for lab work the week of Feb 7th and then await lab results from there.  Patient is experiencing some anxiety issues that she would like to discuss with Dr. Salvador and took the soonest appointment which is for Monday 1/29.  We did look to reschedule this appointment until after she had her labs completed but it was at the end of February and patient did not want to wait to discuss the issues.    She would prefer to see Dr. Salvador Monday to discuss her anxiety issues and if insurance always her to go for labs prior she will.  If not she will still go the week of Feb 7th.

## 2024-01-28 ENCOUNTER — PATIENT MESSAGE (OUTPATIENT)
Dept: FAMILY MEDICINE CLINIC | Facility: CLINIC | Age: 74
End: 2024-01-28

## 2024-01-28 NOTE — TELEPHONE ENCOUNTER
Okay to discuss anxiety and can do a later visit for blood work if she wishes. Will send MyChart message also.

## 2024-01-29 ENCOUNTER — TELEMEDICINE (OUTPATIENT)
Dept: FAMILY MEDICINE CLINIC | Facility: CLINIC | Age: 74
End: 2024-01-29
Payer: COMMERCIAL

## 2024-01-29 ENCOUNTER — APPOINTMENT (OUTPATIENT)
Dept: LAB | Facility: CLINIC | Age: 74
End: 2024-01-29
Payer: COMMERCIAL

## 2024-01-29 DIAGNOSIS — E05.90 SUBCLINICAL HYPERTHYROIDISM: ICD-10-CM

## 2024-01-29 DIAGNOSIS — F41.9 ANXIETY: Primary | ICD-10-CM

## 2024-01-29 DIAGNOSIS — E04.2 MULTIPLE THYROID NODULES: ICD-10-CM

## 2024-01-29 DIAGNOSIS — K64.0 GRADE I HEMORRHOIDS: ICD-10-CM

## 2024-01-29 LAB
T3FREE SERPL-MCNC: 3.39 PG/ML (ref 2.5–3.9)
T4 FREE SERPL-MCNC: 0.93 NG/DL (ref 0.61–1.12)
TSH SERPL DL<=0.05 MIU/L-ACNC: 0.47 UIU/ML (ref 0.45–4.5)

## 2024-01-29 PROCEDURE — 99214 OFFICE O/P EST MOD 30 MIN: CPT | Performed by: FAMILY MEDICINE

## 2024-01-29 PROCEDURE — 84481 FREE ASSAY (FT-3): CPT

## 2024-01-29 PROCEDURE — 84439 ASSAY OF FREE THYROXINE: CPT

## 2024-01-29 PROCEDURE — 84443 ASSAY THYROID STIM HORMONE: CPT

## 2024-01-29 PROCEDURE — 36415 COLL VENOUS BLD VENIPUNCTURE: CPT

## 2024-01-29 RX ORDER — HYDROCORTISONE 25 MG/G
1 CREAM TOPICAL 2 TIMES DAILY
Qty: 120 G | Refills: 1 | Status: CANCELLED | OUTPATIENT
Start: 2024-01-29

## 2024-01-29 RX ORDER — HYDROCORTISONE 25 MG/G
CREAM TOPICAL 2 TIMES DAILY
Qty: 84 G | Refills: 2 | Status: SHIPPED | OUTPATIENT
Start: 2024-01-29

## 2024-01-29 RX ORDER — LORAZEPAM 0.5 MG/1
0.5 TABLET ORAL
Qty: 100 TABLET | Refills: 2 | Status: SHIPPED | OUTPATIENT
Start: 2024-01-29

## 2024-01-29 NOTE — ASSESSMENT & PLAN NOTE
Using ativan nightly to sleep. No side effects. Uses rarely during the day. Will refill with some extra for daytime use. Risks and benefits and side effects reviewed.   Controlled Substance Review    [place]MP or [place]  reviewed: No red flags were identified; safe to proceed with prescription..

## 2024-01-29 NOTE — PROGRESS NOTES
Virtual Regular Visit    Assessment/Plan:   1. Anxiety  Assessment & Plan:  Using ativan nightly to sleep. No side effects. Uses rarely during the day. Will refill with some extra for daytime use. Risks and benefits and side effects reviewed.   Controlled Substance Review    [place] or [place]  reviewed: No red flags were identified; safe to proceed with prescription..      Orders:  -     LORazepam (ATIVAN) 0.5 mg tablet; Take 1 tablet (0.5 mg total) by mouth daily at bedtime  -     hydrocortisone (ANUSOL-HC) 2.5 % rectal cream; Apply topically 2 (two) times a day    2. Grade I hemorrhoids  Assessment & Plan:  Ongoing. Saw CR and was advised it is okay to use HC cream long term for these.       3. Subclinical hyperthyroidism  Assessment & Plan:  Update blood work       4. Multiple thyroid nodules  Assessment & Plan:  Ultrasound due Nov 2025          5 minutes spent on chart prep, 30 minutes spent with patient counseling/educating on their diagnoses, tests completed and any new tests ordered, any referrals placed, treatment options, and documentation of above today.   In prescribing new medications, or changing doses, we reviewed the risks and benefits and side effects of these medications along with other treatment options if appropriate.     No follow-ups on file.    Reason for visit is   Chief Complaint   Patient presents with   • Anxiety     Wants to increase ativan   • Hemorrhoids     Requesting refill of Proctozone cream     Encounter provider Dahiana Salvador DO  Provider located at [facility]  [address]  [facility] [id number] [phone]    Recent Visits  Date Type Provider Dept   01/25/24 Telephone Dahiana Salvador DO [facility]   Showing recent visits within past 7 days and meeting all other requirements  Today's Visits  Date Type Provider Dept   01/29/24 Telemedicine Dahiana Salvador DO [facility]   Showing today's visits and meeting all other  requirements  Future Appointments  No visits were found meeting these conditions.  Showing future appointments within next 150 days and meeting all other requirements       The patient was identified by name and date of birth. Ly Richardson was informed that this is a telemedicine visit and that the visit is being conducted through the Epic Embedded platform. She agrees to proceed..  My office door was closed. No one else was in the room.  She acknowledged consent and understanding of privacy and security of the video platform. The patient has agreed to participate and understands they can discontinue the visit at any time.  - attempted video portion but this was not working for patient- conducted audio portion only.   Patient is currently located in the state [facility]  Patient is currently located in a state in which I am licensed    Patient is aware this is a billable service.     Subjective  Ly Richardson is a 73 y.o. female is being seen via Video Visit today due to the COVID-19 pandemic.  Chief Complaint   Patient presents with   • Anxiety     Wants to increase ativan   • Hemorrhoids     Requesting refill of Proctozone cream       Today's concerns are:    Stomach acts up   Usual problems with alternating constipation and diarrhea  For a month used metmucil gummies  Then colace 1 - 2 days, 1 per day   Then it was more and was going more often and that makes her nervous   Feeling fine for 2 weeks  Started drinking more water  Now going once a day and feeling more relaxed other   Prior to this she feels her anxiety   Ativan at night   In that period she took it during the day - not sure if it helped or not   Tender with a deep breath  More on left side, around belly button, moved - not pain   Not rumbling or cramping.   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  Vitals:     Wt Readings from Last 3 Encounters:   11/07/23 68.1 kg (150 lb 2 oz)   05/25/23 67.1 kg (148 lb)   05/04/23 66.2 kg (146  lb)     BP Readings from Last 3 Encounters:   23 118/70   23 110/68   22 116/72       PHQ-2/9 Depression Screening    Little interest or pleasure in doing things: 0 - not at all  Feeling down, depressed, or hopeless: 0 - not at all  Trouble falling or staying asleep, or sleeping too much: 0 - not at all  Feeling tired or having little energy: 0 - not at all  Poor appetite or overeatin - not at all  Feeling bad about yourself - or that you are a failure or have let yourself or your family down: 0 - not at all  Trouble concentrating on things, such as reading the newspaper or watching television: 2 - more than half the days  Moving or speaking so slowly that other people could have noticed. Or the opposite - being so fidgety or restless that you have been moving around a lot more than usual: 0 - not at all  Thoughts that you would be better off dead, or of hurting yourself in some way: 0 - not at all  PHQ-2 Score: 0  PHQ-2 Interpretation: Negative depression screen  PHQ-9 Score: 2  PHQ-9 Interpretation: No or Minimal depression       ELVIN-7 Flowsheet Screening    Flowsheet Row Most Recent Value   Over the last 2 weeks, how often have you been bothered by any of the following problems?    Feeling nervous, anxious, or on edge 1   Not being able to stop or control worrying 0   Worrying too much about different things 1   Trouble relaxing 0   Being so restless that it is hard to sit still 0   Becoming easily annoyed or irritable 1   Feeling afraid as if something awful might happen 0   ELVIN-7 Total Score 3         Past Medical History:   Diagnosis Date   • Anxiety 2019   • Disease of thyroid gland    • High cholesterol    • Multiple thyroid nodules 2019   • Thrombocytopenia (HCC) 2019     Past Surgical History:   Procedure Laterality Date   • APPENDECTOMY     • HERNIA REPAIR     • HYSTERECTOMY     • OOPHORECTOMY     • VAGINAL PROLAPSE REPAIR      Uterine prolase       Current  Medications:  Current Outpatient Medications   Medication Sig Dispense Refill   • Ascorbic Acid (vitamin C) 1000 MG tablet Take 12,000 mg by mouth daily     • aspirin 81 mg chewable tablet Chew 81 mg daily     • cetirizine (ZyrTEC) 10 mg tablet Take 10 mg by mouth daily     • cholecalciferol (VITAMIN D3) 1,000 units tablet Take 1,000 Units by mouth daily     • hydrocortisone (ANUSOL-HC) 2.5 % rectal cream Apply topically 2 (two) times a day 84 g 2   • hydrocortisone 2.5 % cream Apply topically 2 (two) times a day as needed (hemmorhoid) 28 g 1   • ibuprofen (MOTRIN) 200 mg tablet Take 200 mg by mouth every 6 (six) hours as needed for mild pain     • LORazepam (ATIVAN) 0.5 mg tablet Take 1 tablet (0.5 mg total) by mouth daily at bedtime 100 tablet 2   • rosuvastatin (CRESTOR) 10 MG tablet TAKE 1 TABLET BY MOUTH DAILY 100 tablet 2     No current facility-administered medications for this visit.        Allergies:  Allergies   Allergen Reactions   • Penicillins Rash       Review of Systems  all others negative - no chest pain, SOB, normal urine , + usual change in bowels. no GERD. sleeping well. mood with occasional anxiety.     Physical Exam   Video Exam Pt not examined in person - not able to be seen over epic virtual video visit   As a result of this visit, I have not referred the patient for further respiratory evaluation.    VIRTUAL VISIT DISCLAIMER    Ly Richardson acknowledges that she has consented to an online visit or consultation. She understands that the online visit is based solely on information provided by her, and that, in the absence of a face-to-face physical evaluation by the physician, the diagnosis she receives is both limited and provisional in terms of accuracy and completeness. This is not intended to replace a full medical face-to-face evaluation by the physician. Ly Richardson understands and accepts these terms.      Depression Screening and Follow-up Plan: Patient was screened for  depression during today's encounter. They screened negative with a PHQ-2 score of 0.

## 2024-01-29 NOTE — PATIENT INSTRUCTIONS
If not moving bowels in 2  - 3 days Miralax. Mix 3.4 grams in 8 oz of fluid daily for constipation. Hold for loose stools.

## 2024-02-08 DIAGNOSIS — F41.9 ANXIETY: ICD-10-CM

## 2024-02-08 NOTE — TELEPHONE ENCOUNTER
Not a duplicate. [facility] does not currently have medication in stock. Please send script to [facility] pharmacy.       Reason for call:   [x] Refill   [] Prior Auth  [] Other:     Office: kylah salmon   [x] PCP/Provider - steve   [] Specialty/Provider -     Medication: hydrocortisone rectal cream     Dose/Frequency: 2.5 % / Apply topically twice daily     Quantity: 84 g    Pharmacy: [facility] pharmacy     Does the patient have enough for 3 days?   [x] Yes   [] No - Send as HP to POD

## 2024-02-13 ENCOUNTER — TELEPHONE (OUTPATIENT)
Age: 74
End: 2024-02-13

## 2024-02-13 DIAGNOSIS — F41.9 ANXIETY: ICD-10-CM

## 2024-02-13 RX ORDER — HYDROCORTISONE 25 MG/G
CREAM TOPICAL 2 TIMES DAILY
Qty: 84 G | Refills: 5 | Status: SHIPPED | OUTPATIENT
Start: 2024-02-13

## 2024-02-13 NOTE — TELEPHONE ENCOUNTER
Please refill pt hydrocortisone cream 2.5% cream and please fill it at a 90 day supply (3 tubes) and send to [facility] in [facility]

## 2024-02-13 NOTE — TELEPHONE ENCOUNTER
Please review to see if the refill is appropriate. Prescribed on 02.13.2024 and sent to [facility], request for refill from [facility] today.  Refill must be reviewed and completed by the office or provider. The refill is unable to be approved / deny by the medication management team.

## 2024-02-14 RX ORDER — HYDROCORTISONE 25 MG/G
CREAM TOPICAL 2 TIMES DAILY
Qty: 84 G | Refills: 0 | OUTPATIENT
Start: 2024-02-14

## 2024-04-26 ENCOUNTER — APPOINTMENT (EMERGENCY)
Dept: CT IMAGING | Facility: HOSPITAL | Age: 74
End: 2024-04-26
Payer: COMMERCIAL

## 2024-04-26 ENCOUNTER — HOSPITAL ENCOUNTER (EMERGENCY)
Facility: HOSPITAL | Age: 74
Discharge: HOME | End: 2024-04-26
Attending: EMERGENCY MEDICINE
Payer: COMMERCIAL

## 2024-04-26 ENCOUNTER — NURSE TRIAGE (OUTPATIENT)
Age: 74
End: 2024-04-26

## 2024-04-26 VITALS
TEMPERATURE: 98.2 F | OXYGEN SATURATION: 96 % | SYSTOLIC BLOOD PRESSURE: 154 MMHG | HEART RATE: 62 BPM | RESPIRATION RATE: 18 BRPM | DIASTOLIC BLOOD PRESSURE: 69 MMHG

## 2024-04-26 DIAGNOSIS — S00.81XA ABRASION OF FOREHEAD, INITIAL ENCOUNTER: Primary | ICD-10-CM

## 2024-04-26 DIAGNOSIS — D49.7 THYROID INCIDENTALOMA: ICD-10-CM

## 2024-04-26 DIAGNOSIS — S00.83XA CONTUSION OF FOREHEAD, INITIAL ENCOUNTER: ICD-10-CM

## 2024-04-26 DIAGNOSIS — S09.90XA INJURY OF HEAD, INITIAL ENCOUNTER: ICD-10-CM
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
## 2024-04-26 PROCEDURE — 72125 CT NECK SPINE W/O DYE: CPT

## 2024-04-26 PROCEDURE — 99284 EMERGENCY DEPT VISIT MOD MDM: CPT | Performed by: EMERGENCY MEDICINE

## 2024-04-26 PROCEDURE — 99284 EMERGENCY DEPT VISIT MOD MDM: CPT

## 2024-04-26 PROCEDURE — 70450 CT HEAD/BRAIN W/O DYE: CPT

## 2024-04-26 PROCEDURE — 12011 RPR F/E/E/N/L/M 2.5 CM/<: CPT | Performed by: EMERGENCY MEDICINE

## 2024-04-26 RX ORDER — GINSENG 100 MG
1 CAPSULE ORAL ONCE
Status: DISCONTINUED | OUTPATIENT
Start: 2024-04-26 | End: 2024-04-26 | Stop reason: HOSPADM

## 2024-04-26 NOTE — ED ATTENDING ATTESTATION
4/26/2024  I, Aaron Edwards MD, saw and evaluated the patient. I have discussed the patient with the resident/non-physician practitioner and agree with the resident's/non-physician practitioner's findings, Plan of Care, and MDM as documented in the resident's/non-physician practitioner's note, except where noted. All available labs and Radiology studies were reviewed.  I was present for key portions of any procedure(s) performed by the resident/non-physician practitioner and I was immediately available to provide assistance.       At this point I agree with the current assessment done in the Emergency Department.  I have conducted an independent evaluation of this patient a history and physical is as follows:    History    Patient is a 74-year-old female, with a history significant for thrombocytopenia and daily aspirin use per my review of medical record, who presents to the ED today as a level C trauma due to fall with head strike while taking aspirin.  Patient states she was in her usual state of health earlier today until, while walking, she tripped on curb causing her to fall forward and strike her head.  Patient states she was wearing sunglasses but they did not break.  She has a small laceration on her left eyebrow secondary to the glasses pushing into her head.  Patient attempted washing out her wound and became concerned when it was bleeding prompting her to seek evaluation in the ED.  Patient also attempted placing Neosporin on her wound to remit her symptoms.  Patient reports mild headache, character is a soreness, that is new since the trauma.  Patient otherwise denies pain anywhere, dysphagia, vision change, weakness, numbness, chest pain, dyspnea, urinary symptoms, abdominal pain.    Patient's last tetanus update was in October 2019    Patient is without other concerns at this time.     ROS  Patient denies: Fever; dysphagia; vision change; chest pain; dyspnea; abdominal pain; polyuria;  dysuria; rash; weakness; numbness; difficulty walking; confusion    Physical Exam    Airway intact; breathing intact/bilateral breath sounds; 2+ radial pulses; GCS 15; exposure completed    GENERAL APPEARANCE: NAD  NEURO: Patient is speaking clearly in complete sentences.  Patient is answering appropriately and able follow commands.  Patient is moving all four extremities spontaneously.  No facial droop.  Tongue midline.  Patient able to ambulate unassisted.  HEENT: PERRL, Moist mucous membranes, external ears normal, nose normal  Neck: No cervical adenopathy.  Patient arrived in c-collar  CV: RRR. No murmurs, rubs, gallops  LUNGS: Clear to auscultation: No wheezes, stridor, rhonchi, rales  GI: Abdomen non-distended. Soft. Non-tender and without rebound or guarding   : Chaperone present, no injury or erythema in the groin  MSK: No tenderness to palpation of the bilateral shoulders, elbows, arms, thighs, knees, legs. No chest wall or pelvic tenderness to palpation   No midline C, T, L spine tenderness to palpation    No anatomic snuffbox tenderness to palpation bilaterally.  Skin: Warm and dry.  Small, superficial, nonbleeding laceration on the lateral aspect of the left eyebrow.  Capillary refill: <2 seconds    Patient is currently afebrile and hemodynamically stable    Assessment/Plan/MDM  Fall, head strike while on aspirin, small nonbleeding laceration  -Concern for mechanical fall, laceration.  I also considered intracranial hemorrhage, skull fracture, concussion, cervical vertebral fracture as patient is greater than 65.  No reason to suspect syncope at this time based on history physical exam.  -Will investigate with CT head and C-spine  -Will manage with antibiotic ointment, further based upon workup  -Patient advised that the laceration will scar regardless of what is done today.  She is advised minimizing sun exposure as a way to mitigate this.    ED Course         Critical Care Time  Procedures

## 2024-04-26 NOTE — TELEPHONE ENCOUNTER
"Patient called regarding post fall today at 11 am while walking with friend. Tripped and landed on her hands and forehead. Was wearing sunglasses and caused a laceration to R eyebrow. Patient described it was gushing initially and has dissipated however opens easily. Swelling is present per patient. Patient is on baby aspirin. She stated she treated with ice every 20 mins and cleaned the wound. Patient denies headaches or visual changes. Was alert and oriented. Protocol advised to go to ER now and be evaluated. Patient agreed and placed on [place] ER board. Patient advised to follow up with PCP post ER visit.      Reason for Disposition   Injury (or injuries) that need emergency care    Answer Assessment - Initial Assessment Questions  1. MECHANISM: \"How did the fall happen?\"      Walking on street tripped possibly on side walk/concrete.    3. ONSET: \"When did the fall happen?\" (e.g., minutes, hours, or days ago)      Today at 11AM   4. LOCATION: \"What part of the body hit the ground?\" (e.g., back, buttocks, head, hips, knees, hands, head, stomach)      Forehead and B/L hands was wearing sunglasses   5. INJURY: \"Did you hurt (injure) yourself when you fell?\" If Yes, ask: \"What did you injure? Tell me more about this?\" (e.g., body area; type of injury; pain severity)\"      Laceration to R eyebrow, continuously bleeding, swelling and possible hematoma.   6. PAIN: \"Is there any pain?\" If Yes, ask: \"How bad is the pain?\" (e.g., Scale 1-10; or mild,   moderate, severe)    - NONE (0): no pain    - MILD (1-3): doesn't interfere with normal activities     - MODERATE (4-7): interferes with normal activities or awakens from sleep     - SEVERE (8-10): excruciating pain, unable to do any normal activities       0/10  7. SIZE: For cuts, bruises, or swelling, ask: \"How large is it?\" (e.g., inches or centimeters)       Smaller than a dime, bleeding stopped  8. PREGNANCY: \"Is there any chance you are pregnant?\" \"When was your last " "menstrual period?\"      Post menopausal   9. OTHER SYMPTOMS: \"Do you have any other symptoms?\" (e.g., dizziness, fever, weakness; new onset or worsening).       R to L neck movements are tender   10. CAUSE: \"What do you think caused the fall (or falling)?\" (e.g., tripped, dizzy spell)        Denies    Protocols used: Falls and Falling-ADULT-[place]    "

## 2024-04-26 NOTE — ED PROVIDER NOTES
Emergency Department Trauma Note  Ly Richardson 74 y.o. female MRN: [id number]  Unit/Bed#: ED-07/ED-07 Encounter: [id number]      Trauma Alert: Trauma Acuity: C  Model of Arrival:   via    Trauma Team: Current Providers  Attending Provider: Aaron Edwards MD  Registered Nurse: Beth Cobos RN  Resident: Laurence Cross DO  Consultants:     None      History of Present Illness     Chief Complaint:   Chief Complaint   Patient presents with    Fall     Pt was walking and tripped on curb. +head strike, - LOC, - thinner, +ASA. Laceration to left eyebrow     HPI:  Ly Richardson is a 74 y.o. female who presents with abrasion above left eyebrow s/p fall..  Mechanism:trip and fall on sidewalk  Ly is a 74-year-old female on aspirin who presents with small abrasion above left eyebrow status post fall.  States she was walking on the sidewalk, tripped over a raised portion of the sidewalk, fell forward landing on her bilateral outstretched hands, however did strike the left side of her forehead on the ground as well.  Did not lose consciousness, was mildly nauseated after the incident but has not vomited, denies neck pain, back pain, or any other injuries.  Denies any symptoms prior to the fall, was able to walk without assistance afterwards.  Denies pain at this time, presented specifically to see if she needed stitches in her forehead.      Review of Systems   Constitutional: Negative.    HENT: Negative.     Eyes: Negative.  Negative for photophobia and visual disturbance.   Respiratory: Negative.  Negative for shortness of breath.    Cardiovascular: Negative.  Negative for chest pain, palpitations and leg swelling.   Gastrointestinal:  Negative for abdominal distention, abdominal pain, constipation, diarrhea and vomiting.        Nausea now resolved.   Endocrine: Negative.    Genitourinary: Negative.    Musculoskeletal: Negative.  Negative for arthralgias, back pain, gait problem, joint swelling,  myalgias, neck pain and neck stiffness.   Skin:  Positive for wound. Negative for pallor.   Allergic/Immunologic: Negative.    Neurological: Negative.  Negative for dizziness, syncope, facial asymmetry, speech difficulty, weakness, light-headedness, numbness and headaches.   Hematological: Negative.  Does not bruise/bleed easily.   Psychiatric/Behavioral: Negative.  Negative for confusion.    All other systems reviewed and are negative.      Historical Information     Immunizations:   Immunization History   Administered Date(s) Administered    COVID-19 MODERNA VACC 0.5 ML IM 01/26/2021, 02/22/2021, 10/28/2021, 05/14/2022    COVID-19 Moderna Vac BIVALENT 12 Yr+ IM 0.5 ML 10/26/2022    COVID-19 Moderna mRNA Vaccine 12 Yr+ 50 mcg/0.5 mL (Spikevax) 10/17/2023    INFLUENZA 10/13/2017, 09/24/2018, 09/15/2019, 09/06/2021, 09/10/2022, 09/23/2023    Influenza Split High Dose Preservative Free IM 10/13/2017, 09/24/2018, 09/20/2019    Influenza, high dose seasonal 0.7 mL 08/21/2020    Pneumococcal Conjugate 13-Valent 10/13/2017    Pneumococcal Polysaccharide PPV23 06/24/2019    Tdap 10/24/2019       Past Medical History:   Diagnosis Date    Anxiety 5/6/2019    Disease of thyroid gland     High cholesterol     Multiple thyroid nodules 5/6/2019    Thrombocytopenia (HCC) 5/6/2019       Family History   Problem Relation Age of Onset    Heart disease Mother     Osteoporosis Mother     Heart attack Father     Lung cancer Father     Heart disease Father     Colon cancer Paternal Aunt     Colon cancer Paternal Uncle     No Known Problems Sister     No Known Problems Brother     No Known Problems Daughter     No Known Problems Sister     No Known Problems Brother     No Known Problems Brother      Past Surgical History:   Procedure Laterality Date    APPENDECTOMY      HERNIA REPAIR      HYSTERECTOMY      OOPHORECTOMY      VAGINAL PROLAPSE REPAIR      Uterine prolase     Social History     Tobacco Use    Smoking status: Never     Smokeless tobacco: Never   Vaping Use    Vaping status: Never Used   Substance Use Topics    Alcohol use: Yes    Drug use: Never     E-Cigarette/Vaping    E-Cigarette Use Never User      E-Cigarette/Vaping Substances    Nicotine No     THC No     CBD No     Flavoring No     Other No     Unknown No        Family History: non-contributory    Meds/Allergies   Prior to Admission Medications   Prescriptions Last Dose Informant Patient Reported? Taking?   Ascorbic Acid (vitamin C) 1000 MG tablet  Self Yes No   Sig: Take 12,000 mg by mouth daily   LORazepam (ATIVAN) 0.5 mg tablet   No No   Sig: Take 1 tablet (0.5 mg total) by mouth daily at bedtime   aspirin 81 mg chewable tablet  Self Yes No   Sig: Chew 81 mg daily   cetirizine (ZyrTEC) 10 mg tablet  Self Yes No   Sig: Take 10 mg by mouth daily   cholecalciferol (VITAMIN D3) 1,000 units tablet  Self Yes No   Sig: Take 1,000 Units by mouth daily   hydrocortisone (ANUSOL-HC) 2.5 % rectal cream   No No   Sig: Apply topically 2 (two) times a day   hydrocortisone 2.5 % cream  Self No No   Sig: Apply topically 2 (two) times a day as needed (hemmorhoid)   ibuprofen (MOTRIN) 200 mg tablet  Self Yes No   Sig: Take 200 mg by mouth every 6 (six) hours as needed for mild pain   rosuvastatin (CRESTOR) 10 MG tablet  Self No No   Sig: TAKE 1 TABLET BY MOUTH DAILY      Facility-Administered Medications: None       Allergies   Allergen Reactions    Penicillins Rash       PHYSICAL EXAM        Objective   Vitals:   First set: Temperature: 98.2 °F (36.8 °C) (04/26/24 1556)  Pulse: 86 (04/26/24 1556)  Respirations: 18 (04/26/24 1556)  Blood Pressure: 160/76 (04/26/24 1556)  SpO2: 97 % (04/26/24 1556)    Primary Survey:   (A) Airway: Intact  (B) Breathing: Equal bilateral breath sounds  (C) Circulation: Pulses:   pedal  2/4 and radial  2/4  (D) Disabliity:  GCS Total:  15  (E) Expose:  Completed    Secondary Survey: (Click on Physical Exam tab above)  Physical Exam  Vitals and nursing note  reviewed. Exam conducted with a chaperone present.   Constitutional:       General: She is not in acute distress.     Appearance: Normal appearance. She is not ill-appearing or diaphoretic.   HENT:      Head: Normocephalic.      Comments: Subcentimeter abrasion above lateral aspect of left eyebrow, not actively bleeding, no overlying swelling, no underlying crepitus, deformity, or tenderness to palpation.  No tenderness to palpation of the entire skull and facial bones.     Right Ear: External ear normal.      Left Ear: External ear normal.      Nose: Nose normal.      Mouth/Throat:      Mouth: Mucous membranes are moist.      Pharynx: Oropharynx is clear.   Eyes:      General: No scleral icterus.     Extraocular Movements: Extraocular movements intact.      Conjunctiva/sclera: Conjunctivae normal.      Pupils: Pupils are equal, round, and reactive to light.   Neck:      Comments: C Collar in place.  Cardiovascular:      Rate and Rhythm: Normal rate and regular rhythm.      Pulses: Normal pulses.      Heart sounds: Normal heart sounds. No murmur heard.     No friction rub. No gallop.   Pulmonary:      Effort: Pulmonary effort is normal. No respiratory distress.      Breath sounds: Normal breath sounds.   Chest:      Chest wall: No tenderness.   Abdominal:      General: Abdomen is flat. Bowel sounds are normal. There is no distension.      Palpations: Abdomen is soft.      Tenderness: There is no abdominal tenderness.   Musculoskeletal:         General: No swelling or tenderness. Normal range of motion.      Cervical back: Neck supple. No tenderness.      Right lower leg: No edema.      Left lower leg: No edema.      Comments: No tenderness to palpation of the cervical, thoracic, or lumbar spines.  No tenderness ovation of the thorax or bilateral upper or lower extremities.  Has full, pain-free, active range of motion of the bilateral upper and lower extremities.  Specifically, no tenderness to palpation of the  bilateral wrists, no snuffbox tenderness.  Bilateral upper and lower extremities neurovascularly intact.   Lymphadenopathy:      Cervical: No cervical adenopathy.   Skin:     General: Skin is warm and dry.      Capillary Refill: Capillary refill takes less than 2 seconds.      Coloration: Skin is not pale.      Findings: No rash.   Neurological:      General: No focal deficit present.      Mental Status: She is alert and oriented to person, place, and time.      GCS: GCS eye subscore is 4. GCS verbal subscore is 5. GCS motor subscore is 6.      Sensory: Sensation is intact. No sensory deficit.      Motor: Motor function is intact. No weakness.   Psychiatric:         Mood and Affect: Mood normal.         Behavior: Behavior normal.         Cervical spine cleared by clinical criteria? No (imaging required)      Invasive Devices       None                   Lab Results:   Results Reviewed       None                   Imaging Studies:   Direct to CT: Yes  CT head without contrast   Final Result by Devin House MD (04/26 1647)      No acute intracranial abnormality.                  Workstation performed: [id number]         CT spine cervical without contrast   Final Result by Devin House MD (04/26 1707)      No cervical spine fracture or traumatic malalignment.                  Workstation performed: [id number]               Procedures  Universal Protocol:  Consent: Verbal consent obtained.  Risks and benefits: risks, benefits and alternatives were discussed  Site marked: the operative site was marked  Required items: required blood products, implants, devices, and special equipment available  Patient identity confirmed: verbally with patient  Laceration repair    Date/Time: 4/26/2024 5:05 PM    Performed by: Laurence Cross DO  Authorized by: Laurence Cross DO  Body area: head/neck  Location details: left eyebrow  Laceration length: 0.5 cm  Foreign bodies: no foreign bodies      Procedure  Details:  Preparation: Patient was prepped and draped in the usual sterile fashion.  Irrigation solution: saline  Irrigation method: syringe  Amount of cleaning: standard  Skin closure: glue  Approximation: close  Approximation difficulty: simple  Patient tolerance: patient tolerated the procedure well with no immediate complications               ED Course  ED Course as of 04/26/24 1713   Fri Apr 26, 2024   1602 Last tetanus 10/2019.  No indication for booster.   1658 CT head without contrast    IMPRESSION:     No acute intracranial abnormality.     1707 Wound cleaned, is well approximated but does ooze.  After discussion, skin adhesive applied with excellent hemostasis.  Wound care, return precautions discussed.  To follow up with PCP for wound check on Monday, concussion clinic if needed.    1711 CT spine cervical without contrast  IMPRESSION:     No cervical spine fracture or traumatic malalignment.        1711 Cervical spine cleared clinically.           Medical Decision Making  Pt presents after mechanical fall, no prodromal symptoms prior to fall.  Mildly hypertensive, otherwise no VS abnormalities.  Physical exam with abrasion above left eyebrow, otherwise no outward evidence of trauma.   Given no significant blood loss and no symptoms prior to fall, labs and ECG not indicated.    Plan to obtain CT head and cervical spine, clean wound(and repair if necessary), and d/c with return precautions if imaging normal.  See ED course for remainder of discussion.    Amount and/or Complexity of Data Reviewed  External Data Reviewed: notes.  Radiology: ordered. Decision-making details documented in ED Course.    Risk  OTC drugs.                Disposition  [facility] One Transfer: No  Final diagnoses:   Fall, initial encounter   Contusion of forehead, initial encounter   Abrasion of forehead, initial encounter   Injury of head, initial encounter   Thyroid incidentaloma     Time reflects when diagnosis was documented in  both MDM as applicable and the Disposition within this note       Time User Action Codes Description Comment    4/26/2024  5:02 PM Laurence Cross [W19.XXXA] Fall, initial encounter     4/26/2024  5:03 PM Laurence Cross Add [S00.83XA] Contusion of forehead, initial encounter     4/26/2024  5:03 PM Laurence Cross Add [S00.81XA] Abrasion of forehead, initial encounter     4/26/2024  5:03 PM Laurence Cross Modify [W19.XXXA] Fall, initial encounter     4/26/2024  5:03 PM Laurence Cross Modify [S00.81XA] Abrasion of forehead, initial encounter     4/26/2024  5:07 PM Laurence Cross [S09.90XA] Injury of head, initial encounter     4/26/2024  5:11 PM Laurence Cross [D49.7] Thyroid incidentaloma           ED Disposition       ED Disposition   Discharge    Condition   Stable    Date/Time   Fri Apr 26, 2024  5:11 PM    Comment   Ly TISH Richardson discharge to home/self care.                   Follow-up Information       Follow up With Specialties Details Why Contact Info Additional Information    [facility]    [address] [place] [id number]  [phone] PT ACCESS CENTER [facility], [address] [place] [address], [id number]   [phone]    Dahiana Salvador DO Family Medicine In 3 days wound check [address] [facility].  [address]  [facility] [id number] [phone]             Patient's Medications   Discharge Prescriptions    No medications on file         PDMP Review         Value Time User    PDMP Reviewed  Yes 1/29/2024 10:20 AM Dahiana Salvador DO            ED Provider  Electronically Signed by           Laurence Cross DO  04/26/24 [id number]

## 2024-04-26 NOTE — DISCHARGE INSTRUCTIONS
If you develop any symptoms of infection such as redness, drainage, increased swelling, or severe pain in your abrasion, please return to the ER.  Please take 650mg of acetaminophen(tylenol) every 6 hours as needed for pain.  If you have symptoms of concussion such as headaches, light sensitivity, or nausea, please follow up with the concussion program.

## 2024-04-29 ENCOUNTER — NURSE TRIAGE (OUTPATIENT)
Age: 74
End: 2024-04-29

## 2024-04-29 NOTE — TELEPHONE ENCOUNTER
Patient called regarding one episode of Diarrhea , home advice given. Also patient was seen In the ER for a fall on Friday . Hit her head above the left eye, laceration. Was advised to follow up with PCP for wound check. Appointment tomorrow. Will return to he Er if develops any visual disturbance, severe headache, vomiting and or dizziness.

## 2024-04-29 NOTE — TELEPHONE ENCOUNTER
"Reason for Disposition  • MILD diarrhea (e.g., 1-3 or more stools than normal in past 24 hours) diarrhea without known cause and present > 7 days    Answer Assessment - Initial Assessment Questions  1. DIARRHEA SEVERITY: \"How bad is the diarrhea?\" \"How many extra stools have you had in the past 24 hours than normal?\"     - NO DIARRHEA (SCALE 0)    - MILD (SCALE 1-3): Few loose or mushy BMs; increase of 1-3 stools over normal daily number of stools; mild increase in ostomy output.    -  MODERATE (SCALE 4-7): Increase of 4-6 stools daily over normal; moderate increase in ostomy output.  * SEVERE (SCALE 8-10; OR 'WORST POSSIBLE'): Increase of 7 or more stools daily over normal; moderate increase in ostomy output; incontinence.      Mild   2. ONSET: \"When did the diarrhea begin?\"       This morning   3. BM CONSISTENCY: \"How loose or watery is the diarrhea?\"       Watery   4. VOMITING: \"Are you also vomiting?\" If Yes, ask: \"How many times in the past 24 hours?\"       Denies   5. ABDOMINAL PAIN: \"Are you having any abdominal pain?\" If Yes, ask: \"What does it feel like?\" (e.g., crampy, dull, intermittent, constant)       Denies   6. ABDOMINAL PAIN SEVERITY: If present, ask: \"How bad is the pain?\"  (e.g., Scale 1-10; mild, moderate, or severe)    - MILD (1-3): doesn't interfere with normal activities, abdomen soft and not tender to touch     - MODERATE (4-7): interferes with normal activities or awakens from sleep, tender to touch     - SEVERE (8-10): excruciating pain, doubled over, unable to do any normal activities        N/a  7. ORAL INTAKE: If vomiting, \"Have you been able to drink liquids?\" \"How much fluids have you had in the past 24 hours?\"      More than 40 ounces   8. HYDRATION: \"Any signs of dehydration?\" (e.g., dry mouth [not just dry lips], too weak to stand, dizziness, new weight loss) \"When did you last urinate?\"      Denies   9. EXPOSURE: \"Have you traveled to a foreign country recently?\" \"Have you been " "exposed to anyone with diarrhea?\" \"Could you have eaten any food that was spoiled?\"      Denies   10. ANTIBIOTIC USE: \"Are you taking antibiotics now or have you taken antibiotics in the past 2 months?\"        Denies   11. OTHER SYMPTOMS: \"Do you have any other symptoms?\" (e.g., fever, blood in stool)        Head contusion -Friday , some lightheadedness   12. PREGNANCY: \"Is there any chance you are pregnant?\" \"When was your last menstrual period?\"        N/a    Protocols used: Diarrhea-ADULT-[place]    "

## 2024-04-29 NOTE — TELEPHONE ENCOUNTER
Regarding: diarrhea after ED visit  ----- Message from Blanca Middleton sent at 4/29/2024  8:25 AM EDT -----  Patient seen in ER on Friday for head contusion.  She felt fine all weekend but is now experiencing diarrhea.  She states she was outside in the sun yesterday and took a hot bath.  No new medications were taken.  Not like her to have this.  Tried calling Triage nurse but unavailable .

## 2024-04-30 ENCOUNTER — OFFICE VISIT (OUTPATIENT)
Dept: FAMILY MEDICINE CLINIC | Facility: CLINIC | Age: 74
End: 2024-04-30
Payer: COMMERCIAL

## 2024-04-30 VITALS
HEART RATE: 60 BPM | SYSTOLIC BLOOD PRESSURE: 148 MMHG | RESPIRATION RATE: 16 BRPM | DIASTOLIC BLOOD PRESSURE: 74 MMHG | OXYGEN SATURATION: 100 % | BODY MASS INDEX: 25.68 KG/M2 | WEIGHT: 150.4 LBS | HEIGHT: 64 IN

## 2024-04-30 DIAGNOSIS — S06.0X0A CONCUSSION WITHOUT LOSS OF CONSCIOUSNESS, INITIAL ENCOUNTER: Primary | ICD-10-CM

## 2024-04-30 DIAGNOSIS — K59.00 CONSTIPATION, UNSPECIFIED CONSTIPATION TYPE: ICD-10-CM

## 2024-04-30 PROCEDURE — 99214 OFFICE O/P EST MOD 30 MIN: CPT | Performed by: FAMILY MEDICINE

## 2024-04-30 PROCEDURE — G2211 COMPLEX E/M VISIT ADD ON: HCPCS | Performed by: FAMILY MEDICINE

## 2024-04-30 NOTE — PATIENT INSTRUCTIONS
Try the magnesium three times per week  Right now, you do have a concussion.  This should get better within the next week or so--if you don't, let me know, and we'll refer to PT  Call me if anything worsens

## 2024-04-30 NOTE — PROGRESS NOTES
Name: yL Richardson      : 1950      MRN: [id number]  Encounter Provider: Khadijah Braga DO  Encounter Date: 2024   Encounter department: [facility]    Assessment & Plan     1. Concussion without loss of consciousness, initial encounter  Comments:  mild concussion sx  advised regular routine  hydrate  symptoms should resolve in the next week or so  if they do not, lmk and we can consider PT    2. Constipation, unspecified constipation type  Comments:  okay for mg supplement, but may need to use less than daily.  would try 3x/wk           Subjective      HPI  Pt presents in f/u for ED visit 3 days ago.  ED records/images reviewed.  Was walking and tripped on a curb.  No loc.  Struck L forehead.  Went to ED.  CT neck/head clear.  D/c'd home with instructions to f/u.   Since d/c, slight feeling off balance/dizziness/nausea.  No concentration deficit.  Slight dull headache.  No visual change or light sensitivity.  +fatigue.      Pt wonders about taking mg glycinate/mg citrate gummies which she wants to take for constipation.  Used daily and had some diarrhea.  Skipped last night and stools solid today      Review of Systems  See hpi; all other systems negative      Current Outpatient Medications on File Prior to Visit   Medication Sig    Ascorbic Acid (vitamin C) 1000 MG tablet Take 12,000 mg by mouth daily    aspirin 81 mg chewable tablet Chew 81 mg daily    cetirizine (ZyrTEC) 10 mg tablet Take 10 mg by mouth daily    cholecalciferol (VITAMIN D3) 1,000 units tablet Take 1,000 Units by mouth daily    hydrocortisone (ANUSOL-HC) 2.5 % rectal cream Apply topically 2 (two) times a day    hydrocortisone 2.5 % cream Apply topically 2 (two) times a day as needed (hemmorhoid)    ibuprofen (MOTRIN) 200 mg tablet Take 200 mg by mouth every 6 (six) hours as needed for mild pain    LORazepam (ATIVAN) 0.5 mg tablet Take 1 tablet (0.5 mg total) by mouth daily at bedtime    rosuvastatin  "(CRESTOR) 10 MG tablet TAKE 1 TABLET BY MOUTH DAILY       Objective     /74   Pulse 60   Resp 16   Ht 5' 3.5\" (1.613 m)   Wt 68.2 kg (150 lb 6.4 oz)   SpO2 100%   BMI 26.22 kg/m²     Physical Exam  Constitutional:       General: She is not in acute distress.     Appearance: Normal appearance. She is well-developed.   HENT:      Head: Normocephalic.      Comments: Abrasion and ecchymosis/edema L eyebrow area  No signs of infection     Right Ear: Tympanic membrane, ear canal and external ear normal.      Left Ear: Tympanic membrane, ear canal and external ear normal.      Nose: Nose normal.      Mouth/Throat:      Mouth: Mucous membranes are moist.      Pharynx: Oropharynx is clear. No posterior oropharyngeal erythema.   Eyes:      Extraocular Movements: Extraocular movements intact.      Conjunctiva/sclera: Conjunctivae normal.      Pupils: Pupils are equal, round, and reactive to light.   Neck:      Thyroid: No thyromegaly.      Vascular: No carotid bruit or JVD.   Cardiovascular:      Rate and Rhythm: Normal rate and regular rhythm.      Heart sounds: S1 normal and S2 normal. No murmur heard.     No friction rub. No gallop. No S3 or S4 sounds.   Pulmonary:      Effort: Pulmonary effort is normal.      Breath sounds: Normal breath sounds. No wheezing, rhonchi or rales.   Abdominal:      General: Bowel sounds are normal. There is no distension.      Palpations: Abdomen is soft.      Tenderness: There is no abdominal tenderness.   Musculoskeletal:      Cervical back: Neck supple.   Lymphadenopathy:      Cervical: No cervical adenopathy.   Neurological:      General: No focal deficit present.      Mental Status: She is alert and oriented to person, place, and time.      Cranial Nerves: No cranial nerve deficit.      Sensory: No sensory deficit.      Motor: No weakness, tremor, abnormal muscle tone or pronator drift.      Coordination: Romberg sign negative. Finger-Nose-Finger Test normal. Rapid alternating " movements normal.      Deep Tendon Reflexes: Reflexes are normal and symmetric. Reflexes normal.   Psychiatric:         Mood and Affect: Mood normal.         Behavior: Behavior normal.       Khadijah Braga, DO

## 2024-05-07 DIAGNOSIS — E78.2 MIXED HYPERLIPIDEMIA: ICD-10-CM

## 2024-05-08 RX ORDER — ROSUVASTATIN CALCIUM 10 MG/1
TABLET, COATED ORAL
Qty: 100 TABLET | Refills: 1 | Status: SHIPPED | OUTPATIENT
Start: 2024-05-08

## 2024-05-13 ENCOUNTER — TELEPHONE (OUTPATIENT)
Age: 74
End: 2024-05-13

## 2024-05-13 NOTE — TELEPHONE ENCOUNTER
Patient had a fall and concussion on 4/26. Patient has concerns that her BP has been running higher than usual. Range 153//70. Patient denies pain, headaches and all neuro's WNL. Requested appointment to discuss.

## 2024-05-13 NOTE — TELEPHONE ENCOUNTER
Patient has appointment on 05/14/2024 with Dr. Ferraro. Patient was seen in the ED.    Routing to Dr. Ferraro FYI.

## 2024-05-14 ENCOUNTER — OFFICE VISIT (OUTPATIENT)
Dept: FAMILY MEDICINE CLINIC | Facility: CLINIC | Age: 74
End: 2024-05-14
Payer: COMMERCIAL

## 2024-05-14 VITALS
WEIGHT: 149.6 LBS | RESPIRATION RATE: 16 BRPM | HEIGHT: 64 IN | SYSTOLIC BLOOD PRESSURE: 132 MMHG | OXYGEN SATURATION: 98 % | HEART RATE: 67 BPM | BODY MASS INDEX: 25.54 KG/M2 | TEMPERATURE: 97.8 F | DIASTOLIC BLOOD PRESSURE: 84 MMHG

## 2024-05-14 DIAGNOSIS — R03.0 ELEVATED BP WITHOUT DIAGNOSIS OF HYPERTENSION: Primary | ICD-10-CM

## 2024-05-14 PROCEDURE — G2211 COMPLEX E/M VISIT ADD ON: HCPCS | Performed by: FAMILY MEDICINE

## 2024-05-14 PROCEDURE — 99214 OFFICE O/P EST MOD 30 MIN: CPT | Performed by: FAMILY MEDICINE

## 2024-05-14 NOTE — PROGRESS NOTES
"Assessment/Plan:       Problem List Items Addressed This Visit    None  Visit Diagnoses     Elevated BP without diagnosis of hypertension    -  Primary    mildly elevated  she will monitor at home 3x/week  avoid excess salt  send readings  f/u [place] for BP check and bring her home cuff- 4wks                 Subjective:     Ly is a 74 y.o. female here today with chief complaint below:  Chief Complaint   Patient presents with   • Fall     Patient had a fall 3 weeks ago, patient is feeling pretty good other than her BP. Patient has been taking her BP at home and is concerned because it is reading high.    • Immunizations     Discuss Zoster immunization.      - CC above per clinical staff and reviewed.    HPI:    She notes that she tripped and fell three weeks ago.  Ever since the fall, her BP is higher.    Checking at home the past few days.    A few days after she fell, she had some lightheadedness/HA.    Last week on Monday, started feeling like herself again  Then on Tuesday last week, banged her head on the car (opposite side from where she hit it during her fall)  Took a few days to feel well again.     She is still going out for walks, keeping active.   Didn't take tylenol or ibuprofen more than once during the concussion.   One cup coffee/day.  Decaf tea rest of the day.    Has been worried about her blood pressure, finds she is anxious about this. Her blood pressure was elevated after her fall.    Home readings:   153/84, 149/74, 136/66, 137/68, 134/67  The following portions of the patient's history were reviewed and updated as appropriate: allergies, current medications, past family history, past medical history, past social history, past surgical history and problem list.    ROS:  Review of Systems     Objective:      /84 (BP Location: Left arm, Cuff Size: Standard)   Pulse 67   Temp 97.8 °F (36.6 °C) (Temporal)   Resp 16   Ht 5' 3.5\" (1.613 m)   Wt 67.9 kg (149 lb 9.6 oz)   SpO2 98%   BMI " 26.08 kg/m²   BP Readings from Last 3 Encounters:   05/14/24 132/84   04/30/24 148/74   04/26/24 154/69     Wt Readings from Last 3 Encounters:   05/14/24 67.9 kg (149 lb 9.6 oz)   04/30/24 68.2 kg (150 lb 6.4 oz)   11/07/23 68.1 kg (150 lb 2 oz)               Physical Exam:   Physical Exam  Vitals and nursing note reviewed.   Constitutional:       Appearance: Normal appearance. She is not ill-appearing.   Cardiovascular:      Rate and Rhythm: Normal rate and regular rhythm.      Heart sounds: No murmur heard.  Pulmonary:      Effort: Pulmonary effort is normal.      Breath sounds: Normal breath sounds. No wheezing or rhonchi.   Musculoskeletal:      Right lower leg: No edema.      Left lower leg: No edema.   Neurological:      Mental Status: She is alert and oriented to person, place, and time.   Psychiatric:         Behavior: Behavior normal.      Comments: Mildly anxious concerning BP

## 2024-06-03 DIAGNOSIS — K64.0 GRADE I HEMORRHOIDS: ICD-10-CM

## 2024-06-03 NOTE — TELEPHONE ENCOUNTER
Reason for call:   [x] Refill   [] Prior Auth  [] Other:     Office:   [x] PCP/Provider -Dahiana Salvador/Carlos KUMAR    [] Specialty/Provider -     Medication: Hydrocortinsone    Dose/Frequency: 2.5 % cream    Quantity: 28 g     Pharmacy: [address]    Does the patient have enough for 3 days?   [] Yes   [x] No - Send as HP to POD

## 2024-06-12 ENCOUNTER — CLINICAL SUPPORT (OUTPATIENT)
Dept: FAMILY MEDICINE CLINIC | Facility: CLINIC | Age: 74
End: 2024-06-12

## 2024-06-12 VITALS — TEMPERATURE: 97.9 F | SYSTOLIC BLOOD PRESSURE: 152 MMHG | DIASTOLIC BLOOD PRESSURE: 70 MMHG | HEART RATE: 68 BPM

## 2024-06-12 DIAGNOSIS — R03.0 ELEVATED BP WITHOUT DIAGNOSIS OF HYPERTENSION: Primary | ICD-10-CM

## 2024-06-12 PROCEDURE — RECHECK

## 2024-06-12 NOTE — PROGRESS NOTES
Flow Time Flow Value User File Time Action   Blood Pressure   06/12/24 0939 152/70  manual  06/12/24 0941 Current   06/12/24 0937 172/85 Abnormal   Pt's home cuff  06/12/24 0940 Current     --  Pt's home cuff  06/12/24 0938 Initial   Pulse   06/12/24 0939 68  06/12/24 0941 Current   06/12/24 0937 78  06/12/24 0940 Current   Temperature   06/12/24 0939 97.9 °F (36.6 °C)  06/12/24 0941 Current   Temp Source   06/12/24 0939 Temporal  06/12/24 0941 Current   User Key (r) = Recorded By, (t) = Taken By, (c) = Cosigned By   Initials Effective Dates Name Provider Type Discipline    12/17/21 - Krissy Pace Medical Assistant Interdisciplinary     It looks like her home cuff is running high. Her home readings are normal, so that is reassuring. She does not need medication. She may want to invest in a new cuff and bring it in for calibration.

## 2024-06-13 ENCOUNTER — TELEPHONE (OUTPATIENT)
Age: 74
End: 2024-06-13

## 2024-06-13 NOTE — TELEPHONE ENCOUNTER
The patient returned call, she just bought a BP machine, Tabares.  Should she buy a different brand?  Apparently the numbers are quite high on this machine and she was advised to get another.  If someone can recommend a brand of BP machine, she would be grateful.  Please call patient at [phone].

## 2024-06-13 NOTE — PROGRESS NOTES
Called patient, she's been advised of Dr. Salvador's message. Patient will buy a new BP cuff, then come in for another nurse visit in a few days after purchasing it to calibrate it.

## 2024-06-18 ENCOUNTER — TELEPHONE (OUTPATIENT)
Age: 74
End: 2024-06-18

## 2024-06-18 DIAGNOSIS — R53.1 GENERAL WEAKNESS: Primary | ICD-10-CM

## 2024-06-18 NOTE — TELEPHONE ENCOUNTER
Patient feels that she would benefit by physical therapy to do routine strengthening exercises as she is having increased difficulty getting out of the bath tub. For example, she needs to grab onto furniture when getting up off the floor. Requests referral order for PT. Please advise.

## 2024-07-02 DIAGNOSIS — F41.9 ANXIETY: ICD-10-CM

## 2024-07-02 NOTE — TELEPHONE ENCOUNTER
Patient called in to increase quantity on Anusol HC 2.5 % on 2/13/24 dr sent prescription for 84 g with 5 refills. Patient will reach out to pharmacy [facility] to see if on file.

## 2024-07-29 ENCOUNTER — TELEPHONE (OUTPATIENT)
Age: 74
End: 2024-07-29

## 2024-07-29 NOTE — TELEPHONE ENCOUNTER
Ly calling in to let Dr. Salvador know she will not be taking advantage of physical therapy at this time. She doesn't feel that it is necessary right now. She states she may need it in the future.

## 2024-09-23 ENCOUNTER — OFFICE VISIT (OUTPATIENT)
Dept: FAMILY MEDICINE CLINIC | Facility: CLINIC | Age: 74
End: 2024-09-23
Payer: COMMERCIAL

## 2024-09-23 ENCOUNTER — PATIENT MESSAGE (OUTPATIENT)
Dept: FAMILY MEDICINE CLINIC | Facility: CLINIC | Age: 74
End: 2024-09-23

## 2024-09-23 VITALS
WEIGHT: 147 LBS | DIASTOLIC BLOOD PRESSURE: 84 MMHG | TEMPERATURE: 98.2 F | SYSTOLIC BLOOD PRESSURE: 142 MMHG | OXYGEN SATURATION: 98 % | HEIGHT: 64 IN | HEART RATE: 82 BPM | BODY MASS INDEX: 25.1 KG/M2 | RESPIRATION RATE: 16 BRPM

## 2024-09-23 DIAGNOSIS — D69.6 THROMBOCYTOPENIA (HCC): ICD-10-CM

## 2024-09-23 DIAGNOSIS — E55.9 VITAMIN D DEFICIENCY: ICD-10-CM

## 2024-09-23 DIAGNOSIS — M54.50 ACUTE RIGHT-SIDED LOW BACK PAIN WITHOUT SCIATICA: ICD-10-CM

## 2024-09-23 DIAGNOSIS — M54.50 LOW BACK PAIN, UNSPECIFIED BACK PAIN LATERALITY, UNSPECIFIED CHRONICITY, UNSPECIFIED WHETHER SCIATICA PRESENT: Primary | ICD-10-CM

## 2024-09-23 DIAGNOSIS — E05.90 HYPERTHYROIDISM: ICD-10-CM

## 2024-09-23 DIAGNOSIS — E05.90 SUBCLINICAL HYPERTHYROIDISM: ICD-10-CM

## 2024-09-23 DIAGNOSIS — E78.2 MIXED HYPERLIPIDEMIA: ICD-10-CM

## 2024-09-23 DIAGNOSIS — R03.0 ELEVATED BP WITHOUT DIAGNOSIS OF HYPERTENSION: ICD-10-CM

## 2024-09-23 LAB
SL AMB  POCT GLUCOSE, UA: ABNORMAL
SL AMB LEUKOCYTE ESTERASE,UA: 70
SL AMB POCT BILIRUBIN,UA: ABNORMAL
SL AMB POCT BLOOD,UA: ABNORMAL
SL AMB POCT CLARITY,UA: ABNORMAL
SL AMB POCT COLOR,UA: YELLOW
SL AMB POCT KETONES,UA: ABNORMAL
SL AMB POCT NITRITE,UA: ABNORMAL
SL AMB POCT PH,UA: 6
SL AMB POCT SPECIFIC GRAVITY,UA: 1.02
SL AMB POCT URINE PROTEIN: 0.15
SL AMB POCT UROBILINOGEN: 3.5

## 2024-09-23 PROCEDURE — 99214 OFFICE O/P EST MOD 30 MIN: CPT | Performed by: FAMILY MEDICINE

## 2024-09-23 PROCEDURE — 81003 URINALYSIS AUTO W/O SCOPE: CPT | Performed by: FAMILY MEDICINE

## 2024-09-23 RX ORDER — CYCLOBENZAPRINE HCL 5 MG
5 TABLET ORAL
Qty: 10 TABLET | Refills: 0 | Status: SHIPPED | OUTPATIENT
Start: 2024-09-23

## 2024-09-23 NOTE — PATIENT INSTRUCTIONS
"Bring a copy of your Living Will to your next visit.     Patient Education     Back exercises   The Basics   Written by the doctors and editors at [facility]   Why do I need to do back exercises? -- Back exercises can help ease back pain and might help prevent future back pain. Long term, it is important to strengthen the muscles in your lower back, buttocks, and belly. These are your \"core muscles.\" Stretching exercises are also important to keep your muscles flexible.  Below are some stretching and strengthening exercises that might help you. Other forms of movement can help ease or prevent back pain, too. For example, some people like to walk, do aerobic exercise, or do yoga or marisela chi. The most important thing is to move your body. Your doctor, nurse, or physical therapist can help you find different types of activity that work for you.  What stretching exercises should I do? -- Below are some examples of stretching exercises. Warm up your muscles before stretching to help prevent injury. To warm up, you can walk, jog, or cycle for a few minutes.  Start by repeating each of these stretches 2 to 3 times. Try to hold each stretch for 5 to 10 seconds, and try to do the stretches 2 to 3 times each day. Breathe slowly and deeply as you do the exercises. Never bounce when doing stretches.   Cat-cow stretch (figure 1) - Start on all fours on the floor, with your hands under your shoulders, knees under your hips, and your back flat. First, tuck your chin and tighten your stomach muscles as you round your back (like a \"cat\"). Hold the stretch for about 10 seconds. Rest for a few seconds as you flatten your back. Next, lift your chin and let your belly and lower back sink toward the floor (like a \"cow\"). Hold the stretch for about 10 seconds.   Single knee-to-chest stretches (figure 2) ? While lying on your back, bend your knees with your feet flat on the floor. Pull 1 knee toward your chest until you feel a stretch in your " lower back and buttock area. Lower, and repeat with the other knee. If you have knee problems, pull your knee up by grabbing the back of your thigh instead of the front of your knee. You can also do this exercise by grabbing both knees at the same time.   Lower trunk rotations (figure 3) ? While lying on your back, bend your knees with your feet flat on the floor. Keep your knees and ankles together, and then drop them to 1 side. Keep both of your shoulders touching the floor until you feel a stretch in the muscles at the side of the back. Repeat on the other side.   Lower back stretches seated (figure 4) ? Sit in a chair with your feet spread about shoulder width apart. Then, lean forward until you feel a stretch in your lower back.  What strengthening exercises should I do? -- Below are some examples of strengthening exercises.  Start by doing each exercise 2 to 3 times. Work up to doing each exercise 10 times. Hold each exercise for 3 to 5 seconds, and try to do the exercises 2 to 3 times each day. Do all exercises slowly.   Shoulder blade squeezes (figure 5) ? Pinch your shoulder blades together on your upper back, and hold 3 to 5 seconds. You can also do these 1 side at a time. Sit with good posture, and make sure that your shoulders do not rise up when you do this exercise. Relax.   Pelvic tilts (figure 6) ? Lie on your back with your knees bent and feet flat on the floor. Tighten your stomach muscles, and press your lower back down to the floor. Relax. You should be able to breathe comfortably during this exercise.   Hip lifts (figure 7) ? Lie on your back with your knees bent and feet flat on the floor. Tighten your stomach muscles, keep your back flat, and lift your buttocks off of the floor. Relax. You should feel this in your buttocks, not in your lower back.  What else should I know?    Exercise, including stretching, might be slightly uncomfortable. But you should not have sharp or severe pain. If you  "do get severe pain, stop what you are doing. If severe pain continues, call your doctor or nurse.   Do not hold your breath when exercising. If you tend to hold your breath, try counting out loud when exercising. If any exercise bothers you, stop right away.   Always warm up before exercising. Warm muscles stretch much easier than cool muscles. Stretching cool muscles can lead to injury.   Doing exercises before a meal can be a good way to get into a routine.  All topics are updated as new evidence becomes available and our peer review process is complete.  This topic retrieved from [employer] on: Apr 03, 2024.  Topic 968310 Version 2.0  Release: 32.2.4 - C32.92  © 2024 UpToDate, Inc. and/or its affiliates. All rights reserved.  figure 1: Cat-cow stretch     Start on all fours on the floor, with hands under your shoulders, knees under your hips, and your back flat. First, tuck your chin and tighten your stomach muscles as you round your back (like a \"cat\"). Hold the stretch for about 10 seconds. Rest for a few seconds as you flatten your back. Next, lift your chin and let your belly and lower back sink toward the floor (like a \"cow\"). Hold the stretch for about 10 seconds.  Graphic [id number] Version 1.0  figure 2: Single knee-to-chest stretch     Lie on your back, bend your knees, and have your feet flat on the floor. Pull 1 knee toward your chest until you feel a stretch in your lower back and buttock area. Repeat with the other knee. If you have knee problems, pull your knee up by grabbing the back of your thigh instead of the front of your knee. You can also do this exercise by grabbing both knees at the same time.  Graphic [id number] Version 1.0  figure 3: Lower trunk rotation     While lying on your back, bend your knees and have your feet flat on the floor. Keep your legs together and then drop them to 1 side. Keep both of your shoulders touching the floor until you feel a stretch in the muscles at the side of the " back. Repeat on the other side.  Graphic 132688 Version 1.0  figure 4: Lower back stretch     Sit in a chair with your feet spread about shoulder width apart. Then, lean forward until you feel a stretch in your lower back.  Graphic 490508 Version 1.0  figure 5: Shoulder blade squeezes     Pinch your shoulder blades together on your upper back and hold for 3 to 5 seconds. Make sure that you are sitting with good posture and that your shoulders do not raise up when you do this exercise. Relax.  Graphic 533132 Version 1.0  figure 6: Pelvic tilts     Lie on your back with your knees bent and feet flat on the floor. Tighten your stomach muscles and press your lower back down to the floor. Relax.  Graphic [id number] Version 1.0  figure 7: Hip lifts     Lie on your back with your knees bent and feet flat on the floor. Tighten your stomach muscles and lift your buttocks off of the floor. Relax.  Graphic 569041 Version 1.0  Consumer Information Use and Disclaimer   Disclaimer: This generalized information is a limited summary of diagnosis, treatment, and/or medication information. It is not meant to be comprehensive and should be used as a tool to help the user understand and/or assess potential diagnostic and treatment options. It does NOT include all information about conditions, treatments, medications, side effects, or risks that may apply to a specific patient. It is not intended to be medical advice or a substitute for the medical advice, diagnosis, or treatment of a health care provider based on the health care provider's examination and assessment of a patient's specific and unique circumstances. Patients must speak with a health care provider for complete information about their health, medical questions, and treatment options, including any risks or benefits regarding use of medications. This information does not endorse any treatments or medications as safe, effective, or approved for treating a specific patient.  UpToDate, Inc[employer] and its affiliates disclaim any warranty or liability relating to this information or the use thereof.The use of this information is governed by the Terms of Use, available at https://www.wolterskluwer.com/en/know/clinical-effectiveness-terms. 2024© UpToDate, Inc. and its affiliates and/or licensors. All rights reserved.  Copyright   © 2024 Up[employer]Date, [employer]. and/or its affiliates. All rights reserved.

## 2024-09-23 NOTE — PROGRESS NOTES
Assessment/Plan:     Assessment & Plan  Low back pain, unspecified back pain laterality, unspecified chronicity, unspecified whether sciatica present  Ice or heat. Ibuprofen as needed with food  Home exercises   PT if persists or worsens   Orders:    POCT urine dip auto non-scope    cyclobenzaprine (FLEXERIL) 5 mg tablet; Take 1 tablet (5 mg total) by mouth daily at bedtime as needed for muscle spasms    Ambulatory Referral to Physical Therapy; Future    TSH, 3rd generation; Future    T4, free; Future    CBC and differential; Future    Comprehensive metabolic panel; Future    Lipid panel; Future    Vitamin D 25 hydroxy; Future    POCT urine dip auto non-scope    Acute right-sided low back pain without sciatica  See above.   Orders:    Ambulatory Referral to Physical Therapy; Future    TSH, 3rd generation; Future    T4, free; Future    CBC and differential; Future    Comprehensive metabolic panel; Future    Lipid panel; Future    Vitamin D 25 hydroxy; Future    Mixed hyperlipidemia  Update fasting blood work prior to November appointment   Orders:    TSH, 3rd generation; Future    T4, free; Future    CBC and differential; Future    Comprehensive metabolic panel; Future    Lipid panel; Future    Vitamin D 25 hydroxy; Future    Subclinical hyperthyroidism  Update fasting blood work prior to November appointment   Orders:    TSH, 3rd generation; Future    T4, free; Future    CBC and differential; Future    Comprehensive metabolic panel; Future    Lipid panel; Future    Vitamin D 25 hydroxy; Future    Hyperthyroidism  Update fasting blood work prior to November appointment   Orders:    TSH, 3rd generation; Future    T4, free; Future    CBC and differential; Future    Comprehensive metabolic panel; Future    Lipid panel; Future    Vitamin D 25 hydroxy; Future    Thrombocytopenia (HCC)  Resolved on last blood work. Update fasting blood work prior to November appointment        Vitamin D deficiency  Update fasting blood  "work prior to November appointment   She lowered home dose due to high level in past   Orders:    Vitamin D 25 hydroxy; Future    Elevated BP without diagnosis of hypertension  Will bring in home cuff to follow up for calibration          Pt requesting to be seen every 6 months now. Scheduled for medicare wellness in Nov. Follow up 6 months later for labs, chronic conditions.   Fasting blood work ordered for upcoming appointment.   Patient Instructions   Bring a copy of your Living Will to your next visit.     Patient Education     Back exercises   The Basics   Written by the doctors and editors at [facility]   Why do I need to do back exercises? -- Back exercises can help ease back pain and might help prevent future back pain. Long term, it is important to strengthen the muscles in your lower back, buttocks, and belly. These are your \"core muscles.\" Stretching exercises are also important to keep your muscles flexible.  Below are some stretching and strengthening exercises that might help you. Other forms of movement can help ease or prevent back pain, too. For example, some people like to walk, do aerobic exercise, or do yoga or marisela chi. The most important thing is to move your body. Your doctor, nurse, or physical therapist can help you find different types of activity that work for you.  What stretching exercises should I do? -- Below are some examples of stretching exercises. Warm up your muscles before stretching to help prevent injury. To warm up, you can walk, jog, or cycle for a few minutes.  Start by repeating each of these stretches 2 to 3 times. Try to hold each stretch for 5 to 10 seconds, and try to do the stretches 2 to 3 times each day. Breathe slowly and deeply as you do the exercises. Never bounce when doing stretches.   Cat-cow stretch (figure 1) - Start on all fours on the floor, with your hands under your shoulders, knees under your hips, and your back flat. First, tuck your chin and tighten your " "stomach muscles as you round your back (like a \"cat\"). Hold the stretch for about 10 seconds. Rest for a few seconds as you flatten your back. Next, lift your chin and let your belly and lower back sink toward the floor (like a \"cow\"). Hold the stretch for about 10 seconds.   Single knee-to-chest stretches (figure 2) ? While lying on your back, bend your knees with your feet flat on the floor. Pull 1 knee toward your chest until you feel a stretch in your lower back and buttock area. Lower, and repeat with the other knee. If you have knee problems, pull your knee up by grabbing the back of your thigh instead of the front of your knee. You can also do this exercise by grabbing both knees at the same time.   Lower trunk rotations (figure 3) ? While lying on your back, bend your knees with your feet flat on the floor. Keep your knees and ankles together, and then drop them to 1 side. Keep both of your shoulders touching the floor until you feel a stretch in the muscles at the side of the back. Repeat on the other side.   Lower back stretches seated (figure 4) ? Sit in a chair with your feet spread about shoulder width apart. Then, lean forward until you feel a stretch in your lower back.  What strengthening exercises should I do? -- Below are some examples of strengthening exercises.  Start by doing each exercise 2 to 3 times. Work up to doing each exercise 10 times. Hold each exercise for 3 to 5 seconds, and try to do the exercises 2 to 3 times each day. Do all exercises slowly.   Shoulder blade squeezes (figure 5) ? Pinch your shoulder blades together on your upper back, and hold 3 to 5 seconds. You can also do these 1 side at a time. Sit with good posture, and make sure that your shoulders do not rise up when you do this exercise. Relax.   Pelvic tilts (figure 6) ? Lie on your back with your knees bent and feet flat on the floor. Tighten your stomach muscles, and press your lower back down to the floor. Relax. You " "should be able to breathe comfortably during this exercise.   Hip lifts (figure 7) ? Lie on your back with your knees bent and feet flat on the floor. Tighten your stomach muscles, keep your back flat, and lift your buttocks off of the floor. Relax. You should feel this in your buttocks, not in your lower back.  What else should I know?    Exercise, including stretching, might be slightly uncomfortable. But you should not have sharp or severe pain. If you do get severe pain, stop what you are doing. If severe pain continues, call your doctor or nurse.   Do not hold your breath when exercising. If you tend to hold your breath, try counting out loud when exercising. If any exercise bothers you, stop right away.   Always warm up before exercising. Warm muscles stretch much easier than cool muscles. Stretching cool muscles can lead to injury.   Doing exercises before a meal can be a good way to get into a routine.  All topics are updated as new evidence becomes available and our peer review process is complete.  This topic retrieved from [employer] on: Apr 03, 2024.  Topic 775797 Version 2.0  Release: 32.2.4 - C32.92  © 2024 UpToDate, Inc. and/or its affiliates. All rights reserved.  figure 1: Cat-cow stretch     Start on all fours on the floor, with hands under your shoulders, knees under your hips, and your back flat. First, tuck your chin and tighten your stomach muscles as you round your back (like a \"cat\"). Hold the stretch for about 10 seconds. Rest for a few seconds as you flatten your back. Next, lift your chin and let your belly and lower back sink toward the floor (like a \"cow\"). Hold the stretch for about 10 seconds.  Graphic [id number] Version 1.0  figure 2: Single knee-to-chest stretch     Lie on your back, bend your knees, and have your feet flat on the floor. Pull 1 knee toward your chest until you feel a stretch in your lower back and buttock area. Repeat with the other knee. If you have knee problems, pull " your knee up by grabbing the back of your thigh instead of the front of your knee. You can also do this exercise by grabbing both knees at the same time.  Graphic [id number] Version 1.0  figure 3: Lower trunk rotation     While lying on your back, bend your knees and have your feet flat on the floor. Keep your legs together and then drop them to 1 side. Keep both of your shoulders touching the floor until you feel a stretch in the muscles at the side of the back. Repeat on the other side.  Graphic [id number] Version 1.0  figure 4: Lower back stretch     Sit in a chair with your feet spread about shoulder width apart. Then, lean forward until you feel a stretch in your lower back.  Graphic [id number] Version 1.0  figure 5: Shoulder blade squeezes     Pinch your shoulder blades together on your upper back and hold for 3 to 5 seconds. Make sure that you are sitting with good posture and that your shoulders do not raise up when you do this exercise. Relax.  Graphic [id number] Version 1.0  figure 6: Pelvic tilts     Lie on your back with your knees bent and feet flat on the floor. Tighten your stomach muscles and press your lower back down to the floor. Relax.  Graphic [id number] Version 1.0  figure 7: Hip lifts     Lie on your back with your knees bent and feet flat on the floor. Tighten your stomach muscles and lift your buttocks off of the floor. Relax.  Graphic [id number] Version 1.0  Consumer Information Use and Disclaimer   Disclaimer: This generalized information is a limited summary of diagnosis, treatment, and/or medication information. It is not meant to be comprehensive and should be used as a tool to help the user understand and/or assess potential diagnostic and treatment options. It does NOT include all information about conditions, treatments, medications, side effects, or risks that may apply to a specific patient. It is not intended to be medical advice or a substitute for the medical advice, diagnosis, or treatment of a health  care provider based on the health care provider's examination and assessment of a patient's specific and unique circumstances. Patients must speak with a health care provider for complete information about their health, medical questions, and treatment options, including any risks or benefits regarding use of medications. This information does not endorse any treatments or medications as safe, effective, or approved for treating a specific patient. UpToDate, Inc. and its affiliates disclaim any warranty or liability relating to this information or the use thereof.The use of this information is governed by the Terms of Use, available at https://www.[employer]/en/know/clinical-effectiveness-terms. 2024© UpToDate, Inc. and its affiliates and/or licensors. All rights reserved.  Copyright   © 2024 UpToDate, Inc. and/or its affiliates. All rights reserved.    Return in about 8 months (around 5/23/2025) for labs CC .  Subjective:    ARNOLD Modi is a 74 y.o. female who presents with:  Chief Complaint    Back Pain       HPI       Back Pain     Additional comments: lower back pain on right side for 3 days radiates to middle and left side. Pt states that the pain is triggered when she gets out of the car, when she lifts her legs/ Pt states that when she is walking, standing, and laying down denies pain. Pain rated at a 5/10. Pt has been using OTC Advil and trying to stay hydrated. Denies urinary urgency, pain upon urination, or odor.            Last edited by Olivia Jarrett LPN on 9/23/2024  9:52 AM.        ---Above per clinical staff & reviewed. ---        Today:  Severe pain on right side after bending down to put on her pants  Right low back that radiated to left   Using one pill advil twice a day   Upsets her stomach   If sitting hard to get up   Once she is up she is okay   Tried ice and heat   Rates pain right now 2/10 , 5/10     The following portions of the patient's history were reviewed and updated as  "appropriate: allergies, current medications, past family history, past medical history, past social history, past surgical history and problem list.  Review of Systems  ROS:  all others negative - no chest pain, SOB, normal urine and bowels. no GERD. sleeping well. mood good. + low back pain.   Objective:    /84 (BP Location: Left arm, Patient Position: Sitting, Cuff Size: Standard)   Pulse 82   Temp 98.2 °F (36.8 °C) (Temporal)   Resp 16   Ht 5' 3.5\" (1.613 m)   Wt 66.7 kg (147 lb)   SpO2 98%   BMI 25.63 kg/m²   Wt Readings from Last 3 Encounters:   09/23/24 66.7 kg (147 lb)   05/14/24 67.9 kg (149 lb 9.6 oz)   04/30/24 68.2 kg (150 lb 6.4 oz)     BP Readings from Last 3 Encounters:   09/23/24 142/84   06/12/24 152/70   05/14/24 132/84       Current Medications:  Current Outpatient Medications   Medication Sig Dispense Refill    Ascorbic Acid (vitamin C) 1000 MG tablet Take 12,000 mg by mouth daily      aspirin 81 mg chewable tablet Chew 81 mg daily      cetirizine (ZyrTEC) 10 mg tablet Take 10 mg by mouth daily      cholecalciferol (VITAMIN D3) 1,000 units tablet Take 1,000 Units by mouth daily      cyclobenzaprine (FLEXERIL) 5 mg tablet Take 1 tablet (5 mg total) by mouth daily at bedtime as needed for muscle spasms 10 tablet 0    hydrocortisone (ANUSOL-HC) 2.5 % rectal cream Apply topically 2 (two) times a day 84 g 5    hydrocortisone 2.5 % cream Apply topically 2 (two) times a day as needed (hemmorhoid) 28 g 1    ibuprofen (MOTRIN) 200 mg tablet Take 200 mg by mouth every 6 (six) hours as needed for mild pain      LORazepam (ATIVAN) 0.5 mg tablet TAKE 1 TABLET BY MOUTH DAILY AT  BEDTIME (Patient taking differently: Take 0.5 mg by mouth daily at bedtime) 100 tablet 0    rosuvastatin (CRESTOR) 10 MG tablet TAKE 1 TABLET BY MOUTH DAILY (Patient taking differently: Take 10 mg by mouth daily) 100 tablet 1     No current facility-administered medications for this visit.        Physical " Exam  Musculoskeletal:      Lumbar back: Tenderness present. No swelling, edema or bony tenderness. Normal range of motion. Negative right straight leg raise test and negative left straight leg raise test.        Back:         Constitutional: she appears well-developed and well-nourished.   HENT: Head: Normocephalic.   Neck: No pain on exam. Neck supple.   Cardiovascular: Normal rate, regular rhythm and normal heart sounds.    Pulmonary/Chest: Effort normal and breath sounds normal.   Abdominal: Soft. Bowel sounds are normal. There is no tenderness.   Lymphadenopathy: she has no cervical adenopathy.   Neurological: she is alert and oriented to person, place, and time.   Skin: Skin is warm and dry.   Psychiatric: she has a normal mood and affect. her behavior is normal.            Depression Screening and Follow-up Plan: Patient was screened for depression during today's encounter. They screened negative with a PHQ-2 score of 0.

## 2024-09-23 NOTE — ASSESSMENT & PLAN NOTE
Update fasting blood work prior to November appointment   Orders:    TSH, 3rd generation; Future    T4, free; Future    CBC and differential; Future    Comprehensive metabolic panel; Future    Lipid panel; Future    Vitamin D 25 hydroxy; Future

## 2024-09-24 NOTE — PATIENT COMMUNICATION
Ly called in concerned with the abnormal reading for the urine collection.   Requesting a call back to review results when available.

## 2024-10-08 ENCOUNTER — TELEPHONE (OUTPATIENT)
Age: 74
End: 2024-10-08

## 2024-10-08 NOTE — TELEPHONE ENCOUNTER
Patient called in to inquire about labs to be completed prior to AWV scheduled for 11/26 and questioned if POCT urine dip non scope was to be repeated. RN assured patient that the urine was done on 9/23 OV and was not ordered to be repeated. Patient verbalized understanding.

## 2024-10-19 DIAGNOSIS — E78.2 MIXED HYPERLIPIDEMIA: ICD-10-CM

## 2024-10-20 RX ORDER — ROSUVASTATIN CALCIUM 10 MG/1
10 TABLET, COATED ORAL DAILY
Qty: 100 TABLET | Refills: 1 | Status: SHIPPED | OUTPATIENT
Start: 2024-10-20

## 2024-10-24 ENCOUNTER — APPOINTMENT (OUTPATIENT)
Dept: LAB | Facility: CLINIC | Age: 74
End: 2024-10-24
Payer: COMMERCIAL

## 2024-10-24 DIAGNOSIS — E05.90 SUBCLINICAL HYPERTHYROIDISM: ICD-10-CM

## 2024-10-24 DIAGNOSIS — E55.9 VITAMIN D DEFICIENCY: ICD-10-CM

## 2024-10-24 DIAGNOSIS — M54.50 ACUTE RIGHT-SIDED LOW BACK PAIN WITHOUT SCIATICA: ICD-10-CM

## 2024-10-24 DIAGNOSIS — E05.90 HYPERTHYROIDISM: ICD-10-CM

## 2024-10-24 DIAGNOSIS — M54.50 LOW BACK PAIN, UNSPECIFIED BACK PAIN LATERALITY, UNSPECIFIED CHRONICITY, UNSPECIFIED WHETHER SCIATICA PRESENT: ICD-10-CM

## 2024-10-24 DIAGNOSIS — E78.2 MIXED HYPERLIPIDEMIA: ICD-10-CM

## 2024-10-24 LAB
25(OH)D3 SERPL-MCNC: 64.4 NG/ML (ref 30–100)
ALBUMIN SERPL BCG-MCNC: 4.1 G/DL (ref 3.5–5)
ALP SERPL-CCNC: 38 U/L (ref 34–104)
ALT SERPL W P-5'-P-CCNC: 15 U/L (ref 7–52)
ANION GAP SERPL CALCULATED.3IONS-SCNC: 6 MMOL/L (ref 4–13)
AST SERPL W P-5'-P-CCNC: 17 U/L (ref 13–39)
BASOPHILS # BLD AUTO: 0.04 THOUSANDS/ΜL (ref 0–0.1)
BASOPHILS NFR BLD AUTO: 1 % (ref 0–1)
BILIRUB SERPL-MCNC: 0.65 MG/DL (ref 0.2–1)
BUN SERPL-MCNC: 18 MG/DL (ref 5–25)
CALCIUM SERPL-MCNC: 8.9 MG/DL (ref 8.4–10.2)
CHLORIDE SERPL-SCNC: 104 MMOL/L (ref 96–108)
CHOLEST SERPL-MCNC: 155 MG/DL
CO2 SERPL-SCNC: 28 MMOL/L (ref 21–32)
CREAT SERPL-MCNC: 0.68 MG/DL (ref 0.6–1.3)
EOSINOPHIL # BLD AUTO: 0.1 THOUSAND/ΜL (ref 0–0.61)
EOSINOPHIL NFR BLD AUTO: 2 % (ref 0–6)
ERYTHROCYTE [DISTWIDTH] IN BLOOD BY AUTOMATED COUNT: 13.7 % (ref 11.6–15.1)
GFR SERPL CREATININE-BSD FRML MDRD: 87 ML/MIN/1.73SQ M
GLUCOSE P FAST SERPL-MCNC: 88 MG/DL (ref 65–99)
HCT VFR BLD AUTO: 41.4 % (ref 34.8–46.1)
HDLC SERPL-MCNC: 58 MG/DL
HGB BLD-MCNC: 13.1 G/DL (ref 11.5–15.4)
IMM GRANULOCYTES # BLD AUTO: 0.01 THOUSAND/UL (ref 0–0.2)
IMM GRANULOCYTES NFR BLD AUTO: 0 % (ref 0–2)
LDLC SERPL CALC-MCNC: 81 MG/DL (ref 0–100)
LYMPHOCYTES # BLD AUTO: 1.72 THOUSANDS/ΜL (ref 0.6–4.47)
LYMPHOCYTES NFR BLD AUTO: 34 % (ref 14–44)
MCH RBC QN AUTO: 30.3 PG (ref 26.8–34.3)
MCHC RBC AUTO-ENTMCNC: 31.6 G/DL (ref 31.4–37.4)
MCV RBC AUTO: 96 FL (ref 82–98)
MONOCYTES # BLD AUTO: 0.59 THOUSAND/ΜL (ref 0.17–1.22)
MONOCYTES NFR BLD AUTO: 12 % (ref 4–12)
NEUTROPHILS # BLD AUTO: 2.63 THOUSANDS/ΜL (ref 1.85–7.62)
NEUTS SEG NFR BLD AUTO: 51 % (ref 43–75)
NONHDLC SERPL-MCNC: 97 MG/DL
NRBC BLD AUTO-RTO: 0 /100 WBCS
PLATELET # BLD AUTO: 191 THOUSANDS/UL (ref 149–390)
PMV BLD AUTO: 12.3 FL (ref 8.9–12.7)
POTASSIUM SERPL-SCNC: 3.9 MMOL/L (ref 3.5–5.3)
PROT SERPL-MCNC: 6.7 G/DL (ref 6.4–8.4)
RBC # BLD AUTO: 4.32 MILLION/UL (ref 3.81–5.12)
SODIUM SERPL-SCNC: 138 MMOL/L (ref 135–147)
T4 FREE SERPL-MCNC: 0.96 NG/DL (ref 0.61–1.12)
TRIGL SERPL-MCNC: 79 MG/DL
TSH SERPL DL<=0.05 MIU/L-ACNC: 0.48 UIU/ML (ref 0.45–4.5)
WBC # BLD AUTO: 5.09 THOUSAND/UL (ref 4.31–10.16)

## 2024-10-24 PROCEDURE — 84439 ASSAY OF FREE THYROXINE: CPT

## 2024-10-24 PROCEDURE — 85025 COMPLETE CBC W/AUTO DIFF WBC: CPT

## 2024-10-24 PROCEDURE — 80061 LIPID PANEL: CPT

## 2024-10-24 PROCEDURE — 84443 ASSAY THYROID STIM HORMONE: CPT

## 2024-10-24 PROCEDURE — 80053 COMPREHEN METABOLIC PANEL: CPT

## 2024-10-24 PROCEDURE — 36415 COLL VENOUS BLD VENIPUNCTURE: CPT

## 2024-10-24 PROCEDURE — 82306 VITAMIN D 25 HYDROXY: CPT

## 2024-10-30 DIAGNOSIS — F41.9 ANXIETY: ICD-10-CM

## 2024-11-01 NOTE — TELEPHONE ENCOUNTER
Patient called to check the status of refill request, received a text from [email or web address] advising her to call to speed up the process.  Patient still has enough medication for 3 days.

## 2024-11-04 RX ORDER — LORAZEPAM 0.5 MG/1
0.5 TABLET ORAL
Qty: 100 TABLET | Refills: 1 | Status: SHIPPED | OUTPATIENT
Start: 2024-11-04

## 2024-11-04 NOTE — TELEPHONE ENCOUNTER
Patient states she only takes 1 at bedtime and has been taking it this way forever and is doing just fine on it.

## 2024-11-26 ENCOUNTER — OFFICE VISIT (OUTPATIENT)
Dept: FAMILY MEDICINE CLINIC | Facility: CLINIC | Age: 74
End: 2024-11-26
Payer: COMMERCIAL

## 2024-11-26 VITALS
WEIGHT: 146.4 LBS | TEMPERATURE: 97.5 F | HEIGHT: 64 IN | HEART RATE: 76 BPM | SYSTOLIC BLOOD PRESSURE: 132 MMHG | DIASTOLIC BLOOD PRESSURE: 80 MMHG | BODY MASS INDEX: 25 KG/M2 | OXYGEN SATURATION: 99 %

## 2024-11-26 DIAGNOSIS — Z78.0 POST-MENOPAUSE: ICD-10-CM

## 2024-11-26 DIAGNOSIS — F41.9 ANXIETY: ICD-10-CM

## 2024-11-26 DIAGNOSIS — Z00.00 ENCOUNTER FOR MEDICARE ANNUAL WELLNESS EXAM: Primary | ICD-10-CM

## 2024-11-26 DIAGNOSIS — E05.90 SUBCLINICAL HYPERTHYROIDISM: ICD-10-CM

## 2024-11-26 DIAGNOSIS — Z71.89 COUNSELING REGARDING ADVANCED DIRECTIVES: ICD-10-CM

## 2024-11-26 DIAGNOSIS — E78.2 MIXED HYPERLIPIDEMIA: ICD-10-CM

## 2024-11-26 PROCEDURE — G0439 PPPS, SUBSEQ VISIT: HCPCS | Performed by: FAMILY MEDICINE

## 2024-11-26 PROCEDURE — 99214 OFFICE O/P EST MOD 30 MIN: CPT | Performed by: FAMILY MEDICINE

## 2024-11-26 NOTE — PROGRESS NOTES
Name: Ly Richardson      : 1950      MRN: [id number]  Encounter Provider: Dahiana Salvador DO  Encounter Date: 2024   Encounter department: [facility]    Assessment & Plan  Encounter for Medicare annual wellness exam         Counseling regarding advanced directives            Preventive health issues were discussed with patient, and age appropriate screening tests were ordered as noted in patient's After Visit Summary. Personalized health advice and appropriate referrals for health education or preventive services given if needed, as noted in patient's After Visit Summary.    History of Present Illness     HPI   Patient Care Team:  Dahiana Salvador DO as PCP - General (Family Medicine)  Dahiana Salvador DO as PCP - [facility]CP-[facility] ([facility])  Dahiana Salvador DO (Family Medicine)  Thao Seaman MD (Endocrinology)    Review of Systems  Medical History Reviewed by provider this encounter:  Tobacco  Allergies  Meds  Problems  Med Hx  Surg Hx  Fam Hx       Annual Wellness Visit Questionnaire   Ly is here for her Subsequent Wellness visit.     Health Risk Assessment:   Patient rates overall health as excellent. Patient feels that their physical health rating is same. Patient is very satisfied with their life. Eyesight was rated as same. Hearing was rated as slightly worse. Patient feels that their emotional and mental health rating is same. Patients states they are sometimes angry. Patient states they are never, rarely unusually tired/fatigued. Pain experienced in the last 7 days has been none. Patient states that she has experienced no weight loss or gain in last 6 months.     Fall Risk Screening:   In the past year, patient has experienced: no history of falling in past year      Urinary Incontinence Screening:   Patient has not leaked urine accidently in the last six months.     Home Safety:  Patient does not have trouble with stairs inside or outside of their  home. Patient has working smoke alarms and has working carbon monoxide detector. Home safety hazards include: none.     Nutrition:   Current diet is Regular.     Medications:   Patient is currently taking over-the-counter supplements. OTC medications include: see medication list. Patient is not able to manage medications.     Activities of Daily Living (ADLs)/Instrumental Activities of Daily Living (IADLs):   Walk and transfer into and out of bed and chair?: Yes  Dress and groom yourself?: Yes    Bathe or shower yourself?: Yes    Feed yourself? Yes  Do your laundry/housekeeping?: Yes  Manage your money, pay your bills and track your expenses?: Yes  Make your own meals?: Yes    Do your own shopping?: Yes    Previous Hospitalizations:   Any hospitalizations or ED visits within the last 12 months?: No      Advance Care Planning:   Living will: Yes    Advanced directive: Yes    Advanced directive counseling given: Yes    End of Life Decisions reviewed with patient: Yes      Comments: Has living will at home. DNR.  Beny is JODYA. Hayde[place]     Cognitive Screening:   Provider or family/friend/caregiver concerned regarding cognition?: No    PREVENTIVE SCREENINGS      Cardiovascular Screening:    General: Screening Not Indicated and History Lipid Disorder      Diabetes Screening:     General: Screening Current      Colorectal Cancer Screening:     General: Screening Current      Breast Cancer Screening:     General: Screening Current      Cervical Cancer Screening:    General: Screening Not Indicated      Osteoporosis Screening:    General: Screening Current      Lung Cancer Screening:     General: Screening Not Indicated      Hepatitis C Screening:    General: Screening Current    Screening, Brief Intervention, and Referral to Treatment (SBIRT)    Screening  Typical number of drinks in a day: 0  Typical number of drinks in a week: 1  Interpretation: Low risk drinking behavior.    Single Item Drug Screening:  How often  "have you used an illegal drug (including marijuana) or a prescription medication for non-medical reasons in the past year? never    Single Item Drug Screen Score: 0  Interpretation: Negative screen for possible drug use disorder    Social Drivers of Health     Financial Resource Strain: Low Risk  (11/7/2023)    Overall Financial Resource Strain (CARDIA)     Difficulty of Paying Living Expenses: Not hard at all   Transportation Needs: No Transportation Needs (11/7/2023)    PRAPARE - Transportation     Lack of Transportation (Medical): No     Lack of Transportation (Non-Medical): No     No results found.    Objective   /72 (Patient Position: Sitting, Cuff Size: Standard)   Pulse 76   Temp 97.5 °F (36.4 °C) (Temporal)   Ht 5' 3.5\" (1.613 m)   Wt 66.4 kg (146 lb 6.4 oz)   SpO2 99%   BMI 25.53 kg/m²     Physical Exam    "

## 2024-11-26 NOTE — PATIENT INSTRUCTIONS
Tdap 10/24/2019 up to date   Medicare Preventive Visit Patient Instructions  Thank you for completing your Welcome to Medicare Visit or Medicare Annual Wellness Visit today. Your next wellness visit will be due in one year (11/27/2025).  The screening/preventive services that you may require over the next 5-10 years are detailed below. Some tests may not apply to you based off risk factors and/or age. Screening tests ordered at today's visit but not completed yet may show as past due. Also, please note that scanned in results may not display below.  Preventive Screenings:  Service Recommendations Previous Testing/Comments   Colorectal Cancer Screening  * Colonoscopy    * Fecal Occult Blood Test (FOBT)/Fecal Immunochemical Test (FIT)  * Fecal DNA/Cologuard Test  * Flexible Sigmoidoscopy Age: 45-75 years old   Colonoscopy: every 10 years (may be performed more frequently if at higher risk)  OR  FOBT/FIT: every 1 year  OR  Cologuard: every 3 years  OR  Sigmoidoscopy: every 5 years  Screening may be recommended earlier than age 45 if at higher risk for colorectal cancer. Also, an individualized decision between you and your healthcare provider will decide whether screening between the ages of 76-85 would be appropriate. Colonoscopy: 06/18/2019  FOBT/FIT: Not on file  Cologuard: Not on file  Sigmoidoscopy: Not on file    Screening Current     Breast Cancer Screening Age: 40+ years old  Frequency: every 1-2 years  Not required if history of left and right mastectomy Mammogram: 05/04/2023    Screening Current   Cervical Cancer Screening Between the ages of 21-29, pap smear recommended once every 3 years.   Between the ages of 30-65, can perform pap smear with HPV co-testing every 5 years.   Recommendations may differ for women with a history of total hysterectomy, cervical cancer, or abnormal pap smears in past. Pap Smear: Not on file    Screening Not Indicated   Hepatitis C Screening Once for adults born between 1945 and  1965  More frequently in patients at high risk for Hepatitis C Hep C Antibody: 07/08/2019    Screening Current   Diabetes Screening 1-2 times per year if you're at risk for diabetes or have pre-diabetes Fasting glucose: 88 mg/dL (10/24/2024)  A1C: No results in last 5 years (No results in last 5 years)  Screening Current   Cholesterol Screening Once every 5 years if you don't have a lipid disorder. May order more often based on risk factors. Lipid panel: 10/24/2024    Screening Not Indicated  History Lipid Disorder     Other Preventive Screenings Covered by Medicare:  Abdominal Aortic Aneurysm (AAA) Screening: covered once if your at risk. You're considered to be at risk if you have a family history of AAA.  Lung Cancer Screening: covers low dose CT scan once per year if you meet all of the following conditions: (1) Age 55-77; (2) No signs or symptoms of lung cancer; (3) Current smoker or have quit smoking within the last 15 years; (4) You have a tobacco smoking history of at least 20 pack years (packs per day multiplied by number of years you smoked); (5) You get a written order from a healthcare provider.  Glaucoma Screening: covered annually if you're considered high risk: (1) You have diabetes OR (2) Family history of glaucoma OR (3)  aged 50 and older OR (4)  American aged 65 and older  Osteoporosis Screening: covered every 2 years if you meet one of the following conditions: (1) You're estrogen deficient and at risk for osteoporosis based off medical history and other findings; (2) Have a vertebral abnormality; (3) On glucocorticoid therapy for more than 3 months; (4) Have primary hyperparathyroidism; (5) On osteoporosis medications and need to assess response to drug therapy.   Last bone density test (DXA Scan): 11/09/2022.  HIV Screening: covered annually if you're between the age of 15-65. Also covered annually if you are younger than 15 and older than 65 with risk factors for HIV  infection. For pregnant patients, it is covered up to 3 times per pregnancy.    Immunizations:  Immunization Recommendations   Influenza Vaccine Annual influenza vaccination during flu season is recommended for all persons aged >= 6 months who do not have contraindications   Pneumococcal Vaccine   * Pneumococcal conjugate vaccine = PCV13 (Prevnar 13), PCV15 (Vaxneuvance), PCV20 (Prevnar 20)  * Pneumococcal polysaccharide vaccine = PPSV23 (Pneumovax) Adults 19-65 yo with certain risk factors or if 65+ yo  If never received any pneumonia vaccine: recommend Prevnar 20 (PCV20)  Give PCV20 if previously received 1 dose of PCV13 or PPSV23   Hepatitis B Vaccine 3 dose series if at intermediate or high risk (ex: diabetes, end stage renal disease, liver disease)   Respiratory syncytial virus (RSV) Vaccine - COVERED BY MEDICARE PART D  * RSVPreF3 (Arexvy) CDC recommends that adults 60 years of age and older may receive a single dose of RSV vaccine using shared clinical decision-making (SCDM)   Tetanus (Td) Vaccine - COST NOT COVERED BY MEDICARE PART B Following completion of primary series, a booster dose should be given every 10 years to maintain immunity against tetanus. Td may also be given as tetanus wound prophylaxis.   Tdap Vaccine - COST NOT COVERED BY MEDICARE PART B Recommended at least once for all adults. For pregnant patients, recommended with each pregnancy.   Shingles Vaccine (Shingrix) - COST NOT COVERED BY MEDICARE PART B  2 shot series recommended in those 19 years and older who have or will have weakened immune systems or those 50 years and older     Health Maintenance Due:      Topic Date Due    DXA SCAN  11/08/2024    Breast Cancer Screening: Mammogram  05/04/2025    Colorectal Cancer Screening  06/18/2029    Hepatitis C Screening  Completed     Immunizations Due:      Topic Date Due    COVID-19 Vaccine (7 - 2024-25 season) 09/01/2024     Advance Directives   What are advance directives?  Advance  directives are legal documents that state your wishes and plans for medical care. These plans are made ahead of time in case you lose your ability to make decisions for yourself. Advance directives can apply to any medical decision, such as the treatments you want, and if you want to donate organs.   What are the types of advance directives?  There are many types of advance directives, and each state has rules about how to use them. You may choose a combination of any of the following:  Living will:  This is a written record of the treatment you want. You can also choose which treatments you do not want, which to limit, and which to stop at a certain time. This includes surgery, medicine, IV fluid, and tube feedings.   Durable power of  for healthcare (DPAHC):  This is a written record that states who you want to make healthcare choices for you when you are unable to make them for yourself. This person, called a proxy, is usually a family member or a friend. You may choose more than 1 proxy.  Do not resuscitate (DNR) order:  A DNR order is used in case your heart stops beating or you stop breathing. It is a request not to have certain forms of treatment, such as CPR. A DNR order may be included in other types of advance directives.  Medical directive:  This covers the care that you want if you are in a coma, near death, or unable to make decisions for yourself. You can list the treatments you want for each condition. Treatment may include pain medicine, surgery, blood transfusions, dialysis, IV or tube feedings, and a ventilator (breathing machine).  Values history:  This document has questions about your views, beliefs, and how you feel and think about life. This information can help others choose the care that you would choose.  Why are advance directives important?  An advance directive helps you control your care. Although spoken wishes may be used, it is better to have your wishes written down. Spoken  wishes can be misunderstood, or not followed. Treatments may be given even if you do not want them. An advance directive may make it easier for your family to make difficult choices about your care.   Urinary Incontinence   Urinary incontinence (UI)  is when you lose control of your bladder. UI develops because your bladder cannot store or empty urine properly. The 3 most common types of UI are stress incontinence, urge incontinence, or both.  Medicines:   May be given to help strengthen your bladder control. Report any side effects of medication to your healthcare provider.  Do pelvic muscle exercises often:  Your pelvic muscles help you stop urinating. Squeeze these muscles tight for 5 seconds, then relax for 5 seconds. Gradually work up to squeezing for 10 seconds. Do 3 sets of 15 repetitions a day, or as directed. This will help strengthen your pelvic muscles and improve bladder control.  Train your bladder:  Go to the bathroom at set times, such as every 2 hours, even if you do not feel the urge to go. You can also try to hold your urine when you feel the urge to go. For example, hold your urine for 5 minutes when you feel the urge to go. As that becomes easier, hold your urine for 10 minutes.   Self-care:   Keep a UI record.  Write down how often you leak urine and how much you leak. Make a note of what you were doing when you leaked urine.  Drink liquids as directed. You may need to limit the amount of liquid you drink to help control your urine leakage. Do not drink any liquid right before you go to bed. Limit or do not have drinks that contain caffeine or alcohol.   Prevent constipation.  Eat a variety of high-fiber foods. Good examples are high-fiber cereals, beans, vegetables, and whole-grain breads. Walking is the best way to trigger your intestines to have a bowel movement.  Exercise regularly and maintain a healthy weight.  Weight loss and exercise will decrease pressure on your bladder and help you  control your leakage.   Use a catheter as directed  to help empty your bladder. A catheter is a tiny, plastic tube that is put into your bladder to drain your urine.   Go to behavior therapy as directed.  Behavior therapy may be used to help you learn to control your urge to urinate.    Weight Management   Why it is important to manage your weight:  Being overweight increases your risk of health conditions such as heart disease, high blood pressure, type 2 diabetes, and certain types of cancer. It can also increase your risk for osteoarthritis, sleep apnea, and other respiratory problems. Aim for a slow, steady weight loss. Even a small amount of weight loss can lower your risk of health problems.  How to lose weight safely:  A safe and healthy way to lose weight is to eat fewer calories and get regular exercise. You can lose up about 1 pound a week by decreasing the number of calories you eat by 500 calories each day.   Healthy meal plan for weight management:  A healthy meal plan includes a variety of foods, contains fewer calories, and helps you stay healthy. A healthy meal plan includes the following:  Eat whole-grain foods more often.  A healthy meal plan should contain fiber. Fiber is the part of grains, fruits, and vegetables that is not broken down by your body. Whole-grain foods are healthy and provide extra fiber in your diet. Some examples of whole-grain foods are whole-wheat breads and pastas, oatmeal, brown rice, and bulgur.  Eat a variety of vegetables every day.  Include dark, leafy greens such as spinach, kale, leslie greens, and mustard greens. Eat yellow and orange vegetables such as carrots, sweet potatoes, and winter squash.   Eat a variety of fruits every day.  Choose fresh or canned fruit (canned in its own juice or light syrup) instead of juice. Fruit juice has very little or no fiber.  Eat low-fat dairy foods.  Drink fat-free (skim) milk or 1% milk. Eat fat-free yogurt and low-fat cottage  cheese. Try low-fat cheeses such as mozzarella and other reduced-fat cheeses.  Choose meat and other protein foods that are low in fat.  Choose beans or other legumes such as split peas or lentils. Choose fish, skinless poultry (chicken or turkey), or lean cuts of red meat (beef or pork). Before you cook meat or poultry, cut off any visible fat.   Use less fat and oil.  Try baking foods instead of frying them. Add less fat, such as margarine, sour cream, regular salad dressing and mayonnaise to foods. Eat fewer high-fat foods. Some examples of high-fat foods include french fries, doughnuts, ice cream, and cakes.  Eat fewer sweets.  Limit foods and drinks that are high in sugar. This includes candy, cookies, regular soda, and sweetened drinks.  Exercise:  Exercise at least 30 minutes per day on most days of the week. Some examples of exercise include walking, biking, dancing, and swimming. You can also fit in more physical activity by taking the stairs instead of the elevator or parking farther away from stores. Ask your healthcare provider about the best exercise plan for you.      © Copyright [employer] 2018 Information is for End User's use only and may not be sold, redistributed or otherwise used for commercial purposes. All illustrations and images included in CareNotes® are the copyrighted property of [employer] Inc[employer] or [employer]

## 2024-11-26 NOTE — PROGRESS NOTES
Assessment & Plan  Encounter for Medicare annual wellness exam  See other note  Brought in her advanced directive today - confirmed DNR       Counseling regarding advanced directives  Brought in her advanced directive today - confirmed DNR       Subclinical hyperthyroidism  Well cotnrolled        Mixed hyperlipidemia  Well controlled on Crestor 10 mg        Anxiety  Using ativan as needed        Post-menopause  Osteopenia on last scan. Update dexa   Orders:    DXA bone density spine hip and pelvis; Future         Return in about 6 months (around 5/26/2025).    Subjective:   Ly is a 74 y.o. female here today for a follow-up on her current medical conditions:    Patient Active Problem List   Diagnosis    Multiple thyroid nodules    Alternating constipation and diarrhea    Anxiety    Subclinical hyperthyroidism    Mixed hyperlipidemia    Thrombocytopenia (HCC)    Hemorrhoids    Persistent insomnia    Psoriasis    Counseling regarding advanced directives         Patient Care Team:  Dahiana Salvador DO as PCP - General (Family Medicine)  Dahiana Salvador DO as PCP - [facility]  Dahiana Salvador DO (Family Medicine)  Thao Seaman MD (Endocrinology)    Current Medications:  Current Outpatient Medications   Medication Sig Dispense Refill    Ascorbic Acid (vitamin C) 1000 MG tablet Take 12,000 mg by mouth daily (Patient taking differently: Take 12,000 mg by mouth daily PRN)      aspirin 81 mg chewable tablet Chew 81 mg daily      cetirizine (ZyrTEC) 10 mg tablet Take 10 mg by mouth daily      cholecalciferol (VITAMIN D3) 1,000 units tablet Take 1,000 Units by mouth daily      hydrocortisone (ANUSOL-HC) 2.5 % rectal cream Apply topically 2 (two) times a day 84 g 5    hydrocortisone 2.5 % cream Apply topically 2 (two) times a day as needed (hemmorhoid) 28 g 1    ibuprofen (MOTRIN) 200 mg tablet Take 200 mg by mouth every 6 (six) hours as needed for mild pain      LORazepam (ATIVAN) 0.5 mg tablet TAKE 1  "TABLET BY MOUTH AT  BEDTIME 100 tablet 1    rosuvastatin (CRESTOR) 10 MG tablet TAKE 1 TABLET BY MOUTH DAILY 100 tablet 1     No current facility-administered medications for this visit.       HPI:  Chief Complaint   Patient presents with    Medicare Wellness Visit     Pt wants to review labs done on 09/23/24, pt wants to know if drinking sparkling and mineral water is okay, and would like to have her BP checked with her machine.     -- Above per clinical staff and reviewed. --    PHQ-2/9 Depression Screening                Oct 2024 labs vit D 64, TSH   Cmp lipids cbc normal   - bring home cuff in for calibration - f/u in 6 months with labs   Prefers visit Q 6 months   thyrotropin-receptor antibodies TRAb or (or thyroid-stimulating immunoglobulin [TSI]) negative/normal.   HOMERO Seaman watching thyroid levels and US   cmp, lipids, vit D? CBC, TSH    due for mammo. 5/4/24  [facility] 11/7/24 Nov 2022 labs TSH 0.543, CMP normal. chol 167, TG 82, HDL 55, LDL 96, H/H 13.0/41.3, plaletets 195   Advanced directive -  Beny FRANKEL   Today:  Zyrtec daily   Doing well   Now doing vit 1000     The following portions of the patient's history were reviewed and updated as appropriate: allergies, current medications, past family history, past medical history, past social history, past surgical history and problem list.    Objective:  Vitals:  /80 (Patient Position: Sitting, Cuff Size: Standard) Comment (Cuff Size): BROUGHT HER MACHINE FROM HOME  Pulse 76   Temp 97.5 °F (36.4 °C) (Temporal)   Ht 5' 3.5\" (1.613 m)   Wt 66.4 kg (146 lb 6.4 oz)   SpO2 99%   BMI 25.53 kg/m²    Wt Readings from Last 3 Encounters:   11/26/24 66.4 kg (146 lb 6.4 oz)   09/23/24 66.7 kg (147 lb)   05/14/24 67.9 kg (149 lb 9.6 oz)      BP Readings from Last 3 Encounters:   11/26/24 132/80   09/23/24 142/84   06/12/24 152/70        Review of Systems   She has no other concerns. No unexpected weight changes. No chest pain, SOB, or " palpitations. No GERD. No changes in bowels or bladder. Sleeping well. some mood changes.     Physical Exam   Constitutional:  she appears well-developed and well-nourished.  HENT: Head: Normocephalic.   Neck: Neck supple.   Cardiovascular: Normal rate, regular rhythm and normal heart sounds.   Pulmonary/Chest: Effort normal and breath sounds normal. No wheezes, rales, or rhonchi.   Abdominal: Soft. Bowel sounds are normal. There is no tenderness. No hepatosplenomegaly.   Musculoskeletal: she exhibits no edema.   Lymphadenopathy: she has no cervical adenopathy.   Neurological: she is alert and oriented to person, place, and time.   Skin: Skin is warm and dry.   Psychiatric: she has a normal mood and affect. her behavior is normal. Thought content normal.

## 2025-01-02 ENCOUNTER — OFFICE VISIT (OUTPATIENT)
Dept: FAMILY MEDICINE CLINIC | Facility: CLINIC | Age: 75
End: 2025-01-02
Payer: COMMERCIAL

## 2025-01-02 ENCOUNTER — TELEPHONE (OUTPATIENT)
Dept: FAMILY MEDICINE CLINIC | Facility: CLINIC | Age: 75
End: 2025-01-02

## 2025-01-02 VITALS
OXYGEN SATURATION: 100 % | RESPIRATION RATE: 16 BRPM | HEIGHT: 64 IN | WEIGHT: 145.8 LBS | SYSTOLIC BLOOD PRESSURE: 136 MMHG | TEMPERATURE: 98.1 F | BODY MASS INDEX: 24.89 KG/M2 | DIASTOLIC BLOOD PRESSURE: 62 MMHG | HEART RATE: 71 BPM

## 2025-01-02 DIAGNOSIS — R07.9 CHEST PAIN, UNSPECIFIED TYPE: Primary | ICD-10-CM

## 2025-01-02 DIAGNOSIS — K59.00 CONSTIPATION, UNSPECIFIED CONSTIPATION TYPE: ICD-10-CM

## 2025-01-02 DIAGNOSIS — K21.9 GASTROESOPHAGEAL REFLUX DISEASE, UNSPECIFIED WHETHER ESOPHAGITIS PRESENT: ICD-10-CM

## 2025-01-02 PROCEDURE — 93000 ELECTROCARDIOGRAM COMPLETE: CPT | Performed by: FAMILY MEDICINE

## 2025-01-02 PROCEDURE — 99214 OFFICE O/P EST MOD 30 MIN: CPT | Performed by: FAMILY MEDICINE

## 2025-01-02 RX ORDER — FAMOTIDINE 20 MG/1
20 TABLET, FILM COATED ORAL 2 TIMES DAILY PRN
Qty: 60 TABLET | Refills: 1 | Status: SHIPPED | OUTPATIENT
Start: 2025-01-02 | End: 2025-12-28

## 2025-01-02 NOTE — TELEPHONE ENCOUNTER
Pt. Has an appointment in the office at 9:20 am for a complaint of chest pain. Contacted patient to triage chest pain complaint: 74 Y.O female with onset of chest pain left breast, upon waking on 12/29/24 intermittent chest pain. Does not occur at relaxation, but when she is active and moving around. Pt states it is described as discomfort, denies tightness or pressure. Denies SOB, weakness, or dizziness. Denies N/V, pain in neck, shoulder, jaw, back, or arms. Does not notice any heart palpitations or fluttering. Pt states that she has been having elevated BP more recently. No pain, swelling, redness, or warmth in leg. Pt is on Ativan. Pt took most recent dose yesterday am and stated that the discomfort subsided afterward. Recommended pt to keep her appointment to be evaluated with us a 9:20 am. Informed pt that if her symptoms worsen, advised her to seek more urgent care. Pt verbalized understanding.

## 2025-01-02 NOTE — PROGRESS NOTES
Assessment & Plan      Assessment & Plan  Chest pain, unspecified type  Intermittent left chest pressure  - Symptoms not typical of cardiac chest pain- nonexertional, brief, resolves when distracted, improved w/ lorazepam.  - EKG NSR, no ST/T changes, no ectopy  - Stress test ordered to rule out cardiac etiology  - Advised to discontinue vitamin C due to potential acid reflux exacerbation  - Prescribed Pepcid 20 mg twice daily for 1 month, then as needed  -has ativan to use prn.   - Advised to seek immediate medical attention if persistent chest pain, shortness of breath, or lightheadedness occurs  Orders:  •  POCT ECG  •  Stress test only, exercise; Future    Gastroesophageal reflux disease, unspecified whether esophagitis present  Acid reflux  - Symptoms include belching and gas, possibly contributing to chest discomfort  - Advised to discontinue vitamin C  - Prescribed Pepcid 20 mg twice daily for 1 month, then as needed  - Instructed to monitor and report symptom changes  Orders:  •  famotidine (PEPCID) 20 mg tablet; Take 1 tablet (20 mg total) by mouth 2 (two) times a day as needed for heartburn or indigestion    Constipation, unspecified constipation type  Constipation  - Ongoing issues managed with nightly magnesium  - Advised to continue regimen and monitor symptoms                Subjective:     Ly is a 74 y.o. female here today and has the below chronic conditions:    Patient Active Problem List   Diagnosis   • Multiple thyroid nodules   • Alternating constipation and diarrhea   • Anxiety   • Subclinical hyperthyroidism   • Mixed hyperlipidemia   • Thrombocytopenia (HCC)   • Hemorrhoids   • Persistent insomnia   • Psoriasis   • Counseling regarding advanced directives     Current Outpatient Medications   Medication Sig Dispense Refill   • aspirin 81 mg chewable tablet Chew 81 mg daily     • cetirizine (ZyrTEC) 10 mg tablet Take 10 mg by mouth daily     • cholecalciferol (VITAMIN D3) 1,000 units  "tablet Take 1,000 Units by mouth daily     • famotidine (PEPCID) 20 mg tablet Take 1 tablet (20 mg total) by mouth 2 (two) times a day as needed for heartburn or indigestion 60 tablet 1   • hydrocortisone 2.5 % cream Apply topically 2 (two) times a day as needed (hemmorhoid) 28 g 1   • ibuprofen (MOTRIN) 200 mg tablet Take 200 mg by mouth every 6 (six) hours as needed for mild pain     • LORazepam (ATIVAN) 0.5 mg tablet TAKE 1 TABLET BY MOUTH AT  BEDTIME 100 tablet 1   • rosuvastatin (CRESTOR) 10 MG tablet TAKE 1 TABLET BY MOUTH DAILY 100 tablet 1     No current facility-administered medications for this visit.          Chief Complaint   Patient presents with   • Breast Pain     Pt c/o L breast pain for the last 3-4 days. Pt describes the pain as an intermittent \"gnawing\" pain. Not currently using anything for pain because it is \"not bothersome enough.\" Has lorazepam prn and states she took one yesterday morning and was not feeling the pain at all. Anxiety is increased. Breathing is not affected.      HPI:  - CC above per clinical staff and reviewed.    History of Present Illness  The patient is a 74-year-old female presenting with chest pain.    Chest Pain  - First noticed an unusual left chest sensation on Sunday morning, described as an annoyance rather than pain.  Describes \"gnawing\" feeling in left chest, lower.    -Last seconds  - Intermittent, brief, and does not affect sleep or daily activities  - Not worse with exertion  - Feels it intermittently during our discussion today  - No associated shortness of breath, lightheadedness, or dizziness  - Exercises on a treadmill and prefers outdoor walking  - Lorazepam has improved her daytime symptoms yesterday.  - Some increased anxiety lately.  Daughter due with baby in 2 weeks!    Gastrointestinal Issues  - Reports frequent belching and flatulence  - History of acid reflux, recently problematic  - No dietary changes, but consumed orange juice before bedtime on two " "consecutive nights this weekend.  - Occasional difficulty swallowing, less than three times per year- foot sticking sensation.    Constipation  - Long-standing constipation managed with nightly magnesium, beneficial for rest and bowel movements    Taking vitamin C supplement.    The following portions of the patient's history were reviewed and updated as appropriate: allergies, current medications, past family history, past medical history, past social history, past surgical history and problem list.    ROS:  Review of Systems   As noted above.    Objective:      /62   Pulse 71   Temp 98.1 °F (36.7 °C) (Temporal)   Resp 16   Ht 5' 3.5\" (1.613 m)   Wt 66.1 kg (145 lb 12.8 oz)   SpO2 100%   BMI 25.42 kg/m²   BP Readings from Last 3 Encounters:   01/02/25 136/62   11/26/24 132/80   09/23/24 142/84     Wt Readings from Last 3 Encounters:   01/02/25 66.1 kg (145 lb 12.8 oz)   11/26/24 66.4 kg (146 lb 6.4 oz)   09/23/24 66.7 kg (147 lb)               Physical Exam:   Physical Exam  Physical Exam  Vitals and nursing note reviewed.   Constitutional:       Appearance: Normal appearance. Well-developed, not ill-appearing.  HENT:      Head: Normocephalic and atraumatic.     Mouth: Mucous membranes are moist.  Neck:      Vascular: No carotid bruit.  Cardiovascular:      Heart sounds: RRR. No murmur heard.  Pulmonary:      Effort: Pulmonary effort is normal. No respiratory distress.     Breath sounds: Normal breath sounds. No wheezing.  Abdominal:      Palpation: Abdomen is soft.     Tenderness: There is no abdominal tenderness. There is no guarding or rebound.  Musculoskeletal:      Neck: : Neck supple.     Right lower leg: No edema.     Left lower leg: No edema.  Lymphadenopathy:      Cervical: No cervical adenopathy.  Skin:     General: Skin is warm and dry.  Neurological:      Mental Status: Alert and oriented.     Gait: Gait normal.  Psychiatric:         Mood and Affect: mild anxiety         This office visit " note was generated in part with the use of [employer] and/or voice recognition dictation.

## 2025-01-03 ENCOUNTER — TELEPHONE (OUTPATIENT)
Age: 75
End: 2025-01-03

## 2025-01-03 NOTE — TELEPHONE ENCOUNTER
Patient wanted to know the number to call to schedule stress test, provided [phone].  No further action

## 2025-01-03 NOTE — TELEPHONE ENCOUNTER
Patient called in to schedule a follow up visit with Dr. Salvador after her stress test to go over the results. Patient is scheduled for 1/7 for stress test and scheduled a follow up with Dr. Salvador on 1/10.   Patient asked if this is okay.    Please be aware, thank you

## 2025-01-07 ENCOUNTER — TELEPHONE (OUTPATIENT)
Dept: FAMILY MEDICINE CLINIC | Facility: CLINIC | Age: 75
End: 2025-01-07

## 2025-01-07 ENCOUNTER — HOSPITAL ENCOUNTER (OUTPATIENT)
Dept: NON INVASIVE DIAGNOSTICS | Facility: CLINIC | Age: 75
Discharge: HOME | End: 2025-01-07
Payer: COMMERCIAL

## 2025-01-07 VITALS
DIASTOLIC BLOOD PRESSURE: 68 MMHG | HEART RATE: 71 BPM | BODY MASS INDEX: 25.69 KG/M2 | SYSTOLIC BLOOD PRESSURE: 122 MMHG | HEIGHT: 63 IN | WEIGHT: 145 LBS

## 2025-01-07 DIAGNOSIS — R94.31 EKG ABNORMALITIES: ICD-10-CM

## 2025-01-07 DIAGNOSIS — R07.9 CHEST PAIN, UNSPECIFIED TYPE: Primary | ICD-10-CM

## 2025-01-07 DIAGNOSIS — R07.9 CHEST PAIN, UNSPECIFIED TYPE: ICD-10-CM

## 2025-01-07 LAB
STRESS BASELINE BP: NORMAL MMHG
STRESS BASELINE HR: 71 BPM

## 2025-01-07 PROCEDURE — 93018 CV STRESS TEST I&R ONLY: CPT | Performed by: INTERNAL MEDICINE

## 2025-01-07 PROCEDURE — 93016 CV STRESS TEST SUPVJ ONLY: CPT | Performed by: INTERNAL MEDICINE

## 2025-01-07 NOTE — TELEPHONE ENCOUNTER
This test has to be read by a cardiologist and this may take more than a few days.  I do not recommend an appointment with me. If it is normal I will tell her that. If it is not normal I will let her know but will then refer her to a cardiologist.

## 2025-01-07 NOTE — TELEPHONE ENCOUNTER
Patient called and states she was unable to complete the stress test today that was ordered by Dr. Ferraro because of an abnormal EKG.    Patient states she needs a new order for a stress test and echo per cardiology. Patient states cardiology should be reaching out requesting this as well from PCP office. Patient advised Dr. Salvador is out of the office today. Patient would like message sent to Dr. Ferraro since last visit was with Dr. Ferraro. Patient requesting call back.

## 2025-01-08 ENCOUNTER — TELEPHONE (OUTPATIENT)
Dept: FAMILY MEDICINE CLINIC | Facility: CLINIC | Age: 75
End: 2025-01-08

## 2025-01-08 ENCOUNTER — OFFICE VISIT (OUTPATIENT)
Dept: FAMILY MEDICINE CLINIC | Facility: CLINIC | Age: 75
End: 2025-01-08
Payer: COMMERCIAL

## 2025-01-08 VITALS
HEART RATE: 74 BPM | DIASTOLIC BLOOD PRESSURE: 70 MMHG | TEMPERATURE: 97 F | OXYGEN SATURATION: 99 % | BODY MASS INDEX: 25.16 KG/M2 | HEIGHT: 63 IN | SYSTOLIC BLOOD PRESSURE: 124 MMHG | RESPIRATION RATE: 16 BRPM | WEIGHT: 142 LBS

## 2025-01-08 DIAGNOSIS — R07.9 CHEST PAIN, UNSPECIFIED TYPE: Primary | ICD-10-CM

## 2025-01-08 LAB
MAX DIASTOLIC BP: 68 MMHG
MAX PREDICTED HEART RATE: 147 BPM
PROTOCOL NAME: NORMAL
REASON FOR TERMINATION: NORMAL
STRESS POST EXERCISE DUR MIN: 0 MIN
STRESS POST EXERCISE DUR SEC: 0 SEC
STRESS POST PEAK HR: 93 BPM
STRESS POST PEAK SYSTOLIC BP: 122 MMHG
TARGET HR FORMULA: NORMAL
TEST INDICATION: NORMAL

## 2025-01-08 PROCEDURE — 99214 OFFICE O/P EST MOD 30 MIN: CPT | Performed by: FAMILY MEDICINE

## 2025-01-08 NOTE — TELEPHONE ENCOUNTER
I know she is very worried.  The reason she is getting the stress test is that she was having occasional chest pain that she came to the office for on 1/2/25.  It doesn't sound like heart pain, but important to do the test to be sure.  Because of how her EKG looks they want to be sure to do some pictures/imaging with the stress test now so that they will be able to interpret the test easier.  It doesn't mean that those changes are concerning, but it makes it harder to interpret the test, so she needs the nuclear test.   Ok to cancel appointment as nothing different to do and she has the test set up.

## 2025-01-08 NOTE — TELEPHONE ENCOUNTER
Patient wanted to know why she had to get stress test. I explained there was a non-specific change to her EKG so the wanted to change the test to see why there was a change. Ly understood this.  She said no SOB and the chest pain is the same as when she was seen on 01/02. She is very anxious about her health and her daughter is having a baby.

## 2025-01-08 NOTE — TELEPHONE ENCOUNTER
"Pt in for appointment today for \"questions on today's EKG\" but likely does not need appointment.    She was scheduled for a stress test yesterday and was found to have nonspecific changes in her EKG but no symptoms-- so she has to switch to a stress test with imaging which has been ordered.   As long as she is not having symptoms- what we need to do is get the stress test which will help determine why she has these changes.    Pt cancelled her appointment with PCP on 1/10/25- she should reschedule w/ PCP.    She should proceed w/ the stress test scheduled for Monday.    If she has symptoms of CP or SOB- she needs an ER eval.     "

## 2025-01-10 ENCOUNTER — TELEPHONE (OUTPATIENT)
Age: 75
End: 2025-01-10

## 2025-01-10 NOTE — TELEPHONE ENCOUNTER
Anxiety will not interfere with the results. It looks at the blood flow response to her heart. She can take an Ativan prior to the test if she wishes. She can also take one with her if needed.

## 2025-01-10 NOTE — TELEPHONE ENCOUNTER
Patient called in prior to Nuclear stress test on Monday 1/13 and has concerns that her anxiety will interfere with results. Please follow up with patient for provider response to advise patient if anxiety will or not. Thank you.

## 2025-01-12 NOTE — PROGRESS NOTES
Assessment & Plan      Assessment & Plan  Chest pain, unspecified type  Patient has nuclear medicine stress scheduled for Monday.  Advised okay to check on cancellations  Otherwise she will get this done on Monday and we will let her know the results ASAP.  She has a lot of anxiety regarding this.  Also her daughter is soon due with a baby which makes the timing more challenging.  She does have reproducible chest wall tenderness that mimics the chest pain she has been having.  This is reassuring that the pain may be more musculoskeletal but reviewed that it is certainly important to still get the stress test completed.                  Subjective:     Ly is a 74 y.o. female here today and has the below chronic conditions:    Patient Active Problem List   Diagnosis   • Multiple thyroid nodules   • Alternating constipation and diarrhea   • Anxiety   • Subclinical hyperthyroidism   • Mixed hyperlipidemia   • Thrombocytopenia (HCC)   • Hemorrhoids   • Persistent insomnia   • Psoriasis   • Counseling regarding advanced directives     Current Outpatient Medications   Medication Sig Dispense Refill   • aspirin 81 mg chewable tablet Chew 81 mg daily     • cetirizine (ZyrTEC) 10 mg tablet Take 10 mg by mouth daily     • cholecalciferol (VITAMIN D3) 1,000 units tablet Take 1,000 Units by mouth daily     • famotidine (PEPCID) 20 mg tablet Take 1 tablet (20 mg total) by mouth 2 (two) times a day as needed for heartburn or indigestion 60 tablet 1   • hydrocortisone 2.5 % cream Apply topically 2 (two) times a day as needed (hemmorhoid) 28 g 1   • ibuprofen (MOTRIN) 200 mg tablet Take 200 mg by mouth every 6 (six) hours as needed for mild pain     • LORazepam (ATIVAN) 0.5 mg tablet TAKE 1 TABLET BY MOUTH AT  BEDTIME 100 tablet 1   • rosuvastatin (CRESTOR) 10 MG tablet TAKE 1 TABLET BY MOUTH DAILY 100 tablet 1     No current facility-administered medications for this visit.          Chief Complaint   Patient presents with  "  • Anxiety     HPI:  - CC above per clinical staff and reviewed.  Patient is here in regards to her chest pain.  She was found to have some changes on her EKG that necessitate a stress test with imaging rather than purely an EKG stress test.  Cardiology had reached out and suggested a stress echo or nuclear stress test.  I have ordered the nuclear stress test for patient she wanted to come in just to clarify what this actually means and what the testing would entail.  She is anxious about getting the testing done.  That she is not having any increase in the frequency of the chest pain that she is having.  No changes from her previous visit.  She notes that her daughter is due to have a baby very soon.  When she gets the stress test on she will go up to visit her daughter.  She notes that even if her daughter has the baby over the weekend she prefers to stay stay home and get the stress test done so that she does not have to worry about this.  She does note that she has chronic anxiety but this in particular is made things little bit more challenging for her.  History of Present Illness      The following portions of the patient's history were reviewed and updated as appropriate: allergies, current medications, past family history, past medical history, past social history, past surgical history and problem list.    ROS:  Review of Systems   As noted above.    Objective:      /70 (BP Location: Left arm, Patient Position: Sitting, Cuff Size: Standard)   Pulse 74   Temp (!) 97 °F (36.1 °C) (Tympanic)   Resp 16   Ht 5' 3\" (1.6 m)   Wt 64.4 kg (142 lb)   SpO2 99%   BMI 25.15 kg/m²   BP Readings from Last 3 Encounters:   01/08/25 124/70   01/07/25 122/68   01/02/25 136/62     Wt Readings from Last 3 Encounters:   01/08/25 64.4 kg (142 lb)   01/07/25 65.8 kg (145 lb)   01/02/25 66.1 kg (145 lb 12.8 oz)               Physical Exam:   Physical Exam  Vitals and nursing note reviewed.   Constitutional:       " General: She is not in acute distress.     Appearance: Normal appearance. She is not ill-appearing.   HENT:      Mouth/Throat:      Mouth: Mucous membranes are moist.   Neck:      Vascular: No carotid bruit.   Cardiovascular:      Rate and Rhythm: Normal rate and regular rhythm.      Heart sounds: No murmur heard.     Comments: Chest wall tenderness - area where pt feels pain is tender to palpation.  Rest of chest wall nontender.  Pulmonary:      Effort: Pulmonary effort is normal.      Breath sounds: Normal breath sounds. No wheezing or rhonchi.   Musculoskeletal:      Right lower leg: No edema.      Left lower leg: No edema.   Lymphadenopathy:      Cervical: No cervical adenopathy.   Neurological:      Mental Status: She is alert.   Psychiatric:      Comments: anxious         Physical Exam           This office visit note was generated in part with the use of [employer] and/or voice recognition dictation.

## 2025-01-13 ENCOUNTER — TELEPHONE (OUTPATIENT)
Dept: FAMILY MEDICINE CLINIC | Facility: CLINIC | Age: 75
End: 2025-01-13

## 2025-01-13 ENCOUNTER — HOSPITAL ENCOUNTER (OUTPATIENT)
Dept: NON INVASIVE DIAGNOSTICS | Facility: CLINIC | Age: 75
Discharge: HOME | End: 2025-01-13
Payer: COMMERCIAL

## 2025-01-13 ENCOUNTER — PATIENT MESSAGE (OUTPATIENT)
Dept: FAMILY MEDICINE CLINIC | Facility: CLINIC | Age: 75
End: 2025-01-13

## 2025-01-13 VITALS
BODY MASS INDEX: 25.16 KG/M2 | HEIGHT: 63 IN | OXYGEN SATURATION: 100 % | WEIGHT: 142 LBS | SYSTOLIC BLOOD PRESSURE: 142 MMHG | DIASTOLIC BLOOD PRESSURE: 72 MMHG | HEART RATE: 65 BPM

## 2025-01-13 DIAGNOSIS — R94.31 EKG ABNORMALITIES: ICD-10-CM

## 2025-01-13 DIAGNOSIS — R07.9 CHEST PAIN, UNSPECIFIED TYPE: ICD-10-CM

## 2025-01-13 LAB
MAX HR PERCENT: 97 %
MAX HR: 144 BPM
RATE PRESSURE PRODUCT: NORMAL
SL CV REST NUCLEAR ISOTOPE DOSE: 10.4 MCI
SL CV STRESS NUCLEAR ISOTOPE DOSE: 32.9 MCI
SL CV STRESS RECOVERY BP: NORMAL MMHG
SL CV STRESS RECOVERY HR: 98 BPM
SL CV STRESS RECOVERY O2 SAT: 98 %
SL CV STRESS STAGE REACHED: 2
SPECT HRT GATED+EF W RNC IV: 61 %
STRESS ANGINA INDEX: 0
STRESS BASELINE BP: NORMAL MMHG
STRESS BASELINE HR: 65 BPM
STRESS DUKE TREADMILL SCORE: 6
STRESS O2 SAT REST: 100 %
STRESS PEAK HR: 144 BPM
STRESS POST ESTIMATED WORKLOAD: 7 METS
STRESS POST EXERCISE DUR MIN: 6 MIN
STRESS POST EXERCISE DUR SEC: 0 SEC
STRESS POST O2 SAT PEAK: 98 %
STRESS POST PEAK BP: 184 MMHG
STRESS ST DEPRESSION: 0.5 MM
STRESS/REST PERFUSION RATIO: 0.91

## 2025-01-13 PROCEDURE — 93017 CV STRESS TEST TRACING ONLY: CPT

## 2025-01-13 PROCEDURE — 93018 CV STRESS TEST I&R ONLY: CPT | Performed by: INTERNAL MEDICINE

## 2025-01-13 PROCEDURE — 78452 HT MUSCLE IMAGE SPECT MULT: CPT | Performed by: INTERNAL MEDICINE

## 2025-01-13 PROCEDURE — 93016 CV STRESS TEST SUPVJ ONLY: CPT | Performed by: INTERNAL MEDICINE

## 2025-01-13 PROCEDURE — 78452 HT MUSCLE IMAGE SPECT MULT: CPT

## 2025-01-13 PROCEDURE — A9502 TC99M TETROFOSMIN: HCPCS

## 2025-01-14 ENCOUNTER — RESULTS FOLLOW-UP (OUTPATIENT)
Dept: FAMILY MEDICINE CLINIC | Facility: CLINIC | Age: 75
End: 2025-01-14

## 2025-01-14 LAB
CHEST PAIN STATEMENT: NORMAL
MAX DIASTOLIC BP: 50 MMHG
MAX PREDICTED HEART RATE: 147 BPM
PROTOCOL NAME: NORMAL
REASON FOR TERMINATION: NORMAL
STRESS POST EXERCISE DUR MIN: 6 MIN
STRESS POST EXERCISE DUR SEC: 0 SEC
STRESS POST PEAK HR: 144 BPM
STRESS POST PEAK SYSTOLIC BP: 184 MMHG
TARGET HR FORMULA: NORMAL
TEST INDICATION: NORMAL

## 2025-01-14 NOTE — PATIENT COMMUNICATION
Patient called office this morning anxious in regards to her results.  Patient was released her results in mychart.  I did explain to her that patient receives them first. Reassured her that once the provider is able to review them, that she will provide results and feedback to her once that is done. Patient expressed understanding and gratitude for the explanation.    Please respond to patient in mychart with results.

## 2025-01-24 DIAGNOSIS — K21.9 GASTROESOPHAGEAL REFLUX DISEASE, UNSPECIFIED WHETHER ESOPHAGITIS PRESENT: ICD-10-CM

## 2025-01-24 RX ORDER — FAMOTIDINE 20 MG/1
20 TABLET, FILM COATED ORAL 2 TIMES DAILY PRN
Qty: 180 TABLET | Refills: 1 | Status: SHIPPED | OUTPATIENT
Start: 2025-01-24 | End: 2026-01-19

## 2025-03-11 ENCOUNTER — TELEPHONE (OUTPATIENT)
Dept: FAMILY MEDICINE CLINIC | Facility: CLINIC | Age: 75
End: 2025-03-11

## 2025-03-11 DIAGNOSIS — L40.9 PSORIASIS: Primary | ICD-10-CM

## 2025-03-11 NOTE — TELEPHONE ENCOUNTER
Patient called the RX Refill Line. Message is being forwarded to the office.     Patient is requesting a refill for the mometasone 0.1% cream. States she is having a psoriasis flare up.     [facility]

## 2025-03-13 NOTE — TELEPHONE ENCOUNTER
Patient called back to check status of request, advised still pending.  Patient is asking if this could please be sent to the pharmacy as soon as possible.

## 2025-03-14 RX ORDER — MOMETASONE FUROATE 1 MG/G
CREAM TOPICAL DAILY PRN
Qty: 45 G | Refills: 1 | Status: SHIPPED | OUTPATIENT
Start: 2025-03-14

## 2025-03-24 DIAGNOSIS — K64.0 GRADE I HEMORRHOIDS: ICD-10-CM

## 2025-03-24 NOTE — TELEPHONE ENCOUNTER
Reason for call:   [x] Refill   [] Prior Auth  [] Other:     Office:   [x] PCP/Provider - Dahiana Salvador DO / Carlos UKMAR  [] Specialty/Provider -     Medication: hydrocortisone 2.5 % cream     Dose/Frequency:  Apply topically 2 (two) times a day as needed (hemmorhoid),     Quantity: 28 gr    Pharmacy: [id number] - [place], [place] - [address]   Does the patient have enough for 3 days?   [x] Yes   [] No - Send as HP to POD    Mail Away Pharmacy   Does the patient have enough for 10 days?   [] Yes   [] No - Send as HP to POD

## 2025-03-25 ENCOUNTER — VBI (OUTPATIENT)
Dept: ADMINISTRATIVE | Facility: OTHER | Age: 75
End: 2025-03-25

## 2025-03-25 RX ORDER — HYDROCORTISONE 25 MG/G
CREAM TOPICAL 2 TIMES DAILY PRN
Qty: 28 G | Refills: 1 | Status: SHIPPED | OUTPATIENT
Start: 2025-03-25

## 2025-03-25 NOTE — TELEPHONE ENCOUNTER
03/25/25 7:43 AM     Chart reviewed for Mammogram ; nothing is submitted to the patient's insurance at this time.     Grace Malik MA   [facility]

## 2025-03-29 DIAGNOSIS — E78.2 MIXED HYPERLIPIDEMIA: ICD-10-CM

## 2025-03-30 RX ORDER — ROSUVASTATIN CALCIUM 10 MG/1
10 TABLET, COATED ORAL DAILY
Qty: 90 TABLET | Refills: 1 | Status: SHIPPED | OUTPATIENT
Start: 2025-03-30

## 2025-05-12 ENCOUNTER — OFFICE VISIT (OUTPATIENT)
Dept: FAMILY MEDICINE CLINIC | Facility: CLINIC | Age: 75
End: 2025-05-12
Payer: COMMERCIAL

## 2025-05-12 ENCOUNTER — TELEPHONE (OUTPATIENT)
Dept: FAMILY MEDICINE CLINIC | Facility: CLINIC | Age: 75
End: 2025-05-12

## 2025-05-12 VITALS
HEIGHT: 63 IN | WEIGHT: 144.4 LBS | HEART RATE: 78 BPM | BODY MASS INDEX: 25.59 KG/M2 | RESPIRATION RATE: 14 BRPM | SYSTOLIC BLOOD PRESSURE: 90 MMHG | TEMPERATURE: 98.3 F | DIASTOLIC BLOOD PRESSURE: 60 MMHG | OXYGEN SATURATION: 98 %

## 2025-05-12 DIAGNOSIS — S30.861A TICK BITE OF ABDOMINAL WALL, INITIAL ENCOUNTER: Primary | ICD-10-CM

## 2025-05-12 DIAGNOSIS — Z12.31 ENCOUNTER FOR SCREENING MAMMOGRAM FOR BREAST CANCER: ICD-10-CM
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
## 2025-05-12 DIAGNOSIS — L30.9 DERMATITIS: ICD-10-CM

## 2025-05-12 PROCEDURE — 99214 OFFICE O/P EST MOD 30 MIN: CPT | Performed by: FAMILY MEDICINE

## 2025-05-12 NOTE — PROGRESS NOTES
Assessment/Plan:  Assessment & Plan  Tick bite of abdominal wall, initial encounter  Check Lyme test 6 weeks after bite PRN.  Monitor for signs and symptoms of Lyme disease and call for earlier testing PRN.  Handout given.      Orders:    Lyme Total AB W Reflex to IGM/IGG; Future    Dermatitis  See above.           Encounter for screening mammogram for breast cancer    Orders:    Mammo screening bilateral w 3d and cad; Future       Return if symptoms worsen or fail to improve.      Future Appointments   Date Time Provider Department Center   6/2/2025  9:20 AM Dahiana Salvador DO [facility]        Subjective:     Ly is a 75 y.o. female who presents today for a follow-up on her acute medical conditions.        HPI:  Chief Complaint   Patient presents with    Tick Bite     Pt has a tick bite on her abdomen that she noticed on Friday. Slight redness around the bite, no other sx. Denies fevers. Pt removed the tick and would like to be checked.      -- Above per clinical staff and reviewed. --    HPI      Today:      Tick bite - She was unsure when she was bit or exposed.  Noticed bite on abdomen Friday, 5/9/25 and removed it with a wet paper towel.  She notices redness at the site.  She has been using hydrogen peroxide.  Was attached, but unsure if it was embedded and engorged.  H/O Lyme disease.  She is unsure if she was biten by a deer tick.          The following portions of the patient's history were reviewed and updated as appropriate: allergies, current medications, past family history, past medical history, past social history, past surgical history and problem list.      Review of Systems   Constitutional:  Positive for chills (Chronic) and fatigue (Chronic). Negative for appetite change, diaphoresis and fever.   Respiratory:  Negative for chest tightness and shortness of breath.    Cardiovascular:  Negative for chest pain.   Gastrointestinal:  Negative for abdominal pain, blood in stool, diarrhea,  "nausea and vomiting.   Genitourinary:  Negative for dysuria.   Musculoskeletal:  Negative for arthralgias and myalgias.   Neurological:  Negative for headaches.        Current Outpatient Medications   Medication Sig Dispense Refill    aspirin 81 mg chewable tablet Chew 81 mg daily      cetirizine (ZyrTEC) 10 mg tablet Take 10 mg by mouth daily      cholecalciferol (VITAMIN D3) 1,000 units tablet Take 1,000 Units by mouth daily      famotidine (PEPCID) 20 mg tablet TAKE 1 TABLET (20 MG TOTAL) BY MOUTH 2 (TWO) TIMES A DAY AS NEEDED FOR HEARTBURN OR INDIGESTION 180 tablet 1    hydrocortisone 2.5 % cream Apply topically 2 (two) times a day as needed (hemmorhoid) 28 g 1    ibuprofen (MOTRIN) 200 mg tablet Take 200 mg by mouth every 6 (six) hours as needed for mild pain      LORazepam (ATIVAN) 0.5 mg tablet TAKE 1 TABLET BY MOUTH AT  BEDTIME 100 tablet 1    mometasone (ELOCON) 0.1 % cream Apply topically daily as needed (psoriasis) 45 g 1    rosuvastatin (CRESTOR) 10 MG tablet TAKE 1 TABLET BY MOUTH DAILY 90 tablet 1     No current facility-administered medications for this visit.       Objective:  BP 90/60   Pulse 78   Temp 98.3 °F (36.8 °C) (Temporal)   Resp 14   Ht 5' 3\" (1.6 m)   Wt 65.5 kg (144 lb 6.4 oz)   SpO2 98%   BMI 25.58 kg/m²    Wt Readings from Last 3 Encounters:   05/12/25 65.5 kg (144 lb 6.4 oz)   01/13/25 64.4 kg (142 lb)   01/08/25 64.4 kg (142 lb)      BP Readings from Last 3 Encounters:   05/12/25 90/60   01/13/25 142/72   01/08/25 124/70          Physical Exam  Vitals and nursing note reviewed.   Constitutional:       General: She is not in acute distress.     Appearance: Normal appearance. She is well-developed. She is not ill-appearing or toxic-appearing.   HENT:      Head: Normocephalic and atraumatic.   Eyes:      Conjunctiva/sclera: Conjunctivae normal.   Neck:      Thyroid: No thyromegaly.   Cardiovascular:      Rate and Rhythm: Normal rate and regular rhythm.      Pulses: Normal pulses. " "     Heart sounds: Normal heart sounds.   Pulmonary:      Effort: Pulmonary effort is normal.      Breath sounds: Normal breath sounds.   Abdominal:      General: Abdomen is flat.   Musculoskeletal:         General: No swelling or tenderness.      Cervical back: Neck supple.      Right lower leg: No edema.      Left lower leg: No edema.   Lymphadenopathy:      Cervical: No cervical adenopathy.   Skin:     Findings: Erythema (LUQ at tick bite site.  No EM lesion.) present.   Neurological:      General: No focal deficit present.      Mental Status: She is alert and oriented to person, place, and time.   Psychiatric:         Mood and Affect: Mood normal.         Lab Results:      Lab Results   Component Value Date    WBC 5.09 10/24/2024    HGB 13.1 10/24/2024    HCT 41.4 10/24/2024     10/24/2024    TRIG 79 10/24/2024    HDL 58 10/24/2024    ALT 15 10/24/2024    AST 17 10/24/2024    K 3.9 10/24/2024     10/24/2024    CREATININE 0.68 10/24/2024    BUN 18 10/24/2024    CO2 28 10/24/2024    TSH 0.23 (L) 04/23/2019    GLUF 88 10/24/2024     No results found for: \"URICACID\"  Invalid input(s): \"BASENAME\" Vitamin D    No results found.     POCT Labs                       "

## 2025-05-12 NOTE — PATIENT INSTRUCTIONS
"Patient Education     Lyme disease   The Basics   Written by the doctors and editors at [facility]   What is Lyme disease? -- Lyme disease is an illness that can make you feel like you have the flu. It can also cause a rash or fever, as well as nerve, joint, or heart problems.  People can get Lyme disease after being bitten by a tiny insect called a tick. When a certain type of tick bites you, it can pass the germ that causes Lyme disease from its body to yours. But a tick can infect you only if it stays attached for at least a day and a half.  The ticks that carry Lyme disease feed on deer and mice. They are only about the size of a poppy seed when they are young, which is when they most often spread Lyme disease. They grow to about the size of a sesame seed as adults (figure 1). Ticks are found in tall grass and on shrubs, and can attach to animals and people walking by. Ticks cannot fly or jump.  What are the symptoms of Lyme disease? -- Symptoms can start days or weeks after a tick bite. They include:   A rash where you were bitten - The rash often appears within a month of getting bitten. It is red, but its center can be the color of your skin. It might get bigger over a few days. To some, it looks like a \"bull's eye\" (picture 1).   Fever   Body aches and pains   Heart problems such as a slowed heart rate   Headache and stiff neck   Feelings of pain, weakness, or numbness  If a person is not treated, further symptoms can occur months to years after a tick bite. This is sometimes called \"late\" Lyme disease. Some people develop late Lyme disease without having any earlier symptoms. The most common symptom of late Lyme disease is pain and swelling of the joints, usually 1 or both knees. Some people can have other symptoms, such as numbness, tingling, or pain in the legs. They can also have skin problems, such as skin swelling or thinning, but this happens mostly in [place].  Should I see a doctor or nurse? -- Yes. If " you have symptoms of Lyme disease, see a doctor or nurse. Some people don't know that they were bitten by a tick. Or they might not remember having a rash or other early symptoms.  Is there a test for Lyme disease? -- Yes. Blood tests can show if you are infected with the germ that causes Lyme disease. But it takes time for the blood tests to turn positive. This means the tests won't work if you get them right after being bitten or when you have the early rash that goes with Lyme disease. Because of this, if you have a recent tick bite or the rash, you do not need a blood test for Lyme disease. If you have been ill from Lyme disease for more than a month, the tests work well.  If your doctor or nurse suspects you have Lyme disease, they will do an exam and ask you questions. The doctor or nurse will use this information (and your blood test result, if needed) to decide your treatment.  How is Lyme disease treated? -- Lyme disease is usually treated with antibiotics. There are a few different types. Treatment with antibiotics should help your symptoms go away. Sometimes, symptoms improve quickly. Other times, it can take weeks or months for symptoms to go away.  What if I am pregnant? -- If you are pregnant, talk to your doctor. Some medicines for Lyme disease are safe to take if you are pregnant, but others might not be.  Can Lyme disease be prevented? -- The best way to prevent Lyme disease is to avoid getting bitten by a tick. But if you were already bitten, your doctor might give you an antibiotic. In some situations, this can reduce your chances of getting Lyme disease.  To try to avoid getting bitten by a tick, you can:   Wear shoes, long-sleeved shirts, and long pants when you go outside. Keep ticks away from your skin by tucking your pants into your socks.   Wear light colors so you can spot any ticks that get on your clothes   Wear bug repellent that protects against ticks, such as a spray or cream  "containing DEET. But you should talk to your doctor or nurse about using DEET on children. For example, DEET should not be used on babies younger than 2 months, and experts suggest not using products with more than 30 percent DEET on other young children.  On your clothes and gear, you can use bug repellents that have a chemical called \"permethrin.\"   Shower within 2 hours of being outdoors if you think you have been in an area where there are ticks   Put dry clothes briefly (for about 4 minutes) in a dryer after being outdoors   Check your clothes and body for ticks after being outdoors. Be sure to check your scalp, waist, armpits, groin, and backs of your knees. If you have children, check them, too.   If you live in a place that has deer or mice nearby, take steps to keep those animals away. Deer and mice carry ticks.  If you find a tick on your body or on your child, use tweezers to grab it. Then pull it out slowly and gently. After that, wash the area with soap and water.  You do not need to keep the tick. But knowing what it looked like can help your doctor decide about your treatment. See if you can tell:   Its color and size   If it was attached to your skin or just resting on your skin   If it was big, round, and full of blood  You should see your doctor or nurse if you have a tick and you cannot get it off.  You should also call your doctor or nurse if you think you have had a tick attached for at least 36 hours (a day and a half). Then they can decide if you need to take a dose of an antibiotic to help prevent Lyme disease. Doctors only recommend antibiotics to prevent Lyme disease in some situations. It depends on your age, where you live, what kind of tick bit you, and how long it was attached.  If you or your child was bitten by a deer tick, you should watch the area around the bite for a month to see if a rash appears.  All topics are updated as new evidence becomes available and our peer review " "process is complete.  This topic retrieved from [employer] on: Feb 26, 2024.  Topic 73209 Version 12.0  Release: 32.2.4 - C32.56  © 2024 Up[employer]Date, Inc. and/or its affiliates. All rights reserved.  figure 1: Different types of ticks     This drawing shows 3 different types of ticks. The top row are the type of ticks that can carry Lyme disease (\"blacklegged ticks\" or \"deer ticks\"). The middle and bottom rows are different types of ticks that do not carry Lyme disease.  Graphic 151165 Version 3.0  picture 1: Lyme disease rash     Lyme disease can cause a circular rash that might look like a \"bull's eye.\"  Graphic 72066 Version 4.0  Consumer Information Use and Disclaimer   Disclaimer: This generalized information is a limited summary of diagnosis, treatment, and/or medication information. It is not meant to be comprehensive and should be used as a tool to help the user understand and/or assess potential diagnostic and treatment options. It does NOT include all information about conditions, treatments, medications, side effects, or risks that may apply to a specific patient. It is not intended to be medical advice or a substitute for the medical advice, diagnosis, or treatment of a health care provider based on the health care provider's examination and assessment of a patient's specific and unique circumstances. Patients must speak with a health care provider for complete information about their health, medical questions, and treatment options, including any risks or benefits regarding use of medications. This information does not endorse any treatments or medications as safe, effective, or approved for treating a specific patient. UpToDate, Inc. and its affiliates disclaim any warranty or liability relating to this information or the use thereof.The use of this information is governed by the Terms of Use, available at https://www.wolterskluwer.com/en/know/clinical-effectiveness-terms. 2024© UpToDate, Inc. and its " affiliates and/or licensors. All rights reserved.  Copyright   © 2024 [employer] and/or its affiliates. All rights reserved.

## 2025-05-12 NOTE — TELEPHONE ENCOUNTER
Her last blood work was good - she does not need any until the fall for her medicare wellness visit. This visit will be a mood check - she can make it virtual if she prefers.    ambulatory

## 2025-05-12 NOTE — TELEPHONE ENCOUNTER
Pt in for an acute visit with . While here, pt is asking about blood work for her upcoming appt with . No open orders were seen. Please order labs and send to clerical to print and mail orders to pt.

## 2025-05-28 DIAGNOSIS — F41.9 ANXIETY: ICD-10-CM

## 2025-05-29 NOTE — TELEPHONE ENCOUNTER
Patient called to request a refill for their ativan advised a refill was requested on 5/28/25 and is pending approval. Patient verbalized understanding and is in agreement.     Does the patient have enough for 3 days?   [x] Yes   [] No - Send as HP to POD

## 2025-05-30 RX ORDER — LORAZEPAM 0.5 MG/1
0.5 TABLET ORAL
Qty: 100 TABLET | Refills: 0 | Status: SHIPPED | OUTPATIENT
Start: 2025-05-30

## 2025-06-02 ENCOUNTER — OFFICE VISIT (OUTPATIENT)
Dept: FAMILY MEDICINE CLINIC | Facility: CLINIC | Age: 75
End: 2025-06-02
Payer: COMMERCIAL

## 2025-06-02 VITALS
WEIGHT: 143.8 LBS | HEART RATE: 80 BPM | BODY MASS INDEX: 25.48 KG/M2 | TEMPERATURE: 98.3 F | HEIGHT: 63 IN | SYSTOLIC BLOOD PRESSURE: 118 MMHG | DIASTOLIC BLOOD PRESSURE: 64 MMHG | OXYGEN SATURATION: 100 %

## 2025-06-02 DIAGNOSIS — Z78.0 POST-MENOPAUSE: ICD-10-CM

## 2025-06-02 DIAGNOSIS — E55.9 VITAMIN D DEFICIENCY: ICD-10-CM

## 2025-06-02 DIAGNOSIS — K64.0 GRADE I HEMORRHOIDS: ICD-10-CM

## 2025-06-02 DIAGNOSIS — D69.6 THROMBOCYTOPENIA (HCC): ICD-10-CM

## 2025-06-02 DIAGNOSIS — E05.90 SUBCLINICAL HYPERTHYROIDISM: ICD-10-CM

## 2025-06-02 DIAGNOSIS — L82.1 SEBORRHEIC KERATOSES: ICD-10-CM

## 2025-06-02 DIAGNOSIS — E78.2 MIXED HYPERLIPIDEMIA: ICD-10-CM

## 2025-06-02 DIAGNOSIS — L98.9 SKIN LESION OF LEFT ARM: ICD-10-CM

## 2025-06-02 DIAGNOSIS — F41.9 ANXIETY: Primary | ICD-10-CM

## 2025-06-02 DIAGNOSIS — M54.2 NECK PAIN: ICD-10-CM

## 2025-06-02 PROCEDURE — 99214 OFFICE O/P EST MOD 30 MIN: CPT | Performed by: FAMILY MEDICINE

## 2025-06-02 RX ORDER — HYDROCORTISONE 25 MG/G
CREAM TOPICAL 2 TIMES DAILY PRN
Qty: 28 G | Refills: 1 | Status: SHIPPED | OUTPATIENT
Start: 2025-06-02

## 2025-06-02 NOTE — PROGRESS NOTES
Follow up visit   Name: Ly Richardson      : 1950      MRN: [id number]  Encounter Provider: Dahiana Salvador DO  Encounter Date: 2025   Encounter department: [facility]    :  Assessment & Plan  Skin lesion of left arm  5 mm new lesion in last 2 days   C/w petechiae/purpura   Should resolve in next 2 weeks  If not call, send picture via MyChart        Seborrheic keratoses  4 mm oval shaped left forehead   Reassurance given        Subclinical hyperthyroidism  Update labs prior to next appointment   Orders:    Comprehensive metabolic panel; Future    Lipid panel; Future    Vitamin D 25 hydroxy; Future    TSH, 3rd generation; Future    CBC and differential; Future    T4, free; Future    Mixed hyperlipidemia  Update labs prior to next appointment   Orders:    Comprehensive metabolic panel; Future    Lipid panel; Future    Vitamin D 25 hydroxy; Future    TSH, 3rd generation; Future    CBC and differential; Future    T4, free; Future    Thrombocytopenia (HCC)  Update labs prior to next appointment   Orders:    Comprehensive metabolic panel; Future    Lipid panel; Future    Vitamin D 25 hydroxy; Future    TSH, 3rd generation; Future    CBC and differential; Future    T4, free; Future    Vitamin D deficiency  Update labs prior to next appointment   Orders:    Comprehensive metabolic panel; Future    Lipid panel; Future    Vitamin D 25 hydroxy; Future    TSH, 3rd generation; Future    CBC and differential; Future    T4, free; Future    Neck pain  Refer to chiropractor   Orders:    Ambulatory referral to Chiropractic; Future    Post-menopause  Pt needs to r/s her appointment   Orders:    DXA bone density spine hip and pelvis; Future    Grade I hemorrhoids  Refill hc cream   Orders:    hydrocortisone 2.5 % cream; Apply topically 2 (two) times a day as needed (hemmorhoid)    Anxiety  Well controlled        No signs/symptoms of lyme disease   Tick described as dog tick   No need for  "blood work   Assessment & Plan        Has to cancel dexa     Return in about 6 months (around 11/26/2025) for Medicare Wellness Visit.        Ly is a 75 y.o. female who presents today for follow up on chronic conditions.     Chief Complaint   Patient presents with    Follow-up     History of Present Illness     Concerns today:  Rgith sided neck pain for 2 months   Woke up one morning   Not stiffness   2 -3/10 at this point     Using momentasone pr and not helping       Review of Systems  ROS:  all others negative - no chest pain, SOB, normal urine and bowels. no GERD. sleeping well. mood good.     PHQ-2/9 Depression Screening                Objective   /64 (Patient Position: Sitting, Cuff Size: Standard)   Pulse 80   Temp 98.3 °F (36.8 °C) (Temporal)   Ht 5' 3\" (1.6 m)   Wt 65.2 kg (143 lb 12.8 oz)   SpO2 100%   BMI 25.47 kg/m²     /64 (Patient Position: Sitting, Cuff Size: Standard)   Pulse 80   Temp 98.3 °F (36.8 °C) (Temporal)   Ht 5' 3\" (1.6 m)   Wt 65.2 kg (143 lb 12.8 oz)   SpO2 100%   BMI 25.47 kg/m²     BP Readings from Last 3 Encounters:   06/02/25 118/64   05/12/25 90/60   01/13/25 142/72     Wt Readings from Last 3 Encounters:   06/02/25 65.2 kg (143 lb 12.8 oz)   05/12/25 65.5 kg (144 lb 6.4 oz)   01/13/25 64.4 kg (142 lb)       Physical Exam  Constitutional: she appears well-developed and well-nourished.   HENT: Head: Normocephalic.   Right Ear: External ear normal. Tympanic membrane normal.   Left Ear: External ear normal. Tympanic membrane normal.   Nose: Nose normal. No mucosal edema, No rhinorrhea.   Right sinus exhibits no maxillary sinus tenderness.   Left sinus exhibits no maxillary sinus tenderness.   Mouth/Throat: Oropharynx is clear and moist.   Eyes: Normal conjunctiva.  No erythema. No discharge.  Neck: No pain on exam. Neck supple.   Cardiovascular: Normal rate, regular rhythm and normal heart sounds.    Pulmonary/Chest: Effort normal and breath sounds normal. No " wheezes. No rales. No rhonchi.   Abdominal: Soft. Bowel sounds are normal. There is no tenderness.   Musculoskeletal: she exhibits no edema.   Lymphadenopathy: she has no cervical adenopathy.   Neurological: she  is alert and oriented to person, place, and time.   Skin: Skin is warm and dry. No rashes. Left arm 5 mm.            SK left forehead 4 mm   Area of previous tick bite well healed   Psychiatric: she  has a normal mood and affect. her behavior is normal. Thought content normal.   Vitals reviewed.      Current Medications:  Current Medications[1]         [1]   Current Outpatient Medications   Medication Sig Dispense Refill    aspirin 81 mg chewable tablet Chew 81 mg in the morning.      cetirizine (ZyrTEC) 10 mg tablet Take 10 mg by mouth in the morning.      cholecalciferol (VITAMIN D3) 1,000 units tablet Take 1,000 Units by mouth in the morning.      hydrocortisone 2.5 % cream Apply topically 2 (two) times a day as needed (hemmorhoid) 28 g 1    ibuprofen (MOTRIN) 200 mg tablet Take 200 mg by mouth every 6 (six) hours as needed for mild pain      LORazepam (ATIVAN) 0.5 mg tablet TAKE 1 TABLET BY MOUTH AT  BEDTIME 100 tablet 0    mometasone (ELOCON) 0.1 % cream Apply topically daily as needed (psoriasis) 45 g 1    rosuvastatin (CRESTOR) 10 MG tablet TAKE 1 TABLET BY MOUTH DAILY 90 tablet 1     No current facility-administered medications for this visit.

## 2025-06-02 NOTE — PATIENT INSTRUCTIONS
To schedule testing or change an appointment:  You can call [facility] central scheduling to schedule your test at [phone].  They are available M-F 7AM-7PM and Saturday 8AM-noon.

## 2025-06-02 NOTE — ASSESSMENT & PLAN NOTE
Update labs prior to next appointment   Orders:    Comprehensive metabolic panel; Future    Lipid panel; Future    Vitamin D 25 hydroxy; Future    TSH, 3rd generation; Future    CBC and differential; Future    T4, free; Future

## 2025-06-02 NOTE — ASSESSMENT & PLAN NOTE
Refill hc cream   Orders:    hydrocortisone 2.5 % cream; Apply topically 2 (two) times a day as needed (hemmorhoid)

## 2025-06-05 ENCOUNTER — TELEPHONE (OUTPATIENT)
Age: 75
End: 2025-06-05

## 2025-06-05 NOTE — TELEPHONE ENCOUNTER
Patient calls requesting a dermatology referral be placed for left arm lesion. Patient states lesion is not going away and she doesn't think it will go away.     Please review . Call patient when referral is placed or if there is a problem entering referral.

## 2025-06-06 NOTE — TELEPHONE ENCOUNTER
Please ask her to send a picture via [facility].  I told her it would likely take a couple of weeks to go away and it is only been a couple of days.

## 2025-06-07 DIAGNOSIS — E78.2 MIXED HYPERLIPIDEMIA: ICD-10-CM

## 2025-06-08 RX ORDER — ROSUVASTATIN CALCIUM 10 MG/1
10 TABLET, COATED ORAL DAILY
Qty: 100 TABLET | Refills: 1 | Status: SHIPPED | OUTPATIENT
Start: 2025-06-08

## 2025-06-09 ENCOUNTER — PATIENT MESSAGE (OUTPATIENT)
Dept: FAMILY MEDICINE CLINIC | Facility: CLINIC | Age: 75
End: 2025-06-09

## 2025-06-09 ENCOUNTER — TELEPHONE (OUTPATIENT)
Age: 75
End: 2025-06-09

## 2025-06-09 NOTE — TELEPHONE ENCOUNTER
Patient called, stated that she would like to thank Dr. Mark for care. Patient stated that her lesion shrink and she feels better.

## 2025-06-30 ENCOUNTER — VBI (OUTPATIENT)
Dept: ADMINISTRATIVE | Facility: OTHER | Age: 75
End: 2025-06-30

## 2025-06-30 NOTE — TELEPHONE ENCOUNTER
06/30/25 8:40 AM     Chart reviewed for Mammogram ; nothing is submitted to the patient's insurance at this time.     Grace Malik MA   [facility]

## 2025-07-08 ENCOUNTER — TELEPHONE (OUTPATIENT)
Age: 75
End: 2025-07-08

## 2025-08-13 ENCOUNTER — PATIENT MESSAGE (OUTPATIENT)
Dept: FAMILY MEDICINE CLINIC | Facility: CLINIC | Age: 75
End: 2025-08-13

## 2025-08-15 ENCOUNTER — OFFICE VISIT (OUTPATIENT)
Dept: FAMILY MEDICINE CLINIC | Facility: CLINIC | Age: 75
End: 2025-08-15
Payer: COMMERCIAL

## 2025-08-15 ENCOUNTER — TELEPHONE (OUTPATIENT)
Age: 75
End: 2025-08-15

## 2025-08-16 ENCOUNTER — APPOINTMENT (OUTPATIENT)
Dept: LAB | Facility: CLINIC | Age: 75
End: 2025-08-16
Attending: FAMILY MEDICINE
Payer: COMMERCIAL

## 2025-08-16 DIAGNOSIS — D69.6 THROMBOCYTOPENIA (HCC): ICD-10-CM

## 2025-08-16 DIAGNOSIS — E55.9 VITAMIN D DEFICIENCY: ICD-10-CM

## 2025-08-16 DIAGNOSIS — E05.90 SUBCLINICAL HYPERTHYROIDISM: ICD-10-CM

## 2025-08-16 DIAGNOSIS — E78.2 MIXED HYPERLIPIDEMIA: ICD-10-CM

## 2025-08-16 LAB
25(OH)D3 SERPL-MCNC: 55.9 NG/ML (ref 30–100)
ALBUMIN SERPL BCG-MCNC: 4.2 G/DL (ref 3.5–5)
ALP SERPL-CCNC: 40 U/L (ref 34–104)
ALT SERPL W P-5'-P-CCNC: 15 U/L (ref 7–52)
ANION GAP SERPL CALCULATED.3IONS-SCNC: 5 MMOL/L (ref 4–13)
AST SERPL W P-5'-P-CCNC: 18 U/L (ref 13–39)
BASOPHILS # BLD AUTO: 0.03 THOUSANDS/ÂΜL (ref 0–0.1)
BASOPHILS NFR BLD AUTO: 1 % (ref 0–1)
BILIRUB SERPL-MCNC: 0.83 MG/DL (ref 0.2–1)
BUN SERPL-MCNC: 11 MG/DL (ref 5–25)
CALCIUM SERPL-MCNC: 9.2 MG/DL (ref 8.4–10.2)
CHLORIDE SERPL-SCNC: 101 MMOL/L (ref 96–108)
CHOLEST SERPL-MCNC: 146 MG/DL (ref ?–200)
CO2 SERPL-SCNC: 29 MMOL/L (ref 21–32)
CREAT SERPL-MCNC: 0.65 MG/DL (ref 0.6–1.3)
EOSINOPHIL # BLD AUTO: 0.07 THOUSAND/ÂΜL (ref 0–0.61)
EOSINOPHIL NFR BLD AUTO: 1 % (ref 0–6)
ERYTHROCYTE [DISTWIDTH] IN BLOOD BY AUTOMATED COUNT: 13.7 % (ref 11.6–15.1)
GFR SERPL CREATININE-BSD FRML MDRD: 87 ML/MIN/1.73SQ M
GLUCOSE P FAST SERPL-MCNC: 95 MG/DL (ref 65–99)
HCT VFR BLD AUTO: 42.3 % (ref 34.8–46.1)
HDLC SERPL-MCNC: 62 MG/DL
HGB BLD-MCNC: 13.8 G/DL (ref 11.5–15.4)
IMM GRANULOCYTES # BLD AUTO: 0.02 THOUSAND/UL (ref 0–0.2)
IMM GRANULOCYTES NFR BLD AUTO: 0 % (ref 0–2)
LDLC SERPL CALC-MCNC: 66 MG/DL (ref 0–100)
LYMPHOCYTES # BLD AUTO: 1.16 THOUSANDS/ÂΜL (ref 0.6–4.47)
LYMPHOCYTES NFR BLD AUTO: 19 % (ref 14–44)
MCH RBC QN AUTO: 30.8 PG (ref 26.8–34.3)
MCHC RBC AUTO-ENTMCNC: 32.6 G/DL (ref 31.4–37.4)
MCV RBC AUTO: 94 FL (ref 82–98)
MONOCYTES # BLD AUTO: 0.71 THOUSAND/ÂΜL (ref 0.17–1.22)
MONOCYTES NFR BLD AUTO: 12 % (ref 4–12)
NEUTROPHILS # BLD AUTO: 4.06 THOUSANDS/ÂΜL (ref 1.85–7.62)
NEUTS SEG NFR BLD AUTO: 67 % (ref 43–75)
NONHDLC SERPL-MCNC: 84 MG/DL
NRBC BLD AUTO-RTO: 0 /100 WBCS
PLATELET # BLD AUTO: 198 THOUSANDS/UL (ref 149–390)
PMV BLD AUTO: 11.9 FL (ref 8.9–12.7)
POTASSIUM SERPL-SCNC: 4 MMOL/L (ref 3.5–5.3)
PROT SERPL-MCNC: 6.9 G/DL (ref 6.4–8.4)
RBC # BLD AUTO: 4.48 MILLION/UL (ref 3.81–5.12)
SODIUM SERPL-SCNC: 135 MMOL/L (ref 135–147)
T4 FREE SERPL-MCNC: 0.89 NG/DL (ref 0.61–1.12)
TRIGL SERPL-MCNC: 90 MG/DL (ref ?–150)
TSH SERPL DL<=0.05 MIU/L-ACNC: 0.64 UIU/ML (ref 0.45–4.5)
WBC # BLD AUTO: 6.05 THOUSAND/UL (ref 4.31–10.16)

## 2025-08-16 PROCEDURE — 84439 ASSAY OF FREE THYROXINE: CPT

## 2025-08-16 PROCEDURE — 80061 LIPID PANEL: CPT

## 2025-08-16 PROCEDURE — 80053 COMPREHEN METABOLIC PANEL: CPT

## 2025-08-16 PROCEDURE — 36415 COLL VENOUS BLD VENIPUNCTURE: CPT

## 2025-08-16 PROCEDURE — 84443 ASSAY THYROID STIM HORMONE: CPT

## 2025-08-16 PROCEDURE — 82306 VITAMIN D 25 HYDROXY: CPT

## 2025-08-16 PROCEDURE — 85025 COMPLETE CBC W/AUTO DIFF WBC: CPT

## 2025-08-18 ENCOUNTER — APPOINTMENT (OUTPATIENT)
Dept: LAB | Facility: CLINIC | Age: 75
End: 2025-08-18
Payer: COMMERCIAL

## 2025-08-18 ENCOUNTER — EVALUATION (OUTPATIENT)
Dept: PHYSICAL THERAPY | Facility: CLINIC | Age: 75
End: 2025-08-18
Attending: FAMILY MEDICINE
Payer: COMMERCIAL

## 2025-08-18 DIAGNOSIS — M54.2 NECK PAIN ON RIGHT SIDE: Primary | ICD-10-CM
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
## 2025-08-18 DIAGNOSIS — S30.861A TICK BITE OF ABDOMINAL WALL, INITIAL ENCOUNTER: ICD-10-CM

## 2025-08-18 PROCEDURE — 97161 PT EVAL LOW COMPLEX 20 MIN: CPT | Performed by: PHYSICAL THERAPIST

## 2025-08-18 PROCEDURE — 86618 LYME DISEASE ANTIBODY: CPT

## 2025-08-18 PROCEDURE — 36415 COLL VENOUS BLD VENIPUNCTURE: CPT

## 2025-08-18 PROCEDURE — 97110 THERAPEUTIC EXERCISES: CPT | Performed by: PHYSICAL THERAPIST

## 2025-08-19 ENCOUNTER — TELEPHONE (OUTPATIENT)
Age: 75
End: 2025-08-19

## 2025-08-19 LAB — B BURGDOR IGG+IGM SER QL IA: NEGATIVE
